# Patient Record
Sex: MALE | Race: WHITE | NOT HISPANIC OR LATINO | Employment: OTHER | URBAN - METROPOLITAN AREA
[De-identification: names, ages, dates, MRNs, and addresses within clinical notes are randomized per-mention and may not be internally consistent; named-entity substitution may affect disease eponyms.]

---

## 2019-12-12 ENCOUNTER — OFFICE VISIT (OUTPATIENT)
Dept: FAMILY MEDICINE CLINIC | Facility: CLINIC | Age: 69
End: 2019-12-12

## 2019-12-12 VITALS
TEMPERATURE: 98.3 F | SYSTOLIC BLOOD PRESSURE: 142 MMHG | OXYGEN SATURATION: 96 % | BODY MASS INDEX: 23.62 KG/M2 | RESPIRATION RATE: 20 BRPM | HEART RATE: 124 BPM | WEIGHT: 165 LBS | HEIGHT: 70 IN | DIASTOLIC BLOOD PRESSURE: 94 MMHG

## 2019-12-12 DIAGNOSIS — F10.20 ALCOHOLIC (HCC): ICD-10-CM

## 2019-12-12 DIAGNOSIS — G25.0 BENIGN ESSENTIAL TREMOR: ICD-10-CM

## 2019-12-12 DIAGNOSIS — Z12.5 SCREENING FOR PROSTATE CANCER: ICD-10-CM

## 2019-12-12 DIAGNOSIS — I10 ESSENTIAL HYPERTENSION: Primary | ICD-10-CM

## 2019-12-12 LAB
ALBUMIN SERPL-MCNC: 4.6 G/DL (ref 3.5–5.2)
ALBUMIN/GLOB SERPL: 1.2 G/DL
ALP SERPL-CCNC: 86 U/L (ref 39–117)
ALT SERPL W P-5'-P-CCNC: 12 U/L (ref 1–41)
ANION GAP SERPL CALCULATED.3IONS-SCNC: 15.3 MMOL/L (ref 5–15)
AST SERPL-CCNC: 17 U/L (ref 1–40)
BASOPHILS # BLD AUTO: 0.04 10*3/MM3 (ref 0–0.2)
BASOPHILS NFR BLD AUTO: 0.6 % (ref 0–1.5)
BILIRUB SERPL-MCNC: 0.7 MG/DL (ref 0.2–1.2)
BUN BLD-MCNC: 16 MG/DL (ref 8–23)
BUN/CREAT SERPL: 16.2 (ref 7–25)
CALCIUM SPEC-SCNC: 9.6 MG/DL (ref 8.6–10.5)
CHLORIDE SERPL-SCNC: 100 MMOL/L (ref 98–107)
CHOLEST SERPL-MCNC: 156 MG/DL (ref 0–200)
CO2 SERPL-SCNC: 24.7 MMOL/L (ref 22–29)
CREAT BLD-MCNC: 0.99 MG/DL (ref 0.76–1.27)
DEPRECATED RDW RBC AUTO: 43.8 FL (ref 37–54)
EOSINOPHIL # BLD AUTO: 0.01 10*3/MM3 (ref 0–0.4)
EOSINOPHIL NFR BLD AUTO: 0.1 % (ref 0.3–6.2)
ERYTHROCYTE [DISTWIDTH] IN BLOOD BY AUTOMATED COUNT: 13.5 % (ref 12.3–15.4)
FOLATE SERPL-MCNC: 11.2 NG/ML (ref 4.78–24.2)
GFR SERPL CREATININE-BSD FRML MDRD: 75 ML/MIN/1.73
GLOBULIN UR ELPH-MCNC: 3.7 GM/DL
GLUCOSE BLD-MCNC: 103 MG/DL (ref 65–99)
HCT VFR BLD AUTO: 48.3 % (ref 37.5–51)
HDLC SERPL-MCNC: 54 MG/DL (ref 40–60)
HGB BLD-MCNC: 16.2 G/DL (ref 13–17.7)
IMM GRANULOCYTES # BLD AUTO: 0.03 10*3/MM3 (ref 0–0.05)
IMM GRANULOCYTES NFR BLD AUTO: 0.4 % (ref 0–0.5)
LDLC SERPL CALC-MCNC: 87 MG/DL (ref 0–100)
LDLC/HDLC SERPL: 1.61 {RATIO}
LYMPHOCYTES # BLD AUTO: 2.16 10*3/MM3 (ref 0.7–3.1)
LYMPHOCYTES NFR BLD AUTO: 31.6 % (ref 19.6–45.3)
MCH RBC QN AUTO: 29.8 PG (ref 26.6–33)
MCHC RBC AUTO-ENTMCNC: 33.5 G/DL (ref 31.5–35.7)
MCV RBC AUTO: 88.8 FL (ref 79–97)
MONOCYTES # BLD AUTO: 0.67 10*3/MM3 (ref 0.1–0.9)
MONOCYTES NFR BLD AUTO: 9.8 % (ref 5–12)
NEUTROPHILS # BLD AUTO: 3.92 10*3/MM3 (ref 1.7–7)
NEUTROPHILS NFR BLD AUTO: 57.5 % (ref 42.7–76)
NRBC BLD AUTO-RTO: 0 /100 WBC (ref 0–0.2)
PLATELET # BLD AUTO: 196 10*3/MM3 (ref 140–450)
PMV BLD AUTO: 10.7 FL (ref 6–12)
POTASSIUM BLD-SCNC: 4.7 MMOL/L (ref 3.5–5.2)
PROT SERPL-MCNC: 8.3 G/DL (ref 6–8.5)
PSA SERPL-MCNC: 0.5 NG/ML (ref 0–4)
RBC # BLD AUTO: 5.44 10*6/MM3 (ref 4.14–5.8)
SODIUM BLD-SCNC: 140 MMOL/L (ref 136–145)
T4 FREE SERPL-MCNC: 1.12 NG/DL (ref 0.93–1.7)
TRIGL SERPL-MCNC: 74 MG/DL (ref 0–150)
TSH SERPL DL<=0.05 MIU/L-ACNC: 4.43 UIU/ML (ref 0.27–4.2)
VIT B12 BLD-MCNC: 250 PG/ML (ref 211–946)
VLDLC SERPL-MCNC: 14.8 MG/DL (ref 5–40)
WBC NRBC COR # BLD: 6.83 10*3/MM3 (ref 3.4–10.8)

## 2019-12-12 PROCEDURE — 99214 OFFICE O/P EST MOD 30 MIN: CPT | Performed by: FAMILY MEDICINE

## 2019-12-12 PROCEDURE — 84443 ASSAY THYROID STIM HORMONE: CPT | Performed by: FAMILY MEDICINE

## 2019-12-12 PROCEDURE — 80061 LIPID PANEL: CPT | Performed by: FAMILY MEDICINE

## 2019-12-12 PROCEDURE — 82607 VITAMIN B-12: CPT | Performed by: FAMILY MEDICINE

## 2019-12-12 PROCEDURE — 80053 COMPREHEN METABOLIC PANEL: CPT | Performed by: FAMILY MEDICINE

## 2019-12-12 PROCEDURE — 85025 COMPLETE CBC W/AUTO DIFF WBC: CPT | Performed by: FAMILY MEDICINE

## 2019-12-12 PROCEDURE — 84439 ASSAY OF FREE THYROXINE: CPT | Performed by: FAMILY MEDICINE

## 2019-12-12 PROCEDURE — 90472 IMMUNIZATION ADMIN EACH ADD: CPT | Performed by: FAMILY MEDICINE

## 2019-12-12 PROCEDURE — 90471 IMMUNIZATION ADMIN: CPT | Performed by: FAMILY MEDICINE

## 2019-12-12 PROCEDURE — G0103 PSA SCREENING: HCPCS | Performed by: FAMILY MEDICINE

## 2019-12-12 PROCEDURE — 82746 ASSAY OF FOLIC ACID SERUM: CPT | Performed by: FAMILY MEDICINE

## 2019-12-12 PROCEDURE — G0008 ADMIN INFLUENZA VIRUS VAC: HCPCS | Performed by: FAMILY MEDICINE

## 2019-12-12 PROCEDURE — 90715 TDAP VACCINE 7 YRS/> IM: CPT | Performed by: FAMILY MEDICINE

## 2019-12-12 PROCEDURE — 90670 PCV13 VACCINE IM: CPT | Performed by: FAMILY MEDICINE

## 2019-12-12 PROCEDURE — 90653 IIV ADJUVANT VACCINE IM: CPT | Performed by: FAMILY MEDICINE

## 2019-12-12 RX ORDER — NADOLOL 40 MG/1
40 TABLET ORAL DAILY
Qty: 30 TABLET | Refills: 3 | Status: SHIPPED | OUTPATIENT
Start: 2019-12-12 | End: 2020-01-28

## 2019-12-12 NOTE — PROGRESS NOTES
Olu Henriquez is a 69 y.o. male who presents today to establish care.    Chief Complaint   Patient presents with   • Establish Care     needs pcp        69-year-old male here to establish care.  Patient has past medical history of chronic alcoholism, benign essential tremor, and hypertension.  He is also been treated for anxiety and depression in the past but is not been taking Effexor for some time now has been feeling well.  Patient has cut drinking back to 2 beers a day.  He is no longer smoking cigarettes as of approximately 3 months ago.  Patient has no acute complaints today although he has been out of nadolol for several months and has noticed tremor becoming worse without it.  He has been taking nadolol for years and feels it works well for him.  Is also controlled his blood pressure in the past.  He has no acute complaints today.  Patient denies chest pain, shortness of breath, nausea, vomiting, diarrhea, constipation, urinary symptoms, and edema.       Review of Systems   Constitutional: Negative for fever and unexpected weight loss.   HENT: Negative for congestion, ear pain and sore throat.    Eyes: Negative for visual disturbance.   Respiratory: Negative for cough, shortness of breath and wheezing.    Cardiovascular: Negative for chest pain and palpitations.   Gastrointestinal: Negative for abdominal pain, blood in stool, constipation, diarrhea, nausea, vomiting and GERD.   Endocrine: Negative for polydipsia and polyuria.   Genitourinary: Negative for difficulty urinating.   Musculoskeletal: Negative for joint swelling.   Skin: Negative for rash and skin lesions.   Allergic/Immunologic: Negative for environmental allergies.   Neurological: Negative for seizures and syncope.   Hematological: Does not bruise/bleed easily.   Psychiatric/Behavioral: Negative for suicidal ideas.        PHQ-9 Depression Screening  Little interest or pleasure in doing things? 0   Feeling down, depressed, or hopeless? 0    Trouble falling or staying asleep, or sleeping too much?     Feeling tired or having little energy?     Poor appetite or overeating?     Feeling bad about yourself - or that you are a failure or have let yourself or your family down?     Trouble concentrating on things, such as reading the newspaper or watching television?     Moving or speaking so slowly that other people could have noticed? Or the opposite - being so fidgety or restless that you have been moving around a lot more than usual?     Thoughts that you would be better off dead, or of hurting yourself in some way?     PHQ-9 Total Score 0   If you checked off any problems, how difficult have these problems made it for you to do your work, take care of things at home, or get along with other people?         Past Medical History:   Diagnosis Date   • Alcoholic (CMS/HCC)    • Anxiety    • Concussion    • Depression    • Hypertension    • Pneumonia    • Shingles         Past Surgical History:   Procedure Laterality Date   • CHEST SURGERY      Chest clean out aftrer PNA with multiple chest tubes   • TONSILLECTOMY          Family History   Problem Relation Age of Onset   • No Known Problems Sister    • Hyperthyroidism Brother    • No Known Problems Daughter    • Other Mother 87        unknown   • Colon cancer Father    • No Known Problems Sister         Social History     Socioeconomic History   • Marital status:      Spouse name: Not on file   • Number of children: Not on file   • Years of education: Not on file   • Highest education level: Not on file   Tobacco Use   • Smoking status: Former Smoker     Packs/day: 1.00     Years: 58.00     Pack years: 58.00     Types: Cigarettes     Last attempt to quit: 2019     Years since quittin.2   • Smokeless tobacco: Never Used   Substance and Sexual Activity   • Alcohol use: Yes     Alcohol/week: 2.0 standard drinks     Types: 2 Cans of beer per week     Frequency: 4 or more times a  "week     Drinks per session: 1 or 2     Comment: daily, was a heavy drinker for years   • Drug use: Never   • Sexual activity: Not Currently        Current Outpatient Medications on File Prior to Visit   Medication Sig Dispense Refill   • [DISCONTINUED] nadolol (CORGARD) 40 MG tablet TAKE ONE TABLET BY MOUTH ONCE DAILY 30 tablet 0   • [DISCONTINUED] venlafaxine XR (EFFEXOR-XR) 75 MG 24 hr capsule TAKE ONE CAPSULE BY MOUTH ONCE DAILY 30 capsule 0     No current facility-administered medications on file prior to visit.        No Known Allergies     Visit Vitals  /94   Pulse (!) 124   Temp 98.3 °F (36.8 °C)   Resp 20   Ht 177.8 cm (70\")   Wt 74.8 kg (165 lb)   SpO2 96%   BMI 23.68 kg/m²      Body mass index is 23.68 kg/m².    Physical Exam   Constitutional: He is oriented to person, place, and time. He appears well-developed and well-nourished. No distress.   HENT:   Head: Atraumatic.   Eyes: EOM are normal.   Neck: Normal range of motion. Neck supple.   Cardiovascular: Normal rate, regular rhythm, normal heart sounds and intact distal pulses. Exam reveals no gallop and no friction rub.   No murmur heard.  Heart rate down to 96 on recheck by manual palpation.   Pulmonary/Chest: Effort normal and breath sounds normal. No stridor. No respiratory distress. He has no wheezes. He has no rales.   Musculoskeletal: He exhibits no edema.   Neurological: He is alert and oriented to person, place, and time. No cranial nerve deficit. He exhibits abnormal muscle tone (Patient has tremor at rest and with intention.  No cogwheel rigidity.  Tremor affects upper and lower extremities as well as head.).   Skin: Skin is warm and dry. He is not diaphoretic.   Psychiatric: He has a normal mood and affect. His behavior is normal.        No results found for this or any previous visit.     Problems Addressed this Visit        Cardiovascular and Mediastinum    Hypertension - Primary     Hypertension is worsening.  Patient has been off " medication for several months. Continue current treatment regimen.  Will restart home medications. Blood pressure will be reassessed in 3 months.         Relevant Medications    nadolol (CORGARD) 40 MG tablet    Other Relevant Orders    CBC & Differential    Comprehensive Metabolic Panel    Lipid Panel    TSH Rfx On Abnormal To Free T4       Nervous and Auditory    Benign essential tremor     Patient states the tremor has been stable for the time that he has been on nadolol.  His worsens is running on nadolol.  Will restart today.         Relevant Orders    TSH Rfx On Abnormal To Free T4    Vitamin B12    Folate       Other    Alcoholic (CMS/HCC)     Psychological condition is improving with lifestyle modifications.  Will check B12 and folate levels today. Psychological condition  will be reassessed in 3 months.         Relevant Orders    Vitamin B12    Folate      Other Visit Diagnoses     Screening for prostate cancer        Relevant Orders    PSA Screen          Return in about 3 months (around 3/12/2020) for Annual.    Parts of this office note have been dictated by voice recognition software. Grammatical and/or spelling errors may be present.     Jesse De Anda MD  12/12/2019

## 2019-12-12 NOTE — ASSESSMENT & PLAN NOTE
Hypertension is worsening.  Patient has been off medication for several months. Continue current treatment regimen.  Will restart home medications. Blood pressure will be reassessed in 3 months.

## 2019-12-12 NOTE — ASSESSMENT & PLAN NOTE
Psychological condition is improving with lifestyle modifications.  Will check B12 and folate levels today. Psychological condition  will be reassessed in 3 months.

## 2019-12-12 NOTE — ASSESSMENT & PLAN NOTE
Patient states the tremor has been stable for the time that he has been on nadolol.  His worsens is running on nadolol.  Will restart today.

## 2020-01-28 DIAGNOSIS — I10 ESSENTIAL HYPERTENSION: ICD-10-CM

## 2020-01-28 RX ORDER — NADOLOL 40 MG/1
TABLET ORAL
Qty: 30 TABLET | Refills: 3 | Status: SHIPPED | OUTPATIENT
Start: 2020-01-28 | End: 2020-04-28

## 2020-04-28 DIAGNOSIS — I10 ESSENTIAL HYPERTENSION: ICD-10-CM

## 2020-04-28 RX ORDER — NADOLOL 40 MG/1
TABLET ORAL
Qty: 90 TABLET | Refills: 0 | Status: SHIPPED | OUTPATIENT
Start: 2020-04-28 | End: 2020-04-28 | Stop reason: SDUPTHER

## 2020-04-28 RX ORDER — NADOLOL 40 MG/1
40 TABLET ORAL DAILY
Qty: 90 TABLET | Refills: 0 | Status: SHIPPED | OUTPATIENT
Start: 2020-04-28 | End: 2020-06-19

## 2020-06-19 DIAGNOSIS — I10 ESSENTIAL HYPERTENSION: ICD-10-CM

## 2020-06-19 RX ORDER — NADOLOL 40 MG/1
TABLET ORAL
Qty: 30 TABLET | Refills: 0 | Status: SHIPPED | OUTPATIENT
Start: 2020-06-19 | End: 2020-07-27 | Stop reason: SDUPTHER

## 2020-07-25 DIAGNOSIS — I10 ESSENTIAL HYPERTENSION: ICD-10-CM

## 2020-07-25 RX ORDER — NADOLOL 40 MG/1
TABLET ORAL
Qty: 30 TABLET | Refills: 0 | OUTPATIENT
Start: 2020-07-25

## 2020-07-27 DIAGNOSIS — I10 ESSENTIAL HYPERTENSION: ICD-10-CM

## 2020-07-27 RX ORDER — NADOLOL 40 MG/1
40 TABLET ORAL DAILY
Qty: 30 TABLET | Refills: 0 | Status: SHIPPED | OUTPATIENT
Start: 2020-07-27 | End: 2020-08-25

## 2020-08-25 DIAGNOSIS — I10 ESSENTIAL HYPERTENSION: ICD-10-CM

## 2020-08-25 RX ORDER — NADOLOL 40 MG/1
TABLET ORAL
Qty: 30 TABLET | Refills: 0 | Status: SHIPPED | OUTPATIENT
Start: 2020-08-25 | End: 2020-09-15 | Stop reason: SDUPTHER

## 2020-09-15 DIAGNOSIS — I10 ESSENTIAL HYPERTENSION: ICD-10-CM

## 2020-09-15 RX ORDER — NADOLOL 40 MG/1
40 TABLET ORAL DAILY
Qty: 30 TABLET | Refills: 0 | Status: SHIPPED | OUTPATIENT
Start: 2020-09-15 | End: 2020-09-28 | Stop reason: SDUPTHER

## 2020-09-28 DIAGNOSIS — I10 ESSENTIAL HYPERTENSION: ICD-10-CM

## 2020-09-28 RX ORDER — NADOLOL 40 MG/1
40 TABLET ORAL DAILY
Qty: 30 TABLET | Refills: 0 | Status: SHIPPED | OUTPATIENT
Start: 2020-09-28 | End: 2020-12-17 | Stop reason: CLARIF

## 2020-10-08 ENCOUNTER — TELEPHONE (OUTPATIENT)
Dept: FAMILY MEDICINE CLINIC | Facility: CLINIC | Age: 70
End: 2020-10-08

## 2020-10-08 NOTE — TELEPHONE ENCOUNTER
Jamison Henriquez (  verbal on file) is calling about a medication change due to insurance asked to switch from nadolol (CORGARD) 40 MG tablet  to propanolol.  Pharmacy needs authorization to change the medication.  Please advise      Jamison Henriquez call back [phone]      Pharmacy [facility], [address], [place], [place] [phone]

## 2020-11-04 RX ORDER — PROPRANOLOL HYDROCHLORIDE 40 MG/1
TABLET ORAL
Qty: 30 TABLET | OUTPATIENT
Start: 2020-11-04

## 2020-12-17 ENCOUNTER — OFFICE VISIT (OUTPATIENT)
Dept: FAMILY MEDICINE CLINIC | Facility: CLINIC | Age: 70
End: 2020-12-17

## 2020-12-17 VITALS
TEMPERATURE: 97.5 F | OXYGEN SATURATION: 98 % | RESPIRATION RATE: 18 BRPM | WEIGHT: 192.6 LBS | SYSTOLIC BLOOD PRESSURE: 144 MMHG | HEART RATE: 116 BPM | DIASTOLIC BLOOD PRESSURE: 94 MMHG | HEIGHT: 70 IN | BODY MASS INDEX: 27.57 KG/M2

## 2020-12-17 DIAGNOSIS — Z00.00 MEDICARE ANNUAL WELLNESS VISIT, SUBSEQUENT: Primary | ICD-10-CM

## 2020-12-17 DIAGNOSIS — I10 ESSENTIAL HYPERTENSION: ICD-10-CM

## 2020-12-17 DIAGNOSIS — E55.9 VITAMIN D DEFICIENCY: ICD-10-CM

## 2020-12-17 DIAGNOSIS — G25.0 BENIGN ESSENTIAL TREMOR: ICD-10-CM

## 2020-12-17 LAB
ALBUMIN SERPL-MCNC: 4.5 G/DL (ref 3.5–5.2)
ALBUMIN/GLOB SERPL: 1.4 G/DL
ALP SERPL-CCNC: 60 U/L (ref 39–117)
ALT SERPL W P-5'-P-CCNC: 11 U/L (ref 1–41)
ANION GAP SERPL CALCULATED.3IONS-SCNC: 12.3 MMOL/L (ref 5–15)
AST SERPL-CCNC: 19 U/L (ref 1–40)
BASOPHILS # BLD AUTO: 0.04 10*3/MM3 (ref 0–0.2)
BASOPHILS NFR BLD AUTO: 0.7 % (ref 0–1.5)
BILIRUB SERPL-MCNC: 0.6 MG/DL (ref 0–1.2)
BUN SERPL-MCNC: 21 MG/DL (ref 8–23)
BUN/CREAT SERPL: 21.4 (ref 7–25)
CALCIUM SPEC-SCNC: 9.4 MG/DL (ref 8.6–10.5)
CHLORIDE SERPL-SCNC: 102 MMOL/L (ref 98–107)
CO2 SERPL-SCNC: 26.7 MMOL/L (ref 22–29)
CREAT SERPL-MCNC: 0.98 MG/DL (ref 0.76–1.27)
DEPRECATED RDW RBC AUTO: 42.6 FL (ref 37–54)
EOSINOPHIL # BLD AUTO: 0 10*3/MM3 (ref 0–0.4)
EOSINOPHIL NFR BLD AUTO: 0 % (ref 0.3–6.2)
ERYTHROCYTE [DISTWIDTH] IN BLOOD BY AUTOMATED COUNT: 13.1 % (ref 12.3–15.4)
GFR SERPL CREATININE-BSD FRML MDRD: 75 ML/MIN/1.73
GLOBULIN UR ELPH-MCNC: 3.3 GM/DL
GLUCOSE SERPL-MCNC: 93 MG/DL (ref 65–99)
HCT VFR BLD AUTO: 48.3 % (ref 37.5–51)
HGB BLD-MCNC: 16.5 G/DL (ref 13–17.7)
IMM GRANULOCYTES # BLD AUTO: 0.04 10*3/MM3 (ref 0–0.05)
IMM GRANULOCYTES NFR BLD AUTO: 0.7 % (ref 0–0.5)
LYMPHOCYTES # BLD AUTO: 2.14 10*3/MM3 (ref 0.7–3.1)
LYMPHOCYTES NFR BLD AUTO: 36.6 % (ref 19.6–45.3)
MCH RBC QN AUTO: 30.5 PG (ref 26.6–33)
MCHC RBC AUTO-ENTMCNC: 34.2 G/DL (ref 31.5–35.7)
MCV RBC AUTO: 89.3 FL (ref 79–97)
MONOCYTES # BLD AUTO: 0.45 10*3/MM3 (ref 0.1–0.9)
MONOCYTES NFR BLD AUTO: 7.7 % (ref 5–12)
NEUTROPHILS NFR BLD AUTO: 3.18 10*3/MM3 (ref 1.7–7)
NEUTROPHILS NFR BLD AUTO: 54.3 % (ref 42.7–76)
NRBC BLD AUTO-RTO: 0 /100 WBC (ref 0–0.2)
PLATELET # BLD AUTO: 167 10*3/MM3 (ref 140–450)
PMV BLD AUTO: 11.5 FL (ref 6–12)
POTASSIUM SERPL-SCNC: 4.7 MMOL/L (ref 3.5–5.2)
PROT SERPL-MCNC: 7.8 G/DL (ref 6–8.5)
RBC # BLD AUTO: 5.41 10*6/MM3 (ref 4.14–5.8)
SODIUM SERPL-SCNC: 141 MMOL/L (ref 136–145)
WBC # BLD AUTO: 5.85 10*3/MM3 (ref 3.4–10.8)

## 2020-12-17 PROCEDURE — 80053 COMPREHEN METABOLIC PANEL: CPT | Performed by: FAMILY MEDICINE

## 2020-12-17 PROCEDURE — 82306 VITAMIN D 25 HYDROXY: CPT | Performed by: FAMILY MEDICINE

## 2020-12-17 PROCEDURE — G0439 PPPS, SUBSEQ VISIT: HCPCS | Performed by: FAMILY MEDICINE

## 2020-12-17 PROCEDURE — 85025 COMPLETE CBC W/AUTO DIFF WBC: CPT | Performed by: FAMILY MEDICINE

## 2020-12-17 RX ORDER — PROPRANOLOL HYDROCHLORIDE 40 MG/1
40 TABLET ORAL DAILY
COMMUNITY
Start: 2020-10-09 | End: 2020-12-17 | Stop reason: SDUPTHER

## 2020-12-17 RX ORDER — IBUPROFEN 800 MG/1
800 TABLET ORAL EVERY 6 HOURS PRN
COMMUNITY
End: 2023-01-12 | Stop reason: HOSPADM

## 2020-12-17 RX ORDER — PROPRANOLOL HYDROCHLORIDE 40 MG/1
40 TABLET ORAL DAILY
Qty: 90 TABLET | Refills: 3 | Status: SHIPPED | OUTPATIENT
Start: 2020-12-17 | End: 2021-12-06

## 2020-12-17 RX ORDER — ASPIRIN 81 MG/1
81 TABLET ORAL DAILY
COMMUNITY

## 2020-12-17 NOTE — PROGRESS NOTES
The ABCs of the Annual Wellness Visit  Subsequent Medicare Wellness Visit    Chief Complaint   Patient presents with   • Annual Exam       Subjective   History of Present Illness:  Olu Henriquez is a 70 y.o. male who presents for a Subsequent Medicare Wellness Visit.    HEALTH RISK ASSESSMENT    Recent Hospitalizations:  No hospitalization(s) within the last year.    Current Medical Providers:  Patient Care Team:  Jesse De Anda MD as PCP - General (Family Medicine)    Smoking Status:  Social History     Tobacco Use   Smoking Status Former Smoker   • Packs/day: 1.00   • Years: 58.00   • Pack years: 58.00   • Types: Cigarettes   • Quit date: 2019   • Years since quittin.2   Smokeless Tobacco Never Used       Alcohol Consumption:  Social History     Substance and Sexual Activity   Alcohol Use Yes   • Alcohol/week: 2.0 standard drinks   • Types: 2 Cans of beer per week   • Frequency: 4 or more times a week   • Drinks per session: 1 or 2    Comment: Very little anymore       Depression Screen:   PHQ-2/PHQ-9 Depression Screening 2020   Little interest or pleasure in doing things 0   Feeling down, depressed, or hopeless 0   Total Score 0       Fall Risk Screen:  STEADI Fall Risk Assessment was completed, and patient is at MODERATE risk for falls. Assessment completed on:2020    Health Habits and Functional and Cognitive Screening:  Functional & Cognitive Status 2020   Do you have difficulty preparing food and eating? Yes   Do you have difficulty bathing yourself, getting dressed or grooming yourself? Yes   Do you have difficulty using the toilet? No   Do you have difficulty moving around from place to place? Yes   Do you have trouble with steps or getting out of a bed or a chair? Yes   Current Diet Well Balanced Diet   Dental Exam Not up to date   Eye Exam Up to date   Exercise (times per week) 0 times per week   Current Exercises Include No Regular Exercise   Do you need help using the  phone?  No   Are you deaf or do you have serious difficulty hearing?  No   Do you need help with transportation? Yes   Do you need help shopping? Yes   Do you need help preparing meals?  No   Do you need help with housework?  Yes   Do you need help with laundry? Yes   Do you need help taking your medications? No   Do you need help managing money? No   Do you ever drive or ride in a car without wearing a seat belt? No   Have you felt unusual stress, anger or loneliness in the last month? No   Who do you live with? Alone   If you need help, do you have trouble finding someone available to you? Yes   Have you been bothered in the last four weeks by sexual problems? No   Do you have difficulty concentrating, remembering or making decisions? No         Does the patient have evidence of cognitive impairment? No    Asprin use counseling:Taking ASA appropriately as indicated    Age-appropriate Screening Schedule:  Refer to the list below for future screening recommendations based on patient's age, sex and/or medical conditions. Orders for these recommended tests are listed in the plan section. The patient has been provided with a written plan.    Health Maintenance   Topic Date Due   • ZOSTER VACCINE (1 of 2) 12/17/2020 (Originally 10/26/2000)   • COLONOSCOPY  12/01/2021 (Originally 1950)   • INFLUENZA VACCINE  12/17/2021 (Originally 8/1/2020)   • TDAP/TD VACCINES (2 - Td) 12/12/2029          The following portions of the patient's history were reviewed and updated as appropriate: allergies, current medications, past family history, past medical history, past social history, past surgical history and problem list.    Outpatient Medications Prior to Visit   Medication Sig Dispense Refill   • aspirin 81 MG EC tablet Take 81 mg by mouth Daily.     • ibuprofen (ADVIL,MOTRIN) 800 MG tablet Take 800 mg by mouth Every 6 (Six) Hours As Needed for Mild Pain .     • vitamin D3 125 MCG (5000 UT) capsule capsule Take 5,000 Units  by mouth Daily.     • nadolol (CORGARD) 40 MG tablet Take 1 tablet by mouth Daily. 30 tablet 0   • propranolol (INDERAL) 40 MG tablet Take 40 mg by mouth Daily.       No facility-administered medications prior to visit.        Patient Active Problem List   Diagnosis   • Benign essential tremor   • Hypertension   • Alcoholic (CMS/HCC)   • Medicare annual wellness visit, subsequent   • Vitamin D deficiency       Advanced Care Planning:  ACP discussion was held with the patient during this visit. Patient does not have an advance directive, information provided.    Review of Systems   Constitutional: Negative for activity change, appetite change, fever and unexpected weight change.   HENT: Negative for congestion, ear pain and sore throat.    Eyes: Negative for visual disturbance.   Respiratory: Negative for cough, shortness of breath and wheezing.    Cardiovascular: Negative for chest pain, palpitations and leg swelling.   Gastrointestinal: Negative for abdominal pain, blood in stool, constipation, diarrhea, nausea and vomiting.   Endocrine: Negative for polydipsia and polyuria.   Genitourinary: Negative for dysuria and hematuria.   Musculoskeletal: Negative for arthralgias, back pain and myalgias.   Skin: Negative for rash.   Allergic/Immunologic: Negative for immunocompromised state.   Neurological: Positive for tremors (chronic and stable). Negative for dizziness, seizures, syncope, weakness and headaches.   Hematological: Does not bruise/bleed easily.   Psychiatric/Behavioral: Negative for hallucinations, self-injury and suicidal ideas. The patient is not nervous/anxious.        Compared to one year ago, the patient feels his physical health is the same.  Compared to one year ago, the patient feels his mental health is the same.    Reviewed chart for potential of high risk medication in the elderly: yes  Reviewed chart for potential of harmful drug interactions in the elderly:yes    Objective         Vitals:     "12/17/20 1157   BP: 144/94   Pulse: 116   Resp: 18   Temp: 97.5 °F (36.4 °C)   TempSrc: Temporal   SpO2: 98%   Weight: 87.4 kg (192 lb 9.6 oz)   Height: 177.8 cm (70\")   PainSc:   4   PainLoc: Back       Body mass index is 27.64 kg/m².  Discussed the patient's BMI with him. The BMI is in the acceptable range.    Physical Exam  Constitutional:       General: He is not in acute distress.     Appearance: He is well-developed. He is not diaphoretic.   HENT:      Head: Atraumatic.   Neck:      Musculoskeletal: Normal range of motion and neck supple.   Cardiovascular:      Rate and Rhythm: Regular rhythm. Tachycardia present.      Heart sounds: Normal heart sounds. No murmur. No friction rub. No gallop.    Pulmonary:      Effort: Pulmonary effort is normal. No respiratory distress.      Breath sounds: Normal breath sounds. No wheezing or rales.   Abdominal:      General: Bowel sounds are normal. There is no distension.      Palpations: Abdomen is soft.      Tenderness: There is no abdominal tenderness.   Skin:     General: Skin is warm and dry.   Neurological:      Mental Status: He is alert and oriented to person, place, and time.   Psychiatric:         Behavior: Behavior normal.               Assessment/Plan   Medicare Risks and Personalized Health Plan  CMS Preventative Services Quick Reference  Abdominal Aortic Aneurysm Screening  Advance Directive Discussion  Cardiovascular risk  Colon Cancer Screening  Dementia/Memory   Depression/Dysphoria  Diabetic Lab Screening   Fall Risk  Immunizations Discussed/Encouraged (specific immunizations; Influenza and Shingrix )  Lung Cancer Risk  Prostate Cancer Screening     The above risks/problems have been discussed with the patient.  Pertinent information has been shared with the patient in the After Visit Summary.  Follow up plans and orders are seen below in the Assessment/Plan Section.    Diagnoses and all orders for this visit:    1. Medicare annual wellness visit, " subsequent (Primary)  Assessment & Plan:  The patient is here for health maintenance visit.  Currently, the patient consumes a unhealthy diet and has an inadequate exercise regimen.  Screening lab work is ordered.  Immunizations were reviewed today.  Advice and education was given regarding nutrition, aerobic exercise, routine dental evaluations, routine eye exams, reproductive health, cardiovascular risk reduction, sunscreen use, self skin examination (annual dermatology evaluations) and seatbelt use (general overall safety).  Further recommendations will be given if needed after lab evaluation.  Annual wellness evaluation is recommended.      Orders:  -     CBC & Differential  -     Comprehensive Metabolic Panel  -     Vitamin D 25 Hydroxy    2. Vitamin D deficiency  -     Vitamin D 25 Hydroxy    3. Benign essential tremor  -     propranolol (INDERAL) 40 MG tablet; Take 1 tablet by mouth Daily.  Dispense: 90 tablet; Refill: 3    4. Essential hypertension  -     propranolol (INDERAL) 40 MG tablet; Take 1 tablet by mouth Daily.  Dispense: 90 tablet; Refill: 3    Follow Up:  Return in about 6 months (around 6/17/2021) for Follow-up HTN, essential tremor.     An After Visit Summary and PPPS were given to the patient.

## 2020-12-17 NOTE — PATIENT INSTRUCTIONS
Advance Care Planning and Advance Directives     You make decisions on a daily basis - decisions about where you want to live, your career, your home, your life. Perhaps one of the most important decisions you face is your choice for future medical care. Take time to talk with your family and your healthcare team and start planning today.  Advance Care Planning is a process that can help you:  · Understand possible future healthcare decisions in light of your own experiences  · Reflect on those decision in light of your goals and values  · Discuss your decisions with those closest to you and the healthcare professionals that care for you  · Make a plan by creating a document that reflects your wishes    Surrogate Decision Maker  In the event of a medical emergency, which has left you unable to communicate or to make your own decisions, you would need someone to make decisions for you.  It is important to discuss your preferences for medical treatment with this person while you are in good health.     Qualities of a surrogate decision maker:  • Willing to take on this role and responsibility  • Knows what you want for future medical care  • Willing to follow your wishes even if they don't agree with them  • Able to make difficult medical decisions under stressful circumstances    Advance Directives  These are legal documents you can create that will guide your healthcare team and decision maker(s) when needed. These documents can be stored in the electronic medical record.    · Living Will - a legal document to guide your care if you have a terminal condition or a serious illness and are unable to communicate. States vary by statute in document names/types, but most forms may include one or more of the following:        -  Directions regarding life-prolonging treatments        -  Directions regarding artificially provided nutrition/hydration        -  Choosing a healthcare decision maker        -  Direction  regarding organ/tissue donation    · Durable Power of  for Healthcare - this document names an -in-fact to make medical decisions for you, but it may also allow this person to make personal and financial decisions for you. Please seek the advice of an  if you need this type of document.    **Advance Directives are not required and no one may discriminate against you if you do not sign one.    Medical Orders  Many states allow specific forms/orders signed by your physician to record your wishes for medical treatment in your current state of health. This form, signed in personal communication with your physician, addresses resuscitation and other medical interventions that you may or may not want.      For more information or to schedule a time with a [facility] Advance Care Planning Facilitator contact: [facility] or call [phone] and someone will contact you directly.    Advance Directive    Advance directives are legal documents that let you make choices ahead of time about your health care and medical treatment in case you become unable to communicate for yourself. Advance directives are a way for you to make known your wishes to family, friends, and health care providers. This can let others know about your end-of-life care if you become unable to communicate.  Discussing and writing advance directives should happen over time rather than all at once. Advance directives can be changed depending on your situation and what you want, even after you have signed the advance directives.  There are different types of advance directives, such as:  · Medical power of .  · Living will.  · Do not resuscitate (DNR) or do not attempt resuscitation (DNAR) order.  Health care proxy and medical power of   A health care proxy is also called a health care agent. This is a person who is appointed to make medical decisions for you in cases where you are unable to make the  decisions yourself. Generally, people choose someone they know well and trust to represent their preferences. Make sure to ask this person for an agreement to act as your proxy. A proxy may have to exercise judgment in the event of a medical decision for which your wishes are not known.  A medical power of  is a legal document that names your health care proxy. Depending on the laws in your state, after the document is written, it may also need to be:  · Signed.  · Notarized.  · Dated.  · Copied.  · Witnessed.  · Incorporated into your medical record.  You may also want to appoint someone to manage your money in a situation in which you are unable to do so. This is called a durable power of  for finances. It is a separate legal document from the durable power of  for health care. You may choose the same person or someone different from your health care proxy to act as your agent in money matters.  If you do not appoint a proxy, or if there is a concern that the proxy is not acting in your best interests, a court may appoint a guardian to act on your behalf.  Living will  A living will is a set of instructions that state your wishes about medical care when you cannot express them yourself. Health care providers should keep a copy of your living will in your medical record. You may want to give a copy to family members or friends. To alert caregivers in case of an emergency, you can place a card in your wallet to let them know that you have a living will and where they can find it. A living will is used if you become:  · Terminally ill.  · Disabled.  · Unable to communicate or make decisions.  Items to consider in your living will include:  · To use or not to use life-support equipment, such as dialysis machines and breathing machines (ventilators).  · A DNR or DNAR order. This tells health care providers not to use cardiopulmonary resuscitation (CPR) if breathing or heartbeat stops.  · To use  or not to use tube feeding.  · To be given or not to be given food and fluids.  · Comfort (palliative) care when the goal becomes comfort rather than a cure.  · Donation of organs and tissues.  A living will does not give instructions for distributing your money and property if you should pass away.  DNR or DNAR  A DNR or DNAR order is a request not to have CPR in the event that your heart stops beating or you stop breathing. If a DNR or DNAR order has not been made and shared, a health care provider will try to help any patient whose heart has stopped or who has stopped breathing. If you plan to have surgery, talk with your health care provider about how your DNR or DNAR order will be followed if problems occur.  What if I do not have an advance directive?  If you do not have an advance directive, some states assign family decision makers to act on your behalf based on how closely you are related to them. Each state has its own laws about advance directives. You may want to check with your health care provider, , or state representative about the laws in your state.  Summary  · Advance directives are the legal documents that allow you to make choices ahead of time about your health care and medical treatment in case you become unable to tell others about your care.  · The process of discussing and writing advance directives should happen over time. You can change the advance directives, even after you have signed them.  · Advance directives include DNR or DNAR orders, living young, and designating an agent as your medical power of .  This information is not intended to replace advice given to you by your health care provider. Make sure you discuss any questions you have with your health care provider.  Document Revised: 07/16/2020 Document Reviewed: 07/16/2020  Elsevier Patient Education © 2020 [employer]      Medicare Wellness  Personal Prevention Plan of Service     Date of Office Visit:   2020  Encounter Provider:  Jesse De Anda MD  Place of Service:  [facility]  Patient Name: Olu Henriquez  :  1950    As part of the Medicare Wellness portion of your visit today, we are providing you with this personalized preventive plan of services (PPPS). This plan is based upon recommendations of the [place] Preventive Services Task Force (USPSTF) and the Advisory Committee on Immunization Practices (ACIP).    This lists the preventive care services that should be considered, and provides dates of when you are due. Items listed as completed are up-to-date and do not require any further intervention.    Health Maintenance   Topic Date Due   • ANNUAL WELLNESS VISIT  2019   • AAA SCREEN (ONE-TIME)  2020 (Originally 2019)   • ZOSTER VACCINE (1 of 2) 2020 (Originally 10/26/1999)   • HEPATITIS C SCREENING  2021 (Originally 2019)   • COLONOSCOPY  2021 (Originally 1950)   • INFLUENZA VACCINE  2021 (Originally 2020)   • LUNG CANCER SCREENING  2021 (Originally 10/26/2004)   • TDAP/TD VACCINES (2 - Td) 2029   • Pneumococcal Vaccine 65+  Completed       Orders Placed This Encounter   Procedures   • Comprehensive Metabolic Panel   • Vitamin D 25 Hydroxy   • CBC & Differential     Order Specific Question:   Manual Differential     Answer:   No       Return in about 6 months (around 2021) for Follow-up HTN, essential tremor.          Preventive Care 65 Years and Older, Male  Preventive care refers to lifestyle choices and visits with your health care provider that can promote health and wellness. This includes:  · A yearly physical exam. This is also called an annual well check.  · Regular dental and eye exams.  · Immunizations.  · Screening for certain conditions.  · Healthy lifestyle choices, such as diet and exercise.  What can I expect for my preventive care visit?  Physical exam  Your  health care provider will check:  · Height and weight. These may be used to calculate body mass index (BMI), which is a measurement that tells if you are at a healthy weight.  · Heart rate and blood pressure.  · Your skin for abnormal spots.  Counseling  Your health care provider may ask you questions about:  · Alcohol, tobacco, and drug use.  · Emotional well-being.  · Home and relationship well-being.  · Sexual activity.  · Eating habits.  · History of falls.  · Memory and ability to understand (cognition).  · Work and work environment.  What immunizations do I need?    Influenza (flu) vaccine  · This is recommended every year.  Tetanus, diphtheria, and pertussis (Tdap) vaccine  · You may need a Td booster every 10 years.  Varicella (chickenpox) vaccine  · You may need this vaccine if you have not already been vaccinated.  Zoster (shingles) vaccine  · You may need this after age 60.  Pneumococcal conjugate (PCV13) vaccine  · One dose is recommended after age 65.  Pneumococcal polysaccharide (PPSV23) vaccine  · One dose is recommended after age 65.  Measles, mumps, and rubella (MMR) vaccine  · You may need at least one dose of MMR if you were born in 1957 or later. You may also need a second dose.  Meningococcal conjugate (MenACWY) vaccine  · You may need this if you have certain conditions.  Hepatitis A vaccine  · You may need this if you have certain conditions or if you travel or work in places where you may be exposed to hepatitis A.  Hepatitis B vaccine  · You may need this if you have certain conditions or if you travel or work in places where you may be exposed to hepatitis B.  Haemophilus influenzae type b (Hib) vaccine  · You may need this if you have certain conditions.  You may receive vaccines as individual doses or as more than one vaccine together in one shot (combination vaccines). Talk with your health care provider about the risks and benefits of combination vaccines.  What tests do I need?  Blood  tests  · Lipid and cholesterol levels. These may be checked every 5 years, or more frequently depending on your overall health.  · Hepatitis C test.  · Hepatitis B test.  Screening  · Lung cancer screening. You may have this screening every year starting at age 55 if you have a 30-pack-year history of smoking and currently smoke or have quit within the past 15 years.  · Colorectal cancer screening. All adults should have this screening starting at age 50 and continuing until age 75. Your health care provider may recommend screening at age 45 if you are at increased risk. You will have tests every 1-10 years, depending on your results and the type of screening test.  · Prostate cancer screening. Recommendations will vary depending on your family history and other risks.  · Diabetes screening. This is done by checking your blood sugar (glucose) after you have not eaten for a while (fasting). You may have this done every 1-3 years.  · Abdominal aortic aneurysm (AAA) screening. You may need this if you are a current or former smoker.  · Sexually transmitted disease (STD) testing.  Follow these instructions at home:  Eating and drinking  · Eat a diet that includes fresh fruits and vegetables, whole grains, lean protein, and low-fat dairy products. Limit your intake of foods with high amounts of sugar, saturated fats, and salt.  · Take vitamin and mineral supplements as recommended by your health care provider.  · Do not drink alcohol if your health care provider tells you not to drink.  · If you drink alcohol:  ? Limit how much you have to 0-2 drinks a day.  ? Be aware of how much alcohol is in your drink. In the [place] one drink equals one 12 oz bottle of beer (355 mL), one 5 oz glass of wine (148 mL), or one 1½ oz glass of hard liquor (44 mL).  Lifestyle  · Take daily care of your teeth and gums.  · Stay active. Exercise for at least 30 minutes on 5 or more days each week.  · Do not use any products that contain  nicotine or tobacco, such as cigarettes, e-cigarettes, and chewing tobacco. If you need help quitting, ask your health care provider.  · If you are sexually active, practice safe sex. Use a condom or other form of protection to prevent STIs (sexually transmitted infections).  · Talk with your health care provider about taking a low-dose aspirin or statin.  What's next?  · Visit your health care provider once a year for a well check visit.  · Ask your health care provider how often you should have your eyes and teeth checked.  · Stay up to date on all vaccines.  This information is not intended to replace advice given to you by your health care provider. Make sure you discuss any questions you have with your health care provider.  Document Revised: 12/12/2019 Document Reviewed: 12/12/2019  Elsevier Patient Education © 2020 [employer]

## 2020-12-18 LAB — 25(OH)D3 SERPL-MCNC: 65.4 NG/ML (ref 30–100)

## 2021-12-03 DIAGNOSIS — I10 ESSENTIAL HYPERTENSION: ICD-10-CM

## 2021-12-03 DIAGNOSIS — G25.0 BENIGN ESSENTIAL TREMOR: ICD-10-CM

## 2021-12-06 RX ORDER — PROPRANOLOL HYDROCHLORIDE 40 MG/1
TABLET ORAL
Qty: 90 TABLET | Refills: 3 | Status: SHIPPED | OUTPATIENT
Start: 2021-12-06 | End: 2023-01-12 | Stop reason: HOSPADM

## 2021-12-20 ENCOUNTER — TELEPHONE (OUTPATIENT)
Dept: FAMILY MEDICINE CLINIC | Facility: CLINIC | Age: 71
End: 2021-12-20

## 2023-01-04 ENCOUNTER — APPOINTMENT (OUTPATIENT)
Dept: CT IMAGING | Facility: HOSPITAL | Age: 73
DRG: 871 | End: 2023-01-04
Payer: MEDICARE

## 2023-01-04 ENCOUNTER — HOSPITAL ENCOUNTER (INPATIENT)
Facility: HOSPITAL | Age: 73
LOS: 8 days | Discharge: NURSING, REHAB OR LONG-TERM CARE | DRG: 871 | End: 2023-01-12
Attending: EMERGENCY MEDICINE | Admitting: FAMILY MEDICINE
Payer: MEDICARE

## 2023-01-04 ENCOUNTER — APPOINTMENT (OUTPATIENT)
Dept: GENERAL RADIOLOGY | Facility: HOSPITAL | Age: 73
DRG: 871 | End: 2023-01-04
Payer: MEDICARE

## 2023-01-04 DIAGNOSIS — E83.42 HYPOMAGNESEMIA: ICD-10-CM

## 2023-01-04 DIAGNOSIS — J18.9 PNEUMONIA DUE TO INFECTIOUS ORGANISM, UNSPECIFIED LATERALITY, UNSPECIFIED PART OF LUNG: ICD-10-CM

## 2023-01-04 DIAGNOSIS — A41.9 SEPSIS WITHOUT ACUTE ORGAN DYSFUNCTION, DUE TO UNSPECIFIED ORGANISM: ICD-10-CM

## 2023-01-04 DIAGNOSIS — R79.89 ELEVATED LFTS: ICD-10-CM

## 2023-01-04 DIAGNOSIS — J96.01 ACUTE RESPIRATORY FAILURE WITH HYPOXIA: Primary | ICD-10-CM

## 2023-01-04 DIAGNOSIS — E87.6 HYPOKALEMIA: ICD-10-CM

## 2023-01-04 PROBLEM — R53.81 PHYSICAL DECONDITIONING: Status: ACTIVE | Noted: 2023-01-04

## 2023-01-04 PROBLEM — E86.0 MODERATE DEHYDRATION: Status: ACTIVE | Noted: 2023-01-04

## 2023-01-04 LAB
ALBUMIN SERPL-MCNC: 3.4 G/DL (ref 3.5–5.2)
ALBUMIN/GLOB SERPL: 0.9 G/DL
ALP SERPL-CCNC: 73 U/L (ref 39–117)
ALT SERPL W P-5'-P-CCNC: 17 U/L (ref 1–41)
ANION GAP SERPL CALCULATED.3IONS-SCNC: 17 MMOL/L (ref 5–15)
AST SERPL-CCNC: 40 U/L (ref 1–40)
BACTERIA UR QL AUTO: ABNORMAL /HPF
BASOPHILS # BLD AUTO: 0.03 10*3/MM3 (ref 0–0.2)
BASOPHILS NFR BLD AUTO: 0.2 % (ref 0–1.5)
BILIRUB SERPL-MCNC: 1.3 MG/DL (ref 0–1.2)
BILIRUB UR QL STRIP: NEGATIVE
BUN SERPL-MCNC: 35 MG/DL (ref 8–23)
BUN/CREAT SERPL: 29.7 (ref 7–25)
CALCIUM SPEC-SCNC: 8.8 MG/DL (ref 8.6–10.5)
CHLORIDE SERPL-SCNC: 97 MMOL/L (ref 98–107)
CLARITY UR: CLEAR
CO2 SERPL-SCNC: 22 MMOL/L (ref 22–29)
COLOR UR: ABNORMAL
CREAT SERPL-MCNC: 1.18 MG/DL (ref 0.76–1.27)
D-LACTATE SERPL-SCNC: 1.3 MMOL/L (ref 0.5–2)
D-LACTATE SERPL-SCNC: 2.2 MMOL/L (ref 0.5–2)
D-LACTATE SERPL-SCNC: 2.3 MMOL/L (ref 0.5–2)
DEPRECATED RDW RBC AUTO: 46.1 FL (ref 37–54)
EGFRCR SERPLBLD CKD-EPI 2021: 65.2 ML/MIN/1.73
EOSINOPHIL # BLD AUTO: 0.12 10*3/MM3 (ref 0–0.4)
EOSINOPHIL NFR BLD AUTO: 0.7 % (ref 0.3–6.2)
ERYTHROCYTE [DISTWIDTH] IN BLOOD BY AUTOMATED COUNT: 13.7 % (ref 12.3–15.4)
FLUAV RNA RESP QL NAA+PROBE: NOT DETECTED
FLUBV RNA RESP QL NAA+PROBE: NOT DETECTED
GLOBULIN UR ELPH-MCNC: 3.8 GM/DL
GLUCOSE SERPL-MCNC: 138 MG/DL (ref 65–99)
GLUCOSE UR STRIP-MCNC: NEGATIVE MG/DL
HCT VFR BLD AUTO: 46.8 % (ref 37.5–51)
HGB BLD-MCNC: 16.1 G/DL (ref 13–17.7)
HGB UR QL STRIP.AUTO: NEGATIVE
HOLD SPECIMEN: NORMAL
HYALINE CASTS UR QL AUTO: ABNORMAL /LPF
IMM GRANULOCYTES # BLD AUTO: 0.26 10*3/MM3 (ref 0–0.05)
IMM GRANULOCYTES NFR BLD AUTO: 1.4 % (ref 0–0.5)
KETONES UR QL STRIP: ABNORMAL
LEUKOCYTE ESTERASE UR QL STRIP.AUTO: NEGATIVE
LYMPHOCYTES # BLD AUTO: 0.81 10*3/MM3 (ref 0.7–3.1)
LYMPHOCYTES NFR BLD AUTO: 4.5 % (ref 19.6–45.3)
MAGNESIUM SERPL-MCNC: 1.4 MG/DL (ref 1.6–2.4)
MAGNESIUM SERPL-MCNC: 2.4 MG/DL (ref 1.6–2.4)
MCH RBC QN AUTO: 31.1 PG (ref 26.6–33)
MCHC RBC AUTO-ENTMCNC: 34.4 G/DL (ref 31.5–35.7)
MCV RBC AUTO: 90.3 FL (ref 79–97)
MONOCYTES # BLD AUTO: 1.19 10*3/MM3 (ref 0.1–0.9)
MONOCYTES NFR BLD AUTO: 6.6 % (ref 5–12)
NEUTROPHILS NFR BLD AUTO: 15.56 10*3/MM3 (ref 1.7–7)
NEUTROPHILS NFR BLD AUTO: 86.6 % (ref 42.7–76)
NITRITE UR QL STRIP: NEGATIVE
NRBC BLD AUTO-RTO: 0 /100 WBC (ref 0–0.2)
PH UR STRIP.AUTO: 6 [PH] (ref 5–8)
PHOSPHATE SERPL-MCNC: 2.6 MG/DL (ref 2.5–4.5)
PLATELET # BLD AUTO: 224 10*3/MM3 (ref 140–450)
PMV BLD AUTO: 12 FL (ref 6–12)
POTASSIUM SERPL-SCNC: 2.8 MMOL/L (ref 3.5–5.2)
POTASSIUM SERPL-SCNC: 2.8 MMOL/L (ref 3.5–5.2)
PROCALCITONIN SERPL-MCNC: 0.45 NG/ML (ref 0–0.25)
PROT SERPL-MCNC: 7.2 G/DL (ref 6–8.5)
PROT UR QL STRIP: ABNORMAL
QT INTERVAL: 432 MS
QTC INTERVAL: 640 MS
RBC # BLD AUTO: 5.18 10*6/MM3 (ref 4.14–5.8)
RBC # UR STRIP: ABNORMAL /HPF
REF LAB TEST METHOD: ABNORMAL
RSV RNA NPH QL NAA+NON-PROBE: NOT DETECTED
SARS-COV-2 RNA RESP QL NAA+PROBE: NOT DETECTED
SODIUM SERPL-SCNC: 136 MMOL/L (ref 136–145)
SP GR UR STRIP: 1.02 (ref 1–1.03)
SQUAMOUS #/AREA URNS HPF: ABNORMAL /HPF
TROPONIN T SERPL-MCNC: 0.01 NG/ML (ref 0–0.03)
TROPONIN T SERPL-MCNC: <0.01 NG/ML (ref 0–0.03)
TSH SERPL DL<=0.05 MIU/L-ACNC: 3.73 UIU/ML (ref 0.27–4.2)
UROBILINOGEN UR QL STRIP: ABNORMAL
WBC # UR STRIP: ABNORMAL /HPF
WBC NRBC COR # BLD: 17.97 10*3/MM3 (ref 3.4–10.8)
WHOLE BLOOD HOLD COAG: NORMAL
WHOLE BLOOD HOLD SPECIMEN: NORMAL

## 2023-01-04 PROCEDURE — 70450 CT HEAD/BRAIN W/O DYE: CPT

## 2023-01-04 PROCEDURE — 83735 ASSAY OF MAGNESIUM: CPT | Performed by: STUDENT IN AN ORGANIZED HEALTH CARE EDUCATION/TRAINING PROGRAM

## 2023-01-04 PROCEDURE — 84100 ASSAY OF PHOSPHORUS: CPT | Performed by: STUDENT IN AN ORGANIZED HEALTH CARE EDUCATION/TRAINING PROGRAM

## 2023-01-04 PROCEDURE — 80053 COMPREHEN METABOLIC PANEL: CPT | Performed by: EMERGENCY MEDICINE

## 2023-01-04 PROCEDURE — 85025 COMPLETE CBC W/AUTO DIFF WBC: CPT | Performed by: EMERGENCY MEDICINE

## 2023-01-04 PROCEDURE — 94799 UNLISTED PULMONARY SVC/PX: CPT

## 2023-01-04 PROCEDURE — 84484 ASSAY OF TROPONIN QUANT: CPT | Performed by: EMERGENCY MEDICINE

## 2023-01-04 PROCEDURE — 93010 ELECTROCARDIOGRAM REPORT: CPT | Performed by: INTERNAL MEDICINE

## 2023-01-04 PROCEDURE — 83735 ASSAY OF MAGNESIUM: CPT | Performed by: EMERGENCY MEDICINE

## 2023-01-04 PROCEDURE — 93005 ELECTROCARDIOGRAM TRACING: CPT | Performed by: STUDENT IN AN ORGANIZED HEALTH CARE EDUCATION/TRAINING PROGRAM

## 2023-01-04 PROCEDURE — P9612 CATHETERIZE FOR URINE SPEC: HCPCS

## 2023-01-04 PROCEDURE — 0 IOPAMIDOL PER 1 ML: Performed by: EMERGENCY MEDICINE

## 2023-01-04 PROCEDURE — 99285 EMERGENCY DEPT VISIT HI MDM: CPT

## 2023-01-04 PROCEDURE — 36415 COLL VENOUS BLD VENIPUNCTURE: CPT

## 2023-01-04 PROCEDURE — 84145 PROCALCITONIN (PCT): CPT | Performed by: NURSE PRACTITIONER

## 2023-01-04 PROCEDURE — 25010000002 ENOXAPARIN PER 10 MG: Performed by: STUDENT IN AN ORGANIZED HEALTH CARE EDUCATION/TRAINING PROGRAM

## 2023-01-04 PROCEDURE — 25010000002 AZITHROMYCIN PER 500 MG: Performed by: NURSE PRACTITIONER

## 2023-01-04 PROCEDURE — 87637 SARSCOV2&INF A&B&RSV AMP PRB: CPT | Performed by: NURSE PRACTITIONER

## 2023-01-04 PROCEDURE — 71045 X-RAY EXAM CHEST 1 VIEW: CPT

## 2023-01-04 PROCEDURE — 87641 MR-STAPH DNA AMP PROBE: CPT | Performed by: STUDENT IN AN ORGANIZED HEALTH CARE EDUCATION/TRAINING PROGRAM

## 2023-01-04 PROCEDURE — 81001 URINALYSIS AUTO W/SCOPE: CPT | Performed by: EMERGENCY MEDICINE

## 2023-01-04 PROCEDURE — 71275 CT ANGIOGRAPHY CHEST: CPT

## 2023-01-04 PROCEDURE — 25010000002 MAGNESIUM SULFATE IN D5W 1G/100ML (PREMIX) 1-5 GM/100ML-% SOLUTION: Performed by: NURSE PRACTITIONER

## 2023-01-04 PROCEDURE — 87449 NOS EACH ORGANISM AG IA: CPT | Performed by: STUDENT IN AN ORGANIZED HEALTH CARE EDUCATION/TRAINING PROGRAM

## 2023-01-04 PROCEDURE — 82306 VITAMIN D 25 HYDROXY: CPT | Performed by: STUDENT IN AN ORGANIZED HEALTH CARE EDUCATION/TRAINING PROGRAM

## 2023-01-04 PROCEDURE — 93005 ELECTROCARDIOGRAM TRACING: CPT | Performed by: EMERGENCY MEDICINE

## 2023-01-04 PROCEDURE — 25010000002 MAGNESIUM SULFATE 2 GM/50ML SOLUTION: Performed by: STUDENT IN AN ORGANIZED HEALTH CARE EDUCATION/TRAINING PROGRAM

## 2023-01-04 PROCEDURE — 84484 ASSAY OF TROPONIN QUANT: CPT | Performed by: STUDENT IN AN ORGANIZED HEALTH CARE EDUCATION/TRAINING PROGRAM

## 2023-01-04 PROCEDURE — 0 POTASSIUM CHLORIDE 10 MEQ/100ML SOLUTION: Performed by: NURSE PRACTITIONER

## 2023-01-04 PROCEDURE — 87899 AGENT NOS ASSAY W/OPTIC: CPT | Performed by: STUDENT IN AN ORGANIZED HEALTH CARE EDUCATION/TRAINING PROGRAM

## 2023-01-04 PROCEDURE — 99223 1ST HOSP IP/OBS HIGH 75: CPT | Performed by: STUDENT IN AN ORGANIZED HEALTH CARE EDUCATION/TRAINING PROGRAM

## 2023-01-04 PROCEDURE — 94761 N-INVAS EAR/PLS OXIMETRY MLT: CPT

## 2023-01-04 PROCEDURE — 25010000002 CEFTRIAXONE PER 250 MG: Performed by: NURSE PRACTITIONER

## 2023-01-04 PROCEDURE — 84132 ASSAY OF SERUM POTASSIUM: CPT | Performed by: STUDENT IN AN ORGANIZED HEALTH CARE EDUCATION/TRAINING PROGRAM

## 2023-01-04 PROCEDURE — 84443 ASSAY THYROID STIM HORMONE: CPT | Performed by: NURSE PRACTITIONER

## 2023-01-04 PROCEDURE — 87040 BLOOD CULTURE FOR BACTERIA: CPT | Performed by: NURSE PRACTITIONER

## 2023-01-04 PROCEDURE — 83605 ASSAY OF LACTIC ACID: CPT | Performed by: NURSE PRACTITIONER

## 2023-01-04 RX ORDER — IPRATROPIUM BROMIDE AND ALBUTEROL SULFATE 2.5; .5 MG/3ML; MG/3ML
3 SOLUTION RESPIRATORY (INHALATION) ONCE
Status: COMPLETED | OUTPATIENT
Start: 2023-01-04 | End: 2023-01-04

## 2023-01-04 RX ORDER — ENOXAPARIN SODIUM 100 MG/ML
40 INJECTION SUBCUTANEOUS DAILY
Status: DISCONTINUED | OUTPATIENT
Start: 2023-01-04 | End: 2023-01-12 | Stop reason: HOSPADM

## 2023-01-04 RX ORDER — SODIUM CHLORIDE 0.9 % (FLUSH) 0.9 %
10 SYRINGE (ML) INJECTION EVERY 12 HOURS SCHEDULED
Status: DISCONTINUED | OUTPATIENT
Start: 2023-01-04 | End: 2023-01-12 | Stop reason: HOSPADM

## 2023-01-04 RX ORDER — POTASSIUM CHLORIDE 7.45 MG/ML
10 INJECTION INTRAVENOUS
Status: DISCONTINUED | OUTPATIENT
Start: 2023-01-04 | End: 2023-01-12 | Stop reason: HOSPADM

## 2023-01-04 RX ORDER — ACETAMINOPHEN 325 MG/1
650 TABLET ORAL EVERY 4 HOURS PRN
Status: DISCONTINUED | OUTPATIENT
Start: 2023-01-04 | End: 2023-01-12 | Stop reason: HOSPADM

## 2023-01-04 RX ORDER — PROPRANOLOL HYDROCHLORIDE 10 MG/1
40 TABLET ORAL DAILY
Status: DISCONTINUED | OUTPATIENT
Start: 2023-01-05 | End: 2023-01-05

## 2023-01-04 RX ORDER — FOLIC ACID 1 MG/1
1 TABLET ORAL DAILY
Status: COMPLETED | OUTPATIENT
Start: 2023-01-05 | End: 2023-01-07

## 2023-01-04 RX ORDER — DIPHENOXYLATE HYDROCHLORIDE AND ATROPINE SULFATE 2.5; .025 MG/1; MG/1
1 TABLET ORAL DAILY
Status: COMPLETED | OUTPATIENT
Start: 2023-01-05 | End: 2023-01-07

## 2023-01-04 RX ORDER — POTASSIUM CHLORIDE 750 MG/1
40 CAPSULE, EXTENDED RELEASE ORAL AS NEEDED
Status: DISCONTINUED | OUTPATIENT
Start: 2023-01-04 | End: 2023-01-12 | Stop reason: HOSPADM

## 2023-01-04 RX ORDER — POTASSIUM CHLORIDE 1.5 G/1.77G
40 POWDER, FOR SOLUTION ORAL AS NEEDED
Status: DISCONTINUED | OUTPATIENT
Start: 2023-01-04 | End: 2023-01-12 | Stop reason: HOSPADM

## 2023-01-04 RX ORDER — CHOLECALCIFEROL (VITAMIN D3) 125 MCG
5 CAPSULE ORAL NIGHTLY PRN
Status: DISCONTINUED | OUTPATIENT
Start: 2023-01-04 | End: 2023-01-12 | Stop reason: HOSPADM

## 2023-01-04 RX ORDER — MAGNESIUM SULFATE HEPTAHYDRATE 40 MG/ML
2 INJECTION, SOLUTION INTRAVENOUS AS NEEDED
Status: DISCONTINUED | OUTPATIENT
Start: 2023-01-04 | End: 2023-01-12 | Stop reason: HOSPADM

## 2023-01-04 RX ORDER — MAGNESIUM SULFATE HEPTAHYDRATE 40 MG/ML
4 INJECTION, SOLUTION INTRAVENOUS AS NEEDED
Status: DISCONTINUED | OUTPATIENT
Start: 2023-01-04 | End: 2023-01-12 | Stop reason: HOSPADM

## 2023-01-04 RX ORDER — SODIUM CHLORIDE 0.9 % (FLUSH) 0.9 %
10 SYRINGE (ML) INJECTION AS NEEDED
Status: DISCONTINUED | OUTPATIENT
Start: 2023-01-04 | End: 2023-01-12 | Stop reason: HOSPADM

## 2023-01-04 RX ORDER — BISACODYL 5 MG/1
5 TABLET, DELAYED RELEASE ORAL DAILY PRN
Status: DISCONTINUED | OUTPATIENT
Start: 2023-01-04 | End: 2023-01-12 | Stop reason: HOSPADM

## 2023-01-04 RX ORDER — AMOXICILLIN 250 MG
2 CAPSULE ORAL 2 TIMES DAILY
Status: DISCONTINUED | OUTPATIENT
Start: 2023-01-04 | End: 2023-01-12 | Stop reason: HOSPADM

## 2023-01-04 RX ORDER — DOXYCYCLINE 100 MG/1
100 CAPSULE ORAL EVERY 12 HOURS SCHEDULED
Status: COMPLETED | OUTPATIENT
Start: 2023-01-04 | End: 2023-01-08

## 2023-01-04 RX ORDER — SODIUM CHLORIDE, SODIUM LACTATE, POTASSIUM CHLORIDE, CALCIUM CHLORIDE 600; 310; 30; 20 MG/100ML; MG/100ML; MG/100ML; MG/100ML
100 INJECTION, SOLUTION INTRAVENOUS CONTINUOUS
Status: ACTIVE | OUTPATIENT
Start: 2023-01-04 | End: 2023-01-05

## 2023-01-04 RX ORDER — BISACODYL 10 MG
10 SUPPOSITORY, RECTAL RECTAL DAILY PRN
Status: DISCONTINUED | OUTPATIENT
Start: 2023-01-04 | End: 2023-01-12 | Stop reason: HOSPADM

## 2023-01-04 RX ORDER — THIAMINE HYDROCHLORIDE 100 MG/ML
100 INJECTION, SOLUTION INTRAMUSCULAR; INTRAVENOUS ONCE
Status: COMPLETED | OUTPATIENT
Start: 2023-01-04 | End: 2023-01-04

## 2023-01-04 RX ORDER — POTASSIUM CHLORIDE 750 MG/1
20 CAPSULE, EXTENDED RELEASE ORAL ONCE
Status: COMPLETED | OUTPATIENT
Start: 2023-01-04 | End: 2023-01-04

## 2023-01-04 RX ORDER — MAGNESIUM SULFATE 1 G/100ML
1 INJECTION INTRAVENOUS ONCE
Status: COMPLETED | OUTPATIENT
Start: 2023-01-04 | End: 2023-01-04

## 2023-01-04 RX ORDER — SODIUM CHLORIDE 9 MG/ML
40 INJECTION, SOLUTION INTRAVENOUS AS NEEDED
Status: DISCONTINUED | OUTPATIENT
Start: 2023-01-04 | End: 2023-01-12 | Stop reason: HOSPADM

## 2023-01-04 RX ORDER — POLYETHYLENE GLYCOL 3350 17 G/17G
17 POWDER, FOR SOLUTION ORAL DAILY PRN
Status: DISCONTINUED | OUTPATIENT
Start: 2023-01-04 | End: 2023-01-12 | Stop reason: HOSPADM

## 2023-01-04 RX ORDER — ONDANSETRON 2 MG/ML
4 INJECTION INTRAMUSCULAR; INTRAVENOUS EVERY 6 HOURS PRN
Status: DISCONTINUED | OUTPATIENT
Start: 2023-01-04 | End: 2023-01-04

## 2023-01-04 RX ORDER — POTASSIUM CHLORIDE 7.45 MG/ML
10 INJECTION INTRAVENOUS ONCE
Status: COMPLETED | OUTPATIENT
Start: 2023-01-04 | End: 2023-01-04

## 2023-01-04 RX ORDER — LIDOCAINE 50 MG/G
1 PATCH TOPICAL
Status: DISCONTINUED | OUTPATIENT
Start: 2023-01-05 | End: 2023-01-12 | Stop reason: HOSPADM

## 2023-01-04 RX ADMIN — Medication 10 ML: at 21:20

## 2023-01-04 RX ADMIN — POTASSIUM CHLORIDE 40 MEQ: 750 CAPSULE, EXTENDED RELEASE ORAL at 21:19

## 2023-01-04 RX ADMIN — CEFTRIAXONE 2 G: 2 INJECTION, POWDER, FOR SOLUTION INTRAMUSCULAR; INTRAVENOUS at 12:00

## 2023-01-04 RX ADMIN — DOXYCYCLINE 100 MG: 100 CAPSULE ORAL at 15:53

## 2023-01-04 RX ADMIN — SODIUM CHLORIDE, POTASSIUM CHLORIDE, SODIUM LACTATE AND CALCIUM CHLORIDE 100 ML/HR: 600; 310; 30; 20 INJECTION, SOLUTION INTRAVENOUS at 17:53

## 2023-01-04 RX ADMIN — SODIUM CHLORIDE 1000 ML: 9 INJECTION, SOLUTION INTRAVENOUS at 14:03

## 2023-01-04 RX ADMIN — POTASSIUM CHLORIDE 40 MEQ: 750 CAPSULE, EXTENDED RELEASE ORAL at 17:32

## 2023-01-04 RX ADMIN — THIAMINE HCL TAB 100 MG 100 MG: 100 TAB at 19:23

## 2023-01-04 RX ADMIN — ENOXAPARIN SODIUM 40 MG: 40 INJECTION SUBCUTANEOUS at 17:33

## 2023-01-04 RX ADMIN — AZITHROMYCIN MONOHYDRATE 500 MG: 500 INJECTION, POWDER, LYOPHILIZED, FOR SOLUTION INTRAVENOUS at 14:06

## 2023-01-04 RX ADMIN — POTASSIUM CHLORIDE 20 MEQ: 750 CAPSULE, EXTENDED RELEASE ORAL at 14:02

## 2023-01-04 RX ADMIN — MAGNESIUM SULFATE HEPTAHYDRATE 2 G: 2 INJECTION, SOLUTION INTRAVENOUS at 19:23

## 2023-01-04 RX ADMIN — MAGNESIUM SULFATE HEPTAHYDRATE 2 G: 2 INJECTION, SOLUTION INTRAVENOUS at 17:32

## 2023-01-04 RX ADMIN — MAGNESIUM SULFATE IN DEXTROSE 1 G: 10 INJECTION, SOLUTION INTRAVENOUS at 15:40

## 2023-01-04 RX ADMIN — DOXYCYCLINE 100 MG: 100 CAPSULE ORAL at 21:19

## 2023-01-04 RX ADMIN — POTASSIUM CHLORIDE 10 MEQ: 7.46 INJECTION, SOLUTION INTRAVENOUS at 16:48

## 2023-01-04 RX ADMIN — IPRATROPIUM BROMIDE AND ALBUTEROL SULFATE 3 ML: 2.5; .5 SOLUTION RESPIRATORY (INHALATION) at 11:20

## 2023-01-04 RX ADMIN — SODIUM CHLORIDE 1000 ML: 9 INJECTION, SOLUTION INTRAVENOUS at 12:00

## 2023-01-04 RX ADMIN — IOPAMIDOL 85 ML: 755 INJECTION, SOLUTION INTRAVENOUS at 13:19

## 2023-01-04 RX ADMIN — MAGNESIUM SULFATE HEPTAHYDRATE 2 G: 2 INJECTION, SOLUTION INTRAVENOUS at 21:19

## 2023-01-04 NOTE — Clinical Note
Level of Care: Telemetry [5]   Diagnosis: Acute respiratory failure with hypoxia (HCC) [031621]   Admitting Physician: ANGIE ERVIN [[id number]]   Attending Physician: ANGIE ERVIN [[id number]]

## 2023-01-04 NOTE — H&P
"    [facility] Medicine Services  HISTORY AND PHYSICAL    Patient Name: Olu Henriquez  : 1950  MRN: [id number]  Primary Care Physician: Jesse De Anda MD  Date of admission: 2023    Subjective   Subjective     Chief Complaint:  Unable to care for self    HPI:  Olu Henriquez is a 72 y.o. male with history of hypertension, benign essential tremor, vitamin D deficiency, blindness, alcohol use disorder, who presented for evaluation of inability to care for self.  Patient has not left his home in approximately 6 months per his brother who is at bedside.  Patient reports vision has been very poor for years and he is \"essentially blind\".  He has been very weak for years, which has not changed recently.  He has fallen 10-12 times in the past 2 weeks.  He has had bowel and bladder incontinence for the past month.  Reports of intermittent cough, but denies any fevers or chills.  No GI or  symptoms.  He initially reported stopping drinking alcohol 1 month ago, but later reports that it was only 1 week ago.    In the ED, patient was afebrile, tachycardic to 129, which improved to 76, satting 88% on room air, which improved to 100% on 2 L nasal cannula.  Labs were notable for UA with protein in 7-12 RBCs, COVID and flu and RSV were negative, blood cultures were sent, creatinine 1.18, potassium 2.8, magnesium 1.4, WBC count 17.97 with left shift, lactic 2.3, procalcitonin 0.45, TSH 3.7.  CTA of the chest did not reveal any evidence of pulmonary embolism, but did show left upper and left lower lobe pneumonia with likely reactive borderline enlarged mediastinal lymph nodes.  He wa given ceftriaxone and azithromycin along with DuoNeb, magnesium, potassium, and 2L of IV fluid.          Review of Systems   Constitutional: Negative for chills, fever and unexpected weight change.   HENT: Positive for dental problem. Negative for congestion, mouth sores and rhinorrhea.    Eyes: Positive for " visual disturbance. Negative for pain.   Respiratory: Positive for cough. Negative for shortness of breath and wheezing.    Cardiovascular: Negative for chest pain, palpitations and leg swelling.   Gastrointestinal: Negative for abdominal pain, constipation, diarrhea, nausea and vomiting.   Genitourinary: Negative for difficulty urinating and dysuria.   Musculoskeletal: Positive for gait problem. Negative for back pain.   Skin: Positive for wound. Negative for rash.   Neurological: Positive for tremors. Negative for dizziness, seizures, syncope, speech difficulty, numbness and headaches.   Psychiatric/Behavioral: Negative for agitation and behavioral problems.        All other systems reviewed and are negative.     Personal History     Past Medical History:   Diagnosis Date   • Alcoholic (HCC)    • Anxiety    • Blindness    • Concussion 1970   • Depression    • Essential tremor    • Hypertension    • Pneumonia 2019   • Shingles 1965             Past Surgical History:   Procedure Laterality Date   • CHEST SURGERY  2009    Chest clean out aftrer PNA with multiple chest tubes   • EYE SURGERY     • TONSILLECTOMY         Family History:  family history includes Alcohol abuse in his paternal grandfather; Colon cancer in his father; Heart disease in his maternal grandfather; Hyperthyroidism in his brother; Lung disease in his paternal grandfather; No Known Problems in his daughter, maternal grandmother, paternal grandmother, sister, and sister; Other (age of onset: 87) in his mother. Otherwise pertinent FHx was reviewed and unremarkable.     Social History:  reports that he quit smoking 10 days ago. His smoking use included cigarettes. He has a 58.00 pack-year smoking history. He has never used smokeless tobacco. He reports current alcohol use of about 6.0 standard drinks per week. He reports that he does not use drugs.  Social History     Social History Narrative   • Not on file       Medications:  aspirin, ibuprofen, and  propranolol    No Known Allergies    Objective   Objective     Vital Signs:   Temp:  [98.2 °F (36.8 °C)-99.1 °F (37.3 °C)] 98.8 °F (37.1 °C)  Heart Rate:  [] 86  Resp:  [18-20] 18  BP: (108-154)/() 140/78  Flow (L/min):  [2] 2    Physical Exam   Constitutional: Awake, alert, unkept  Eyes: Pupils equal, sclerae anicteric, no conjunctival injection  HENT: NCAT, mucous membranes moist, very poor dentition  Neck: Supple, no thyromegaly, no lymphadenopathy, trachea midline  Respiratory: Left posterior lung fields with scattered crackles and rales, no wheezing, nonlabored respirations   Cardiovascular: RRR, no murmurs, rubs, or gallops  Gastrointestinal: Positive bowel sounds, soft, nontender, nondistended  Musculoskeletal: No bilateral ankle edema, no clubbing or cyanosis to extremities  Psychiatric: Appropriate affect, cooperative  Neurologic: Oriented x 3, strength symmetric in all extremities, Cranial Nerves grossly intact to confrontation, speech clear  Skin: No rashes on exposed skin    Result Review:  I have personally reviewed the results from the time of this admission to 1/4/2023 17:41 EST and agree with these findings:  []  Laboratory list / accordion  [x]  Microbiology  [x]  Radiology  []  EKG/Telemetry   []  Cardiology/Vascular   []  Pathology  [x]  Old records  []  Other:  Most notable findings include: as per HPI    LAB RESULTS:      Lab 01/04/23  1435 01/04/23  1113   WBC  --  17.97*   HEMOGLOBIN  --  16.1   HEMATOCRIT  --  46.8   PLATELETS  --  224   NEUTROS ABS  --  15.56*   IMMATURE GRANS (ABS)  --  0.26*   LYMPHS ABS  --  0.81   MONOS ABS  --  1.19*   EOS ABS  --  0.12   MCV  --  90.3   PROCALCITONIN  --  0.45*   LACTATE 2.2* 2.3*         Lab 01/04/23  1113   SODIUM 136   POTASSIUM 2.8*   CHLORIDE 97*   CO2 22.0   ANION GAP 17.0*   BUN 35*   CREATININE 1.18   EGFR 65.2   GLUCOSE 138*   CALCIUM 8.8   MAGNESIUM 1.4*   TSH 3.730         Lab 01/04/23  1113   TOTAL PROTEIN 7.2   ALBUMIN 3.4*    GLOBULIN 3.8   ALT (SGPT) 17   AST (SGOT) 40   BILIRUBIN 1.3*   ALK PHOS 73         Lab 01/04/23 1113   TROPONIN T <0.010                 Brief Urine Lab Results  (Last result in the past 365 days)      Color   Clarity   Blood   Leuk Est   Nitrite   Protein   CREAT   Urine HCG        01/04/23 1200 Dark Yellow   Clear   Negative   Negative   Negative   30 mg/dL (1+)               Microbiology Results (last 10 days)     Procedure Component Value - Date/Time    COVID PRE-OP / PRE-PROCEDURE SCREENING ORDER (NO ISOLATION) - Swab, Nasopharynx [[id number]]  (Normal) Collected: 01/04/23 1118    Lab Status: Final result Specimen: Swab from Nasopharynx Updated: 01/04/23 1212    Narrative:      The following orders were created for panel order COVID PRE-OP / PRE-PROCEDURE SCREENING ORDER (NO ISOLATION) - Swab, Nasopharynx.  Procedure                               Abnormality         Status                     ---------                               -----------         ------                     COVID-19, FLU A/B, RSV P...[[id number]]  Normal              Final result                 Please view results for these tests on the individual orders.    COVID-19, FLU A/B, RSV PCR - Swab, Nasopharynx [[id number]]  (Normal) Collected: 01/04/23 1118    Lab Status: Final result Specimen: Swab from Nasopharynx Updated: 01/04/23 1212     COVID19 Not Detected     Influenza A PCR Not Detected     Influenza B PCR Not Detected     RSV, PCR Not Detected    Narrative:      Fact sheet for providers: https://www.fda.gov/media/851724/download    Fact sheet for patients: https://www.fda.gov/media/947400/download    Test performed by PCR.          CT Head Without Contrast    Result Date: 1/4/2023  CT HEAD WO CONTRAST Date of Exam: 1/4/2023 2:47 PM EST Indication: ams. Comparison: None available. Technique: Axial CT images were obtained of the head without contrast administration.  Reconstructed coronal and sagittal images were also obtained.  Automated exposure control and iterative construction methods were used. FINDINGS: There is no evidence for acute intracranial hemorrhage. No definitive acute focal ischemia is identified. Nonspecific diffuse white matter changes are noted likely related to chronic small vessel ischemic changes and age-related changes. Associated diffuse volume loss is observed. A centralized pattern of volume loss is noted. There is therefore a prominent appearance of the ventricles felt to be related to volume loss. Communicating hydrocephalus is felt less likely. There is no evidence for abnormal cerebral edema. There is no mass effect or midline shift. The basal cisterns are patent. The skull is intact. The paranasal sinuses and mastoid air cells are clear.     Impression: 1.No evidence for acute intracranial abnormality. 2.Diffuse nonspecific white matter changes are noted with associated diffuse volume loss. These findings are likely related to chronic small vessel ischemic changes and/or age-related changes. 3.There appears to be a centralized pattern of volume loss which results in a prominent appearance of the ventricular system. Changes secondary to communicating hydrocephalus are felt less likely. Electronically Signed: Umer Kc  1/4/2023 3:07 PM EST  Workstation ID: [id number]    XR Chest 1 View    Result Date: 1/4/2023  XR CHEST 1 VW Date of Exam: 1/4/2023 10:46 AM EST Indication: Weakness, dizziness, altered mental status. Comparison: 1/26/2015. Findings: There is elevation of the left hemidiaphragm. There is abnormal consolidation in the left lung, particularly in the left lung base, felt to represent pneumonia. There is a questionable left basilar pleural effusion. The right lung and pleural space are clear. The heart is borderline enlarged. The pulmonary vascular markings are normal. There is a mild dextroscoliosis of the thoracolumbar spine. There are underlying degenerative changes.     Impression: Impression:  1. Elevation of the left hemidiaphragm. 2. Abnormal consolidation in the left lung consistent with pneumonia. 3. Questionable small left basilar pleural effusion. Electronically Signed: Reza Smith  1/4/2023 10:59 AM EST  Workstation ID: [id number]    CT Angiogram Chest    Result Date: 1/4/2023  CT ANGIOGRAM CHEST Date of Exam: 1/4/2023 1:00 PM EST Indication: pe. Comparison: None available. Technique: CTA of the chest was performed after the uneventful intravenous administration of 75 mL Isovue-370. Reconstructed coronal and sagittal images were also obtained. In addition, a 3-D volume rendered image was created for interpretation. Automated exposure control and iterative reconstruction methods were used. Findings: There is no pathologic axillary adenopathy or other worrisome body wall soft tissue finding in the chest. No acute findings are present in the partially characterized upper abdomen. There is no pleural or pericardial effusion. Mildly prominent, favored reactive mediastinal lymph nodes are present, for example an AP window lymph node measuring 11 mm short axis. Mildly atherosclerotic, nonaneurysmal thoracic aorta. The pulmonary arteries are well-opacified and without evidence of focal filling defect concerning for acute pulmonary embolus. Evaluation of the osseous structures demonstrates no evidence of acute fracture or aggressive osseous lesion, with multilevel thoracic spondylosis change present. Evaluation of the lung fields demonstrates areas of  peribronchial consolidation and groundglass opacity throughout the left upper and left lower lobes with most confluent consolidation present in the left lower lobe     Impression: Impression: No evidence of pulmonary embolus. Findings of left upper and left lower lobe pneumonia as above. Likely reactive borderline enlarged mediastinal lymph nodes are present. Electronically Signed: Zaki Minor  1/4/2023 1:32 PM EST  Workstation ID: [id number]          Assessment  & Plan   Assessment & Plan       Acute respiratory failure with hypoxia (HCC)    CAP (community acquired pneumonia)    Sepsis, unspecified organism (HCC)    Moderate dehydration    Hypokalemia    Hypomagnesemia    Physical deconditioning      Olu Henriquez is a 72 y.o. male with history of hypertension, benign essential tremor, vitamin D deficiency, blindness, alcohol use disorder, who presented for evaluation of difficulty breathing.     #Sepsis  #Lactic acidosis  #Acute hypoxemic respiratory failure secondary to community-acquired pneumonia  #Moderate dehydration  #Likely reactive borderline enlarged mediastinal lymph nodes  -Tachycardia to 129, WBC count 17.97, lactic 2.3, procalcitonin 0.45  -CTA of the chest did not reveal any evidence of pulmonary embolism, but did show left upper and left lower lobe pneumonia with likely reactive borderline enlarged mediastinal lymph nodes  -COVID and flu and RSV were negative  -Follow-up blood culture sent on 1/4  -Continue ceftriaxone and doxycycline for 5-day course  -MRSA nares, Legionella, strep pneumo screens pending  -Cardiac monitoring  -Status post 2 L IV fluids, additional liter of LR ordered  -Trend lactic    #Hypokalemia  #Hypomagnesemia  -Replace per protocol  -AM labs    #RBBB  #Prolonged QTc  -Given Azithromycin in the ER, this was discontinued  -ECHO pending  -Repeat EKG pending, replacing electrolytes  -Troponin pending (added to earlier labs)  -Cards consulted  -Tele monitoring    #UA with blood and protein  -Recommend outpatient follow-up with PCP    #Deconditioning  #Recurrent falls  #Bowel and bladder incontinence  #Blindness  -PT and OT consults; patient and his brother are interested in long-term placement following his hospitalization  -Follow-up CT head to evaluate for possible prior stroke vs NPH    #HTN  #Vitamin D deficiency  #Blindness  #Benign essential tremor  -Continue Propranolol 40mg once daily  -Repeat vitamin D level pending    #Alcohol  use disorder  -Reports no alcohol for the past week  -No history of alcohol withdrawal or alcohol withdrawal seizures  -We will give thiamine, folic acid  -[facility] protocol    DVT prophylaxis: Lovenox    CODE STATUS:  FULL  Level Of Support Discussed With: Patient  Code Status (Patient has no pulse and is not breathing): CPR (Attempt to Resuscitate)  Medical Interventions (Patient has pulse or is breathing): Full Support      Expected Discharge  Expected Discharge Date and Time     Expected Discharge Date Expected Discharge Time    Jan 6, 2023            This note has been completed as part of a split-shared workflow.     Signature: Electronically signed by Concepcion Mccarthy MD, 01/04/23, 5:50 PM EST

## 2023-01-04 NOTE — ED PROVIDER NOTES
Subjective   History of Present Illness  Patient presents to the ER for a fast heart rate in the 130s as well as oxygen saturation 88% on room air.  He tells me he is blind and he lives alone..  He tells me his brother dropped him off and his brother is the one who wanted him checked out as he became concerned about his breathing and getting around his apartment.  Patient is very pleasant overall he does not really have any concerns and does not feel short of breath and does not feel like his heart rate is fast.  Tells me he does have a history of alcoholism and he was admitted to the hospital many years ago for pneumonia.        Review of Systems    Past Medical History:   Diagnosis Date   • Alcoholic (HCC)    • Anxiety    • Concussion 1970   • Depression    • Hypertension    • Pneumonia 2019   • Shingles 1965       No Known Allergies    Past Surgical History:   Procedure Laterality Date   • CHEST SURGERY  2009    Chest clean out aftrer PNA with multiple chest tubes   • TONSILLECTOMY         Family History   Problem Relation Age of Onset   • No Known Problems Sister    • Hyperthyroidism Brother    • No Known Problems Daughter    • Other Mother 87        unknown   • Colon cancer Father    • No Known Problems Sister    • No Known Problems Maternal Grandmother    • Heart disease Maternal Grandfather    • No Known Problems Paternal Grandmother    • Alcohol abuse Paternal Grandfather    • Lung disease Paternal Grandfather         black lung       Social History     Socioeconomic History   • Marital status:    Tobacco Use   • Smoking status: Former     Packs/day: 1.00     Years: 58.00     Pack years: 58.00     Types: Cigarettes     Quit date: 9/12/2019     Years since quitting: 3.3   • Smokeless tobacco: Never   Substance and Sexual Activity   • Alcohol use: Yes     Alcohol/week: 2.0 standard drinks     Types: 2 Cans of beer per week     Comment: Very little anymore   • Drug use: Never   • Sexual activity: Not  Currently           Objective   Physical Exam  Constitutional:       Appearance: He is well-developed. He is ill-appearing.   HENT:      Head: Normocephalic and atraumatic.      Right Ear: External ear normal.      Left Ear: External ear normal.      Nose: Nose normal.   Eyes:      Conjunctiva/sclera: Conjunctivae normal.      Pupils: Pupils are equal, round, and reactive to light.   Cardiovascular:      Rate and Rhythm: Regular rhythm. Tachycardia present.      Heart sounds: Normal heart sounds.   Pulmonary:      Effort: Respiratory distress present.      Breath sounds: Wheezing present.   Abdominal:      General: Bowel sounds are normal.      Palpations: Abdomen is soft.   Musculoskeletal:         General: Normal range of motion.      Cervical back: Normal range of motion and neck supple.   Skin:     General: Skin is warm and dry.   Neurological:      Mental Status: He is alert and oriented to person, place, and time.   Psychiatric:         Behavior: Behavior normal.         Judgment: Judgment normal.         Critical Care  Performed by: Jamison Sosa APRN  Authorized by: Darshan Conti MD     Critical care provider statement:     Critical care time (minutes):  35    Critical care time was exclusive of:  Separately billable procedures and treating other patients    Critical care was necessary to treat or prevent imminent or life-threatening deterioration of the following conditions:  Respiratory failure and sepsis    Critical care was time spent personally by me on the following activities:  Ordering and performing treatments and interventions, ordering and review of laboratory studies, ordering and review of radiographic studies, pulse oximetry, re-evaluation of patient's condition, review of old charts, obtaining history from patient or surrogate, examination of patient, evaluation of patient's response to treatment, discussions with consultants and development of treatment plan with patient or  surrogate               ED Course  ED Course as of 01/04/23 1358   Wed Jan 04, 2023   1048 Case discussed with Dr. Conti. [JM]   1101 Patient on several liters nasal cannula.  His oxygen saturations in the low 90s.  He is able to speak without difficulty and have a great conversation.  He is very knowledgeable and does not appear to be confused.  Overall he tells me he is not have any chest pain or difficulty breathing.  I am concerned by his fast heart rate and his hypoxia.  We are still awaiting all blood work I will also need to get a CAT scan of his chest for further evaluation to rule out other causes including pulmonary embolism pneumonia etc.  I am awaiting to speak with his brother. [JM]   1214 Awaiting CAT scan and blood work [JM]   1310 Awaiting CAT scan [JM]   1310 Patient will need to be admitted related to his hypoxia and tachycardia.  Along with his hypokalemia and hypomagnesia.  We are awaiting the CTA for further evaluation of his chest. [JM]   1338 Patient will need critical care related to pneumonia hypoxia respiratory failure as well as IV replacement of potassium and magnesium.  Will admit to the hospitalist. [JM]   1351 I spoke with the brother at the . Pt lives alone. Brother feels pt has gotten more more confusion. Does not feel pt can go back home. Difficulty walking around. Finding directions. Worse over last several weeks. Will ct his head. [JM]   1357 Brother name is Jamison Henriquez [phone]. Call for any questions. [JM]      ED Course User Index  [JM] Jamison Sosa APRN                                           Medical Decision Making  Broad differential.  He is tachycardic and hypoxic.  We need to be concerned about coronary artery disease.  Arrhythmia.  Pulmonary embolism pneumonia sepsis etc.    Acute respiratory failure with hypoxia (HCC): acute illness or injury  Pneumonia due to infectious organism, unspecified laterality, unspecified part of lung: acute illness or injury  Sepsis  without acute organ dysfunction, due to unspecified organism (HCC): acute illness or injury  Amount and/or Complexity of Data Reviewed  External Data Reviewed: labs, radiology and ECG.  Labs: ordered. Decision-making details documented in ED Course.  Radiology: ordered and independent interpretation performed. Decision-making details documented in ED Course.  ECG/medicine tests: ordered. Decision-making details documented in ED Course.      Risk  Prescription drug management.  Decision regarding hospitalization.    Risk Details: Patient hypoxic with pneumonia.  He will need to be admitted.  Along with hypokalemia and hypomagnesia will need to be replaced.    Critical Care  Total time providing critical care: 30-74 minutes      Final diagnoses:   Acute respiratory failure with hypoxia (HCC)   Pneumonia due to infectious organism, unspecified laterality, unspecified part of lung   Sepsis without acute organ dysfunction, due to unspecified organism (HCC)   Hypokalemia   Hypomagnesemia       ED Disposition  ED Disposition     ED Disposition   Decision to Admit    Condition   --    Comment   Level of Care: Telemetry [5]   Diagnosis: Acute respiratory failure with hypoxia (HCC) [518385]   Admitting Physician: ANGIE ERVIN [[id number]]   Attending Physician: ANGIE ERVIN [[id number]]   Certification: I Certify That Inpatient Hospital Services Are Medically Necessary For Greater Than 2 Midnights               No follow-up provider specified.       Medication List      No changes were made to your prescriptions during this visit.          Jamison Sosa APRN  01/04/23 1342       Jamison Sosa APRN  01/04/23 1343       Jamison Sosa APRN  01/04/23 1357

## 2023-01-05 ENCOUNTER — APPOINTMENT (OUTPATIENT)
Dept: CARDIOLOGY | Facility: HOSPITAL | Age: 73
DRG: 871 | End: 2023-01-05
Payer: MEDICARE

## 2023-01-05 LAB
25(OH)D3 SERPL-MCNC: 64.6 NG/ML (ref 30–100)
ALBUMIN SERPL-MCNC: 3.1 G/DL (ref 3.5–5.2)
ALBUMIN/GLOB SERPL: 1.2 G/DL
ALP SERPL-CCNC: 73 U/L (ref 39–117)
ALT SERPL W P-5'-P-CCNC: 27 U/L (ref 1–41)
ANION GAP SERPL CALCULATED.3IONS-SCNC: 9 MMOL/L (ref 5–15)
ASCENDING AORTA: 3.7 CM
AST SERPL-CCNC: 62 U/L (ref 1–40)
BASOPHILS # BLD AUTO: 0.05 10*3/MM3 (ref 0–0.2)
BASOPHILS NFR BLD AUTO: 0.4 % (ref 0–1.5)
BH CV ECHO MEAS - AI P1/2T: 446.2 MSEC
BH CV ECHO MEAS - AO MAX PG: 4.7 MMHG
BH CV ECHO MEAS - AO MEAN PG: 2.5 MMHG
BH CV ECHO MEAS - AO ROOT AREA (BSA CORRECTED): 2 CM2
BH CV ECHO MEAS - AO ROOT DIAM: 4 CM
BH CV ECHO MEAS - AO V2 MAX: 108.1 CM/SEC
BH CV ECHO MEAS - AO V2 VTI: 20.6 CM
BH CV ECHO MEAS - AVA(I,D): 2.8 CM2
BH CV ECHO MEAS - EDV(CUBED): 46.7 ML
BH CV ECHO MEAS - EDV(MOD-SP2): 123 ML
BH CV ECHO MEAS - EDV(MOD-SP4): 136 ML
BH CV ECHO MEAS - EF(MOD-BP): 55.8 %
BH CV ECHO MEAS - EF(MOD-SP2): 57.3 %
BH CV ECHO MEAS - EF(MOD-SP4): 53.8 %
BH CV ECHO MEAS - ESV(CUBED): 17.6 ML
BH CV ECHO MEAS - ESV(MOD-SP2): 52.5 ML
BH CV ECHO MEAS - ESV(MOD-SP4): 62.9 ML
BH CV ECHO MEAS - FS: 27.8 %
BH CV ECHO MEAS - IVS/LVPW: 1.08 CM
BH CV ECHO MEAS - IVSD: 1.4 CM
BH CV ECHO MEAS - LA DIMENSION: 4.7 CM
BH CV ECHO MEAS - LAT PEAK E' VEL: 15.4 CM/SEC
BH CV ECHO MEAS - LV DIASTOLIC VOL/BSA (35-75): 69.6 CM2
BH CV ECHO MEAS - LV MASS(C)D: 169.8 GRAMS
BH CV ECHO MEAS - LV MAX PG: 4.2 MMHG
BH CV ECHO MEAS - LV MEAN PG: 2 MMHG
BH CV ECHO MEAS - LV SYSTOLIC VOL/BSA (12-30): 32.2 CM2
BH CV ECHO MEAS - LV V1 MAX: 102 CM/SEC
BH CV ECHO MEAS - LV V1 VTI: 16.7 CM
BH CV ECHO MEAS - LVIDD: 3.6 CM
BH CV ECHO MEAS - LVIDS: 2.6 CM
BH CV ECHO MEAS - LVOT AREA: 3.5 CM2
BH CV ECHO MEAS - LVOT DIAM: 2.1 CM
BH CV ECHO MEAS - LVPWD: 1.3 CM
BH CV ECHO MEAS - MED PEAK E' VEL: 7.7 CM/SEC
BH CV ECHO MEAS - MR MAX PG: 85.7 MMHG
BH CV ECHO MEAS - MR MAX VEL: 463 CM/SEC
BH CV ECHO MEAS - MR MEAN PG: 56 MMHG
BH CV ECHO MEAS - MR MEAN VEL: 349 CM/SEC
BH CV ECHO MEAS - MR VTI: 151 CM
BH CV ECHO MEAS - MV A MAX VEL: 56.9 CM/SEC
BH CV ECHO MEAS - MV DEC SLOPE: 632 CM/SEC2
BH CV ECHO MEAS - MV DEC TIME: 0.11 MSEC
BH CV ECHO MEAS - MV E MAX VEL: 111 CM/SEC
BH CV ECHO MEAS - MV E/A: 1.95
BH CV ECHO MEAS - MV MAX PG: 6 MMHG
BH CV ECHO MEAS - MV MEAN PG: 2 MMHG
BH CV ECHO MEAS - MV P1/2T: 57 MSEC
BH CV ECHO MEAS - MV V2 VTI: 23.9 CM
BH CV ECHO MEAS - MVA(P1/2T): 3.9 CM2
BH CV ECHO MEAS - MVA(VTI): 2.42 CM2
BH CV ECHO MEAS - PA ACC TIME: 0.1 SEC
BH CV ECHO MEAS - PA PR(ACCEL): 34.9 MMHG
BH CV ECHO MEAS - PA V2 MAX: 73.9 CM/SEC
BH CV ECHO MEAS - RAP SYSTOLE: 15 MMHG
BH CV ECHO MEAS - RVSP: 41 MMHG
BH CV ECHO MEAS - SI(MOD-SP2): 36.1 ML/M2
BH CV ECHO MEAS - SI(MOD-SP4): 37.4 ML/M2
BH CV ECHO MEAS - SV(LVOT): 57.8 ML
BH CV ECHO MEAS - SV(MOD-SP2): 70.5 ML
BH CV ECHO MEAS - SV(MOD-SP4): 73.1 ML
BH CV ECHO MEAS - TAPSE (>1.6): 1.32 CM
BH CV ECHO MEAS - TR MAX PG: 25.8 MMHG
BH CV ECHO MEAS - TR MAX VEL: 254 CM/SEC
BH CV ECHO MEASUREMENTS AVERAGE E/E' RATIO: 9.61
BH CV VAS BP LEFT ARM: NORMAL MMHG
BH CV XLRA - RV BASE: 4.2 CM
BH CV XLRA - RV LENGTH: 6.2 CM
BH CV XLRA - RV MID: 3 CM
BH CV XLRA - TDI S': 8.3 CM/SEC
BILIRUB SERPL-MCNC: 0.7 MG/DL (ref 0–1.2)
BUN SERPL-MCNC: 20 MG/DL (ref 8–23)
BUN/CREAT SERPL: 24.4 (ref 7–25)
CALCIUM SPEC-SCNC: 8.2 MG/DL (ref 8.6–10.5)
CHLORIDE SERPL-SCNC: 98 MMOL/L (ref 98–107)
CO2 SERPL-SCNC: 27 MMOL/L (ref 22–29)
CREAT SERPL-MCNC: 0.82 MG/DL (ref 0.76–1.27)
DEPRECATED RDW RBC AUTO: 47.1 FL (ref 37–54)
EGFRCR SERPLBLD CKD-EPI 2021: 92.8 ML/MIN/1.73
EOSINOPHIL # BLD AUTO: 0.01 10*3/MM3 (ref 0–0.4)
EOSINOPHIL NFR BLD AUTO: 0.1 % (ref 0.3–6.2)
ERYTHROCYTE [DISTWIDTH] IN BLOOD BY AUTOMATED COUNT: 13.8 % (ref 12.3–15.4)
GLOBULIN UR ELPH-MCNC: 2.6 GM/DL
GLUCOSE SERPL-MCNC: 115 MG/DL (ref 65–99)
HCT VFR BLD AUTO: 39.8 % (ref 37.5–51)
HGB BLD-MCNC: 13.5 G/DL (ref 13–17.7)
IMM GRANULOCYTES # BLD AUTO: 0.13 10*3/MM3 (ref 0–0.05)
IMM GRANULOCYTES NFR BLD AUTO: 1 % (ref 0–0.5)
L PNEUMO1 AG UR QL IA: NEGATIVE
LEFT ATRIUM VOLUME INDEX: 39.1 ML/M2
LYMPHOCYTES # BLD AUTO: 1.64 10*3/MM3 (ref 0.7–3.1)
LYMPHOCYTES NFR BLD AUTO: 12.8 % (ref 19.6–45.3)
MAGNESIUM SERPL-MCNC: 2 MG/DL (ref 1.6–2.4)
MAXIMAL PREDICTED HEART RATE: 147 BPM
MCH RBC QN AUTO: 31.2 PG (ref 26.6–33)
MCHC RBC AUTO-ENTMCNC: 33.9 G/DL (ref 31.5–35.7)
MCV RBC AUTO: 91.9 FL (ref 79–97)
MONOCYTES # BLD AUTO: 1.18 10*3/MM3 (ref 0.1–0.9)
MONOCYTES NFR BLD AUTO: 9.2 % (ref 5–12)
MRSA DNA SPEC QL NAA+PROBE: NEGATIVE
NEUTROPHILS NFR BLD AUTO: 76.5 % (ref 42.7–76)
NEUTROPHILS NFR BLD AUTO: 9.82 10*3/MM3 (ref 1.7–7)
NRBC BLD AUTO-RTO: 0 /100 WBC (ref 0–0.2)
NT-PROBNP SERPL-MCNC: 1435 PG/ML (ref 0–900)
PHOSPHATE SERPL-MCNC: 2.2 MG/DL (ref 2.5–4.5)
PLATELET # BLD AUTO: 178 10*3/MM3 (ref 140–450)
PMV BLD AUTO: 11.8 FL (ref 6–12)
POTASSIUM SERPL-SCNC: 3.6 MMOL/L (ref 3.5–5.2)
PROT SERPL-MCNC: 5.7 G/DL (ref 6–8.5)
QT INTERVAL: 414 MS
QTC INTERVAL: 517 MS
RBC # BLD AUTO: 4.33 10*6/MM3 (ref 4.14–5.8)
S PNEUM AG SPEC QL LA: NEGATIVE
SODIUM SERPL-SCNC: 134 MMOL/L (ref 136–145)
STRESS TARGET HR: 125 BPM
WBC NRBC COR # BLD: 12.83 10*3/MM3 (ref 3.4–10.8)

## 2023-01-05 PROCEDURE — 99232 SBSQ HOSP IP/OBS MODERATE 35: CPT | Performed by: HOSPITALIST

## 2023-01-05 PROCEDURE — 93306 TTE W/DOPPLER COMPLETE: CPT | Performed by: INTERNAL MEDICINE

## 2023-01-05 PROCEDURE — 25010000002 CEFTRIAXONE PER 250 MG: Performed by: STUDENT IN AN ORGANIZED HEALTH CARE EDUCATION/TRAINING PROGRAM

## 2023-01-05 PROCEDURE — 83880 ASSAY OF NATRIURETIC PEPTIDE: CPT | Performed by: INTERNAL MEDICINE

## 2023-01-05 PROCEDURE — 97535 SELF CARE MNGMENT TRAINING: CPT

## 2023-01-05 PROCEDURE — 97167 OT EVAL HIGH COMPLEX 60 MIN: CPT

## 2023-01-05 PROCEDURE — 97530 THERAPEUTIC ACTIVITIES: CPT

## 2023-01-05 PROCEDURE — 93306 TTE W/DOPPLER COMPLETE: CPT

## 2023-01-05 PROCEDURE — 83735 ASSAY OF MAGNESIUM: CPT | Performed by: STUDENT IN AN ORGANIZED HEALTH CARE EDUCATION/TRAINING PROGRAM

## 2023-01-05 PROCEDURE — 85025 COMPLETE CBC W/AUTO DIFF WBC: CPT | Performed by: STUDENT IN AN ORGANIZED HEALTH CARE EDUCATION/TRAINING PROGRAM

## 2023-01-05 PROCEDURE — 93010 ELECTROCARDIOGRAM REPORT: CPT | Performed by: INTERNAL MEDICINE

## 2023-01-05 PROCEDURE — 80053 COMPREHEN METABOLIC PANEL: CPT | Performed by: STUDENT IN AN ORGANIZED HEALTH CARE EDUCATION/TRAINING PROGRAM

## 2023-01-05 PROCEDURE — 97162 PT EVAL MOD COMPLEX 30 MIN: CPT

## 2023-01-05 PROCEDURE — 25010000002 ENOXAPARIN PER 10 MG: Performed by: STUDENT IN AN ORGANIZED HEALTH CARE EDUCATION/TRAINING PROGRAM

## 2023-01-05 PROCEDURE — 93005 ELECTROCARDIOGRAM TRACING: CPT | Performed by: STUDENT IN AN ORGANIZED HEALTH CARE EDUCATION/TRAINING PROGRAM

## 2023-01-05 PROCEDURE — 99222 1ST HOSP IP/OBS MODERATE 55: CPT | Performed by: INTERNAL MEDICINE

## 2023-01-05 PROCEDURE — 84100 ASSAY OF PHOSPHORUS: CPT | Performed by: STUDENT IN AN ORGANIZED HEALTH CARE EDUCATION/TRAINING PROGRAM

## 2023-01-05 RX ORDER — ASPIRIN 81 MG/1
81 TABLET ORAL DAILY
Status: DISCONTINUED | OUTPATIENT
Start: 2023-01-05 | End: 2023-01-12 | Stop reason: HOSPADM

## 2023-01-05 RX ORDER — PROPRANOLOL HYDROCHLORIDE 10 MG/1
20 TABLET ORAL DAILY
Status: DISCONTINUED | OUTPATIENT
Start: 2023-01-06 | End: 2023-01-06

## 2023-01-05 RX ORDER — BENZONATATE 100 MG/1
100 CAPSULE ORAL ONCE
Status: COMPLETED | OUTPATIENT
Start: 2023-01-05 | End: 2023-01-05

## 2023-01-05 RX ORDER — GUAIFENESIN 200 MG/10ML
200 LIQUID ORAL EVERY 6 HOURS PRN
Status: DISCONTINUED | OUTPATIENT
Start: 2023-01-05 | End: 2023-01-08

## 2023-01-05 RX ADMIN — SODIUM CHLORIDE 1 G: 900 INJECTION INTRAVENOUS at 00:14

## 2023-01-05 RX ADMIN — SODIUM CHLORIDE 1 G: 900 INJECTION INTRAVENOUS at 23:17

## 2023-01-05 RX ADMIN — Medication 10 ML: at 21:45

## 2023-01-05 RX ADMIN — SENNOSIDES AND DOCUSATE SODIUM 2 TABLET: 50; 8.6 TABLET ORAL at 21:44

## 2023-01-05 RX ADMIN — MULTIVITAMIN TABLET 1 TABLET: TABLET at 08:31

## 2023-01-05 RX ADMIN — POTASSIUM CHLORIDE 40 MEQ: 750 CAPSULE, EXTENDED RELEASE ORAL at 00:41

## 2023-01-05 RX ADMIN — DOXYCYCLINE 100 MG: 100 CAPSULE ORAL at 08:31

## 2023-01-05 RX ADMIN — THIAMINE HCL TAB 100 MG 100 MG: 100 TAB at 08:31

## 2023-01-05 RX ADMIN — SENNOSIDES AND DOCUSATE SODIUM 2 TABLET: 50; 8.6 TABLET ORAL at 08:31

## 2023-01-05 RX ADMIN — BENZONATATE 100 MG: 100 CAPSULE ORAL at 04:32

## 2023-01-05 RX ADMIN — Medication 10 ML: at 08:32

## 2023-01-05 RX ADMIN — ENOXAPARIN SODIUM 40 MG: 40 INJECTION SUBCUTANEOUS at 08:31

## 2023-01-05 RX ADMIN — ASPIRIN 81 MG: 81 TABLET, COATED ORAL at 13:45

## 2023-01-05 RX ADMIN — LIDOCAINE 1 PATCH: 50 PATCH CUTANEOUS at 00:14

## 2023-01-05 RX ADMIN — PROPRANOLOL HYDROCHLORIDE 40 MG: 10 TABLET ORAL at 08:31

## 2023-01-05 RX ADMIN — ACETAMINOPHEN 650 MG: 325 TABLET ORAL at 21:51

## 2023-01-05 RX ADMIN — DOXYCYCLINE 100 MG: 100 CAPSULE ORAL at 21:44

## 2023-01-05 RX ADMIN — FOLIC ACID 1 MG: 1 TABLET ORAL at 08:31

## 2023-01-05 RX ADMIN — GUAIFENESIN 200 MG: 200 SOLUTION ORAL at 21:44

## 2023-01-05 RX ADMIN — Medication 5 MG: at 00:14

## 2023-01-05 RX ADMIN — MICONAZOLE NITRATE: 20 POWDER TOPICAL at 21:45

## 2023-01-05 RX ADMIN — MICONAZOLE NITRATE: 20 POWDER TOPICAL at 13:45

## 2023-01-05 NOTE — THERAPY EVALUATION
Patient Name: Olu Henriquez  : 1950    MRN: [id number]                              Today's Date: 2023       Admit Date: 2023    Visit Dx:     ICD-10-CM ICD-9-CM   1. Acute respiratory failure with hypoxia (HCC)  J96.01 518.81   2. Pneumonia due to infectious organism, unspecified laterality, unspecified part of lung  J18.9 486   3. Sepsis without acute organ dysfunction, due to unspecified organism (HCC)  A41.9 038.9     995.91   4. Hypokalemia  E87.6 276.8   5. 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[facility])   • Medicare annual wellness visit, subsequent   • Vitamin D deficiency   • Acute respiratory failure with hypoxia ([facility])   • CAP (community acquired pneumonia)   • Sepsis, unspecified organism (HCC)   • Moderate dehydration   • Hypokalemia   • Hypomagnesemia   • Physical deconditioning     Past Medical History:   Diagnosis Date   • Alcoholic (HCC)    • Anxiety    • Blindness    • Concussion    • Depression    • Essential tremor    • Hypertension    • Pneumonia    • Shingles 1965     Past Surgical History:   Procedure Laterality Date   • CHEST SURGERY      Chest clean out aftrer PNA with multiple chest tubes   • EYE SURGERY     • TONSILLECTOMY        General Information     Row Name 23 1323          OT Time and Intention    Document Type evaluation  -     Mode of Treatment occupational therapy  -     Row Name 23 1323          General Information    Patient Profile Reviewed yes  -     Prior Level of Function independent:;all household mobility;community mobility;gait;transfer;bed mobility;ADL's  Prior to complete deterioration of vision. Reports 10+ falls recently.  -     Existing Precautions/Restrictions fall;oxygen therapy device and L/min;seizures;other (see comments)  tremors; blind; bowel and bladder incontinence; anxiety  -     Barriers to Rehab visual  deficit;ineffective coping   -     Row Name 01/05/23 1323          Occupational Profile    Environmental Supports and Barriers (Occupational Profile) Pt is very anxious and fearful of all movement due to poor vision.  -     Row Name 01/05/23 1323          Living Environment    People in Home alone  -     Row Name 01/05/23 1323          Home Main Entrance    Number of Stairs, Main Entrance one  -     Stair Railings, Main Entrance none  -     Row Name 01/05/23 1323          Stairs Within Home, Primary    Number of Stairs, Within Home, Primary none  -     Stair Railings, Within Home, Primary none  -     Row Name 01/05/23 1323          Cognition    Orientation Status (Cognition) oriented x 4  -     Row Name 01/05/23 1323          Safety Issues, Functional Mobility    Safety Issues Affecting Function (Mobility) safety precautions follow-through/compliance;safety precaution awareness;insight into deficits/self-awareness;awareness of need for assistance;friction/shear risk;impulsivity;judgment;at risk behavior observed;ability to follow commands;problem-solving;sequencing abilities  -     Impairments Affecting Function (Mobility) balance;coordination;endurance/activity tolerance;motor control;motor planning;pain;postural/trunk control;sensation/sensory awareness;shortness of breath;strength;visual/perceptual;cognition  -     Cognitive Impairments, Mobility Safety/Performance attention;awareness, need for assistance;insight into deficits/self-awareness;judgment;problem-solving/reasoning;sequencing abilities;safety precaution follow-through;safety precaution awareness;impulsivity  -           User Key  (r) = Recorded By, (t) = Taken By, (c) = Cosigned By    Initials Name Provider Type     Jodi Gorman OT Occupational Therapist                 Mobility/ADL's     Row Name 01/05/23 1326          Bed Mobility    Bed Mobility supine-sit;scooting/bridging  -     Scooting/Bridging [place] (Bed  Mobility) contact guard;verbal cues;nonverbal cues (demo/gesture)  -     Supine-Sit [place] (Bed Mobility) minimum assist (75% patient effort);2 person assist;verbal cues;nonverbal cues (demo/gesture)  -     Assistive Device (Bed Mobility) bed rails;head of bed elevated  -     Comment, (Bed Mobility) Slightly impulsive. Does not allow time for verbal cues and then becomes frustrated that cues are not provided. Pt continues to adjust to low vision and requiers encouragement.  -     Row Name 01/05/23 1326          Transfers    Transfers sit-stand transfer;toilet transfer  -     Row Name 01/05/23 1326          Bed-Chair Transfer    Bed-Chair [place] (Transfers) minimum assist (75% patient effort);2 person assist;verbal cues  -     Assistive Device (Bed-Chair Transfers) other (see comments)  BUE support  -     Row Name 01/05/23 1326          Sit-Stand Transfer    Sit-Stand [place] (Transfers) minimum assist (75% patient effort);2 person assist;verbal cues  -     Assistive Device (Sit-Stand Transfers) other (see comments)  UE support  -     Comment, (Sit-Stand Transfer) Pt stands impulsively.  -     Row Name 01/05/23 1326          Toilet Transfer    [place] Level (Toilet Transfer) 2 person assist;verbal cues;moderate assist (50% patient effort)  -     Assistive Device (Toilet Transfer) other (see comments);walker, front-wheeled  UE support for chair to BSC. used RW for balance during lucia care.  -     Row Name 01/05/23 1326          Functional Mobility    Functional Mobility- Ind. Level not appropriate to assess  -     Row Name 01/05/23 1326          Activities of Daily Living    BADL Assessment/Intervention lower body dressing;toileting;feeding  -     Row Name 01/05/23 1326          Lower Body Dressing Assessment/Training    [place] Level (Lower Body Dressing) dependent (less than 25% patient effort);lower body dressing skills  -     Row Name 01/05/23 1326           Toileting Assessment/Training    [place] Level (Toileting) maximum assist (25% patient effort);perform perineal hygiene  -     Position (Toileting) supported standing  -     Row Name 01/05/23 1326          Self-Feeding Assessment/Training    [place] Level (Feeding) prepare tray/open items;dependent (less than 25% patient effort);scoop food and bring to mouth;liquids to mouth;verbal cues;nonverbal cues (demo/gesture)  -     Position (Self-Feeding) supported sitting  -     Comment, (Feeding) Pt requires setup and extensive cues for all self feeding. Pt intially reporting he was unable to complete. Pt educated on food placement using clock idenfiers for placement. Pt with good self feeding this date and minimal spillage. Will require encouragement to complete. Extensive time/effort/cues for completion.  -           User Key  (r) = Recorded By, (t) = Taken By, (c) = Cosigned By    Initials Name Provider Type     Jodi Gorman OT Occupational Therapist               Obj/Interventions     Row Name 01/05/23 1330          Sensory Assessment (Somatosensory)    Sensory Assessment (Somatosensory) bilateral UE  -     Bilateral UE Sensory Assessment light touch awareness;impaired  -     Sensory Subjective Reports tingling;numbness  -     Sensory Assessment poor sensation in hands bilaterally. Reports numbness and tingling.  -     Row Name 01/05/23 1330          Vision Assessment/Intervention    Visual Impairment/Limitations legally blind;visual/perceptual impairments present;other (see comments)  blind  -     Row Name 01/05/23 1330          Range of Motion Comprehensive    General Range of Motion no range of motion deficits identified  -     Comment, General Range of Motion BUE AROM WFL  -     Row Name 01/05/23 1330          Strength Comprehensive (MMT)    General Manual Muscle Testing (MMT) Assessment upper extremity strength deficits identified  -     Comment, General Manual Muscle  Testing (MMT) Assessment generally weak  -     Row Name 01/05/23 1330          Upper Extremity (Manual Muscle Testing)    Upper Extremity: Manual Muscle Testing (MMT) right shoulder strength deficit;left shoulder strength deficit  -     Row Name 01/05/23 1330          Motor Skills    Motor Skills coordination;functional endurance  -     Coordination fine motor deficit;gross motor deficit;bilateral;upper extremity;moderate impairment  -     Functional Endurance decreased  -     Row Name 01/05/23 1330          Balance    Balance Assessment sitting static balance;sitting dynamic balance;standing static balance;standing dynamic balance  -     Static Sitting Balance supervision;verbal cues  -     Dynamic Sitting Balance contact guard;verbal cues  -     Position, Sitting Balance unsupported;sitting edge of bed  -     Static Standing Balance minimal assist  -     Dynamic Standing Balance moderate assist  -     Position/Device Used, Standing Balance supported;walker, rolling  -     Balance Interventions sitting;occupation based/functional task  -     Row Name 01/05/23 1330          Right Shoulder (Manual Muscle Testing)    Right Shoulder Manual Muscle Testing (MMT) flexion  -HM     MMT: Flexion, Right Shoulder flexion  -     MMT, Gross Movement: Right Shoulder Flexion (4/5) good  -HM     Row Name 01/05/23 1330          Left Shoulder (Manual Muscle Testing)    Left Shoulder Manual Muscle Testing (MMT) flexion  -HM     MMT: Flexion, Left Shoulder flexion  -     MMT, Gross Movement: Left Shoulder Flexion (4-/5) good minus  -           User Key  (r) = Recorded By, (t) = Taken By, (c) = Cosigned By    Initials Name Provider Type     Jodi Gorman OT Occupational Therapist               Goals/Plan     Row Name 01/05/23 1336          Bed Mobility Goal 1 (OT)    Activity/Assistive Device (Bed Mobility Goal 1, OT) sit to supine/supine to sit;scooting  -     [place] Level/Cues Needed  (Bed Mobility Goal 1, OT) modified independence  -     Time Frame (Bed Mobility Goal 1, OT) by discharge;long term goal (LTG)  -HM     Progress/Outcomes (Bed Mobility Goal 1, OT) goal ongoing  [facility]     Row Name 01/05/23 1337          Transfer Goal 1 (OT)    Activity/Assistive Device (Transfer Goal 1, OT) sit-to-stand/stand-to-sit;bed-to-chair/chair-to-bed;toilet  -     [place] Level/Cues Needed (Transfer Goal 1, OT) minimum assist (75% or more patient effort);verbal cues required  -     Time Frame (Transfer Goal 1, OT) by discharge;long term goal (LTG)  -HM     Progress/Outcome (Transfer Goal 1, OT) goal ongoing  -     Row Name 01/05/23 1337          Toileting Goal 1 (OT)    Activity/Device (Toileting Goal 1, OT) adjust/manage clothing;perform perineal hygiene  -     [place] Level/Cues Needed (Toileting Goal 1, OT) moderate assist (50-74% patient effort);verbal cues required  -     Time Frame (Toileting Goal 1, OT) by discharge;long term goal (LTG)  -HM     Progress/Outcome (Toileting Goal 1, OT) goal ongoing  -     Row Name 01/05/23 1337          Grooming Goal 1 (OT)    Activity/Device (Grooming Goal 1, OT) hair care;oral care;wash face, hands  -     [place] (Grooming Goal 1, OT) moderate assist (50-74% patient effort);verbal cues required  -     Time Frame (Grooming Goal 1, OT) by discharge;long term goal (LTG)  -HM     Progress/Outcome (Grooming Goal 1, OT) goal ongoing  [facility]     Row Name 01/05/23 0787          Therapy Assessment/Plan (OT)    Planned Therapy Interventions (OT) adaptive equipment training;BADL retraining;functional balance retraining;occupation/activity based interventions;ROM/therapeutic exercise;transfer/mobility retraining  -           User Key  (r) = Recorded By, (t) = Taken By, (c) = Cosigned By    Initials Name Provider Type     Jodi Gorman, OT Occupational Therapist               Clinical Impression     Row Name 01/05/23 4497          Pain  "Assessment    Pretreatment Pain Rating 0/10 - no pain  -     Posttreatment Pain Rating 0/10 - no pain  -     Row Name 01/05/23 1332          Plan of Care Review    Plan of Care Reviewed With patient  -HM     Progress improving  -     Outcome Evaluation OT eval complete. Pt is pleasent and cooperative. Difficuly adjusting from low vision to now no vision. Pt is anxious and fearful during any mobility due to being unaware of surroundings. Pt requires extensive time/effort and cues. Completed bed mobility with minAx2 and transfers with minAx2 and UE support. Attempted use of RW to BSC however pt modAx2 with use of RW. More fearful with RW.  Pt required maxA for all lucia care and expect dependence for all dressing and bathing. Pt has been dependent for all self feeding since admit. Required encouragement to attempt due to pt stating it is just \"easier for someone to help me\". Pt educated on need to complete self feeding and why. Pt completed all self feeding of lunch with set up from OT and extensive verbal cues for placement of food on tray table and reminders of where things on table are. Recommend d/c to SNF.  -     Row Name 01/05/23 1002          Therapy Assessment/Plan (OT)    Patient/Family Therapy Goal Statement (OT) Pt would like to improve and return home.  -     Rehab Potential (OT) good, to achieve stated therapy goals  -     Criteria for Skilled Therapeutic Interventions Met (OT) yes;skilled treatment is necessary  -     Therapy Frequency (OT) daily  -     Row Name 01/05/23 1332          Therapy Plan Review/Discharge Plan (OT)    Anticipated Discharge Disposition (OT) skilled nursing facility  -     Row Name 01/05/23 1332          Vital Signs    Pre Systolic BP Rehab --  RN cleared for tx; VSS  -HM     O2 Delivery Pre Treatment supplemental O2  -HM     O2 Delivery Intra Treatment supplemental O2  -HM     O2 Delivery Post Treatment supplemental O2  -HM     Pre Patient Position Supine  -  "    Intra Patient Position Standing  -HM     Post Patient Position Sitting  -     Row Name 01/05/23 1332          Positioning and Restraints    Pre-Treatment Position in bed  -     Post Treatment Position chair  -HM     In Chair notified nsg;reclined;call light within reach;encouraged to call for assist;exit alarm on;waffle cushion  -           User Key  (r) = Recorded By, (t) = Taken By, (c) = Cosigned By    Initials Name Provider Type     Jodi Gorman, OT Occupational Therapist               Outcome Measures     Row Name 01/05/23 8167          How much help from another is currently needed...    Putting on and taking off regular lower body clothing? 1  -HM     Bathing (including washing, rinsing, and drying) 2  -HM     Toileting (which includes using toilet bed pan or urinal) 2  -HM     Putting on and taking off regular upper body clothing 3  -HM     Taking care of personal grooming (such as brushing teeth) 3  -HM     Eating meals 3  -     AM-PAC 6 Clicks Score (OT) 14  -     Row Name 01/05/23 0800          How much help from another person do you currently need...    Turning from your back to your side while in flat bed without using bedrails? 3  -LN     Moving from lying on back to sitting on the side of a flat bed without bedrails? 3  -LN     Moving to and from a bed to a chair (including a wheelchair)? 3  -LN     Standing up from a chair using your arms (e.g., wheelchair, bedside chair)? 3  -LN     Climbing 3-5 steps with a railing? 2  -LN     To walk in hospital room? 2  -LN     AM-PAC 6 Clicks Score (PT) 16  -LN     Highest level of mobility 5 --> Static standing  -LN     Row Name 01/05/23 2827          Functional Assessment    Outcome Measure Options AM-PAC 6 Clicks Daily Activity (OT)  -           User Key  (r) = Recorded By, (t) = Taken By, (c) = Cosigned By    Initials Name Provider Type    Sera Dennis, RN Registered Nurse     Jodi Gorman, OT Occupational Therapist                 Occupational Therapy Education     Title: PT OT SLP Therapies (In Progress)     Topic: Occupational Therapy (In Progress)     Point: ADL training (Done)     Description:   Instruct learner(s) on proper safety adaptation and remediation techniques during self care or transfers.   Instruct in proper use of assistive devices.              Learning Progress Summary           Patient Acceptance, E,TB,D, VU,NR by  at 1/5/2023 1339                   Point: Home exercise program (Not Started)     Description:   Instruct learner(s) on appropriate technique for monitoring, assisting and/or progressing therapeutic exercises/activities.              Learner Progress:  Not documented in this visit.          Point: Precautions (Done)     Description:   Instruct learner(s) on prescribed precautions during self-care and functional transfers.              Learning Progress Summary           Patient Acceptance, E,TB,D, VU,NR by  at 1/5/2023 1339                   Point: Body mechanics (Done)     Description:   Instruct learner(s) on proper positioning and spine alignment during self-care, functional mobility activities and/or exercises.              Learning Progress Summary           Patient Acceptance, E,TB,D, VU,NR by  at 1/5/2023 1339                               User Key     Initials Effective Dates Name Provider Type Discipline     10/25/22 -  Jodi Gorman OT Occupational Therapist OT              OT Recommendation and Plan  Planned Therapy Interventions (OT): adaptive equipment training, BADL retraining, functional balance retraining, occupation/activity based interventions, ROM/therapeutic exercise, transfer/mobility retraining  Therapy Frequency (OT): daily  Plan of Care Review  Plan of Care Reviewed With: patient  Progress: improving  Outcome Evaluation: OT eval complete. Pt is pleasent and cooperative. Difficuly adjusting from low vision to now no vision. Pt is anxious and fearful during any  "mobility due to being unaware of surroundings. Pt requires extensive time/effort and cues. Completed bed mobility with minAx2 and transfers with minAx2 and UE support. Attempted use of RW to BSC however pt modAx2 with use of RW. More fearful with RW.  Pt required maxA for all lucia care and expect dependence for all dressing and bathing. Pt has been dependent for all self feeding since admit. Required encouragement to attempt due to pt stating it is just \"easier for someone to help me\". Pt educated on need to complete self feeding and why. Pt completed all self feeding of lunch with set up from OT and extensive verbal cues for placement of food on tray table and reminders of where things on table are. Recommend d/c to SNF.     Time Calculation:    Time Calculation- OT     Row Name 01/05/23 1130             Time Calculation- OT    OT Start Time 1130  -HM      OT Received On 01/05/23  -      OT Goal Re-Cert Due Date 01/15/23  -HM         Timed Charges    34987 - OT Self Care/Mgmt Minutes 25  -HM         Untimed Charges    OT Eval/Re-eval Minutes 60  -HM         Total Minutes    Timed Charges Total Minutes 25  -HM      Untimed Charges Total Minutes 60  -HM       Total Minutes 85  -HM            User Key  (r) = Recorded By, (t) = Taken By, (c) = Cosigned By    Initials Name Provider Type     Jodi Gorman OT Occupational Therapist              Therapy Charges for Today     Code Description Service Date Service Provider Modifiers Qty    [id number]  OT SELF CARE/MGMT/TRAIN EA 15 MIN 1/5/2023 Jodi Gorman OT GO 2    99546743551 HC OT EVAL HIGH COMPLEXITY 4 1/5/2023 Jodi Gorman OT GO 1               Jodi Gorman OT  1/5/2023  "

## 2023-01-05 NOTE — THERAPY EVALUATION
Patient Name: Olu Henriquez  : 1950    MRN: [id number]                              Today's Date: 2023       Admit Date: 2023    Visit Dx:     ICD-10-CM ICD-9-CM   1. Acute respiratory failure with hypoxia (HCC)  J96.01 518.81   2. Pneumonia due to infectious organism, unspecified laterality, unspecified part of lung  J18.9 486   3. Sepsis without acute organ dysfunction, due to unspecified organism (HCC)  A41.9 038.9     995.91   4. Hypokalemia  E87.6 276.8   5. 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HCC)   • Medicare annual wellness visit, subsequent   • Vitamin D deficiency   • Acute respiratory failure with hypoxia (HCC)   • CAP (community acquired pneumonia)   • Sepsis, unspecified organism (HCC)   • Moderate dehydration   • Hypokalemia   • Hypomagnesemia   • Physical deconditioning     Past Medical History:   Diagnosis Date   • Alcoholic (HCC)    • Anxiety    • Blindness    • Concussion    • Depression    • Essential tremor    • Hypertension    • Pneumonia    • Shingles 1965     Past Surgical History:   Procedure Laterality Date   • CHEST SURGERY      Chest clean out aftrer PNA with multiple chest tubes   • EYE SURGERY     • TONSILLECTOMY        General Information     Row Name 23 1207          Physical Therapy Time and Intention    Document Type evaluation  -BA     Mode of Treatment physical therapy  -BA     Row Name 23 1207          General Information    Patient Profile Reviewed yes  -BA     Prior Level of Function independent:;all household mobility;gait;transfer;bed mobility;ADL's  Reported he uses a walker for ambulation.  Hx multiple recent falls with ~10 falls in the last 2 weeks.  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  Brother has been assisting.  -BA     Existing Precautions/Restrictions fall;oxygen therapy device and  L/min;seizures;other (see comments)  blind; BUE essential tremors; bladder/bowel incontinence; anxiety  -     Barriers to Rehab medically complex;previous functional deficit;visual deficit;ineffective coping  -     Row Name 01/05/23 1207          Living Environment    People in Home alone  -     Row Name 01/05/23 1207          Home Main Entrance    Number of Stairs, Main Entrance one  -BA     Stair Railings, Main Entrance none  -BA     Row Name 01/05/23 1207          Stairs Within Home, Primary    Number of Stairs, Within Home, Primary none  -BA     Row Name 01/05/23 1207          Cognition    Orientation Status (Cognition) oriented x 3  -BA     Row Name 01/05/23 1207          Safety Issues, Functional Mobility    Safety Issues Affecting Function (Mobility) awareness of need for assistance;insight into deficits/self-awareness;impulsivity;judgment;problem-solving;safety precaution awareness;safety precautions follow-through/compliance;sequencing abilities;at risk behavior observed;ability to follow commands  -     Impairments Affecting Function (Mobility) balance;coordination;endurance/activity tolerance;motor control;motor planning;pain;postural/trunk control;sensation/sensory awareness;shortness of breath;strength;visual/perceptual;cognition  -     Cognitive Impairments, Mobility Safety/Performance insight into deficits/self-awareness;problem-solving/reasoning;judgment;safety precaution awareness;safety precaution follow-through;impulsivity;awareness, need for assistance;sequencing abilities  -     Comment, Safety Issues/Impairments (Mobility) Anxious and becomes easily discouraged with self.  Requires constant VCs/TCs for reassurance, encouragment, and proprioception/sense of direction d/t blindness.  Some difficulty with command following at times d/t becoming anxious and talking over therapist.  -BA           User Key  (r) = Recorded By, (t) = Taken By, (c) = Cosigned By    Initials Name Provider  Type    BA Francisca Rodriguez, PT Physical Therapist               Mobility     Row Name 01/05/23 1217          Bed Mobility    Bed Mobility supine-sit;scooting/bridging  -     Scooting/Bridging [place] (Bed Mobility) contact guard;verbal cues;nonverbal cues (demo/gesture)  -     Supine-Sit [place] (Bed Mobility) minimum assist (75% patient effort);2 person assist;verbal cues;nonverbal cues (demo/gesture)  -     Assistive Device (Bed Mobility) bed rails;head of bed elevated  -     Comment, (Bed Mobility) Impulsive.  Requires frequent VCs/TCs for proprioception, direction, sequencing, and hand placement.  Reported no dizziness.  -     Row Name 01/05/23 1217          Bed-Chair Transfer    Bed-Chair [place] (Transfers) minimum assist (75% patient effort);moderate assist (50% patient effort);2 person assist;verbal cues  -     Assistive Device (Bed-Chair Transfers) other (see comments);walker, front-wheeled  BUE support transitioning to FWW  -BA     Comment, (Bed-Chair Transfer) Transferred from bed to chair and from chair to BSC with taking a few lateral steps toward L side with BUE support and minAx2.  Progressed to modAx2 with attempt at use of FWW when transferring from BSC back to chair with taking a few lateral steps toward R side.  VCs/TCs for sequencing of steps, direction, walker management, and hand placement with reaching back to feel for seated surface prior to sitting down.  Becomes anxious, unsteady, and fearful of falling.  -     Row Name 01/05/23 1217          Sit-Stand Transfer    Sit-Stand [place] (Transfers) minimum assist (75% patient effort);2 person assist;verbal cues  -     Assistive Device (Sit-Stand Transfers) other (see comments);walker, front-wheeled  BUE support transitioning to FWW  -BA     Comment, (Sit-Stand Transfer) STS x 3 reps from EOB, chair, and BSC.  Impulsive and attempts to stand prior to assistance being given.  VCs/TCs for sequencing and hand  placement.  -     Row Name 01/05/23 1217          Gait/Stairs (Locomotion)    Comment, (Gait/Stairs) Amb deferred d/t fatigue and increased anxiety with transfers.  -           User Key  (r) = Recorded By, (t) = Taken By, (c) = Cosigned By    Initials Name Provider Type     Francisca Rodriguez, PT Physical Therapist               Obj/Interventions     Row Name 01/05/23 1223          Range of Motion Comprehensive    General Range of Motion bilateral lower extremity ROM WFL  -     Row Name 01/05/23 1223          Strength Comprehensive (MMT)    General Manual Muscle Testing (MMT) Assessment lower extremity strength deficits identified  -     Comment, General Manual Muscle Testing (MMT) Assessment LLE grossly 4/5.  R hip and knee grossly 4-/5, R ankle grossly 4/5.  -     Row Name 01/05/23 1223          Motor Skills    Motor Skills coordination;functional endurance  -     Coordination gross motor deficit;bilateral;lower extremity;minimal impairment  -     Functional Endurance Decreased functional endurance.  Fatigues with activity and requires rest breaks.  -     Row Name 01/05/23 1223          Balance    Balance Assessment sitting static balance;sitting dynamic balance;sit to stand dynamic balance;standing static balance;standing dynamic balance  -     Static Sitting Balance standby assist;verbal cues  -     Dynamic Sitting Balance contact guard;verbal cues  -     Position, Sitting Balance unsupported;sitting edge of bed;sitting in chair;other (see comments)  sitting on BSC  -     Sit to Stand Dynamic Balance minimal assist;2-person assist;verbal cues  -BA     Static Standing Balance minimal assist;verbal cues  -     Dynamic Standing Balance minimal assist;moderate assist;2-person assist;verbal cues  -     Position/Device Used, Standing Balance supported;other (see comments);walker, front-wheeled  BUE support  -     Balance Interventions sitting;sit to  stand;standing;supported;static;dynamic;occupation based/functional task  -BA     Comment, Balance Mild/mod instability with standing and transfers with BUE support and FWW; no overt LOB noted.  Becomes unsteady with ataxic-like motions.  Highly anxious, with tremors, and fearful of falling.  Improved stability with use of BUE support vs. FWW at this time.  -     Row Name 01/05/23 1223          Sensory Assessment (Somatosensory)    Sensory Assessment (Somatosensory) LE sensation intact;bilateral UE  -BA     Bilateral UE Sensory Assessment light touch awareness;impaired;other (see comments)  Reported numbness in B hands and fingers.  -           User Key  (r) = Recorded By, (t) = Taken By, (c) = Cosigned By    Initials Name Provider Type    Francisca Guerra, PT Physical Therapist               Goals/Plan     Row Name 01/05/23 1409          Bed Mobility Goal 1 (PT)    Activity/Assistive Device (Bed Mobility Goal 1, PT) sit to supine/supine to sit  -BA     [place] Level/Cues Needed (Bed Mobility Goal 1, PT) standby assist  -BA     Time Frame (Bed Mobility Goal 1, PT) long term goal (LTG);2 weeks  -     Row Name 01/05/23 3109          Transfer Goal 1 (PT)    Activity/Assistive Device (Transfer Goal 1, PT) sit-to-stand/stand-to-sit;bed-to-chair/chair-to-bed;walker, rolling  -BA     [place] Level/Cues Needed (Transfer Goal 1, PT) contact guard required  -BA     Time Frame (Transfer Goal 1, PT) long term goal (LTG);2 weeks  -     Row Name 01/05/23 3981          Gait Training Goal 1 (PT)    Activity/Assistive Device (Gait Training Goal 1, PT) gait (walking locomotion);assistive device use;walker, rolling  -BA     [place] Level (Gait Training Goal 1, PT) contact guard required  -BA     Distance (Gait Training Goal 1, PT) 50  -BA     Time Frame (Gait Training Goal 1, PT) long term goal (LTG);2 weeks  -     Row Name 01/05/23 2923          Patient Education Goal (PT)    Activity (Patient Education  Goal, PT) HEP  -     [place]/Cues/Accuracy (Memory Goal 2, PT) demonstrates adequately  -BA     Time Frame (Patient Education Goal, PT) long term goal (LTG);2 weeks  -     Row Name 01/05/23 1409          Therapy Assessment/Plan (PT)    Planned Therapy Interventions (PT) balance training;bed mobility training;gait training;home exercise program;motor coordination training;neuromuscular re-education;patient/family education;postural re-education;strengthening;transfer training  -           User Key  (r) = Recorded By, (t) = Taken By, (c) = Cosigned By    Initials Name Provider Type     Francisca Rodriguez, PT Physical Therapist               Clinical Impression     Row Name 01/05/23 1401          Pain    Pretreatment Pain Rating 6/10  -BA     Posttreatment Pain Rating 6/10  -BA     Pain Location generalized  -     Pain Location - back  -     Pre/Posttreatment Pain Comment Tolerated activity; RN aware and managing.  -     Pain Intervention(s) Ambulation/increased activity;Repositioned  -     Row Name 01/05/23 1401          Plan of Care Review    Plan of Care Reviewed With patient;sibling  -     Outcome Evaluation PT initial Eval completed.  Pt presents with generalized weakness, balance impairments, visual deficits (now with complete blindness), decreased functional endurance, gait instability, and decreased indep/safety with functional mobility.  Supine to sit with minAx2.  STS x 3 reps with minAx2 and BUE support vs. FWW.  Bed to chair and chair to BSC transfer with minAx2 and BUE support.  BSC back to chair transfer with modAx2 with use of FWW.  Highly anxious throughout mobility requiring frequent VCs/TCs for reassurance, proprioception, and encouragement.  Pt warrants skilled IP PT to improve indep with mobility and return to PLOF.  Rec SNF upon d/c.  -     Row Name 01/05/23 1401          Therapy Assessment/Plan (PT)    Rehab Potential (PT) good, to achieve stated therapy goals  -      Criteria for Skilled Interventions Met (PT) yes;meets criteria;skilled treatment is necessary  -BA     Therapy Frequency (PT) daily  -BA     Row Name 01/05/23 1401          Vital Signs    Pre Systolic BP Rehab 122  -BA     Pre Treatment Diastolic BP 67  -BA     Post Systolic BP Rehab 140  -BA     Post Treatment Diastolic BP 85  -BA     Pretreatment Heart Rate (beats/min) 78  -BA     Posttreatment Heart Rate (beats/min) 70  -BA     Pre SpO2 (%) 94  -BA     O2 Delivery Pre Treatment nasal cannula  1L  -BA     O2 Delivery Intra Treatment nasal cannula  1L  -BA     Post SpO2 (%) 94  -BA     O2 Delivery Post Treatment nasal cannula  1L  -BA     Pre Patient Position Supine  -BA     Post Patient Position Sitting  -BA     Row Name 01/05/23 1401          Positioning and Restraints    Pre-Treatment Position in bed  -BA     Post Treatment Position chair  -BA     In Chair notified nsg;reclined;sitting;call light within reach;encouraged to call for assist;exit alarm on;waffle cushion;legs elevated  -BA           User Key  (r) = Recorded By, (t) = Taken By, (c) = Cosigned By    Initials Name Provider Type    Francisca Guerra, PT Physical Therapist               Outcome Measures     Row Name 01/05/23 1413 01/05/23 0800       How much help from another person do you currently need...    Turning from your back to your side while in flat bed without using bedrails? 3  -BA 3  -LN    Moving from lying on back to sitting on the side of a flat bed without bedrails? 3  -BA 3  -LN    Moving to and from a bed to a chair (including a wheelchair)? 3  -BA 3  -LN    Standing up from a chair using your arms (e.g., wheelchair, bedside chair)? 3  -BA 3  -LN    Climbing 3-5 steps with a railing? 2  -BA 2  -LN    To walk in hospital room? 2  -BA 2  -LN    AM-PAC 6 Clicks Score (PT) 16  -BA 16  -LN    Highest level of mobility 5 --> Static standing  -BA 5 --> Static standing  -LN    Row Name 01/05/23 1413 01/05/23 1337       Functional  Assessment    Outcome Measure Options AM-PAC 6 Clicks Basic Mobility (PT)  - AM-PAC 6 Clicks Daily Activity (OT)  -          User Key  (r) = Recorded By, (t) = Taken By, (c) = Cosigned By    Initials Name Provider Type    Sera Dennis, RN Registered Nurse     Jodi Gorman OT Occupational Therapist    Francisca Guerra, URSZULA Physical Therapist                             Physical Therapy Education     Title: PT OT SLP Therapies (In Progress)     Topic: Physical Therapy (In Progress)     Point: Mobility training (In Progress)     Learning Progress Summary           Patient Acceptance, E, NR by  at 1/5/2023 1414                   Point: Home exercise program (Not Started)     Learner Progress:  Not documented in this visit.          Point: Body mechanics (In Progress)     Learning Progress Summary           Patient Acceptance, E, NR by  at 1/5/2023 1414                   Point: Precautions (In Progress)     Learning Progress Summary           Patient Acceptance, E, NR by  at 1/5/2023 1414                               User Key     Initials Effective Dates Name Provider Type Discipline     09/21/21 -  Francisca Rodriguez, PT Physical Therapist PT              PT Recommendation and Plan  Planned Therapy Interventions (PT): balance training, bed mobility training, gait training, home exercise program, motor coordination training, neuromuscular re-education, patient/family education, postural re-education, strengthening, transfer training  Plan of Care Reviewed With: patient, sibling  Outcome Evaluation: PT initial Eval completed.  Pt presents with generalized weakness, balance impairments, visual deficits (now with complete blindness), decreased functional endurance, gait instability, and decreased indep/safety with functional mobility.  Supine to sit with minAx2.  STS x 3 reps with minAx2 and BUE support vs. FWW.  Bed to chair and chair to BSC transfer with minAx2 and BUE support.  BSC back to  chair transfer with modAx2 with use of FWW.  Highly anxious throughout mobility requiring frequent VCs/TCs for reassurance, proprioception, and encouragement.  Pt warrants skilled IP PT to improve indep with mobility and return to PLOF.  Rec SNF upon d/c.     Time Calculation:    PT Charges     Row Name 01/05/23 1415             Time Calculation    Start Time 1131  -BA      PT Received On 01/05/23  -BA      PT Goal Re-Cert Due Date 01/15/23  -BA         Time Calculation- PT    Total Timed Code Minutes- PT 11 minute(s)  -BA         Timed Charges    75333 - PT Therapeutic Activity Minutes 11  -BA         Untimed Charges    PT Eval/Re-eval Minutes 62  -BA         Total Minutes    Timed Charges Total Minutes 11  -BA      Untimed Charges Total Minutes 62  -BA       Total Minutes 73  -BA            User Key  (r) = Recorded By, (t) = Taken By, (c) = Cosigned By    Initials Name Provider Type    Francisca Guerra, PT Physical Therapist              Therapy Charges for Today     Code Description Service Date Service Provider Modifiers Qty    01132802618 HC PT THERAPEUTIC ACT EA 15 MIN 1/5/2023 Francisca Rodriguez, PT GP 1    58282411263 HC PT EVAL MOD COMPLEXITY 4 1/5/2023 Francisca Rodriguez, PT GP 1          PT G-Codes  Outcome Measure Options: AM-PAC 6 Clicks Basic Mobility (PT)  AM-PAC 6 Clicks Score (PT): 16  AM-PAC 6 Clicks Score (OT): 14  PT Discharge Summary  Anticipated Discharge Disposition (PT): skilled nursing facility    Francisca Rodriguez PT  1/5/2023

## 2023-01-05 NOTE — NURSING NOTE
WOC consult for: area near groin looks like a burn/  incontinence of bowel and bladder.    Patient with urine external catheter leaking, possibly trial small condom catheter?    He has irritant contact dermatitis related to urine to bilateral groin, scrotum, and perineum. Right groin/upper thigh worse than left. Scrotum with a few small partial thickness ulcerations as well.    Left hip with excoriation related to scratching or friction.    I will order micatin antifungal powder.     Use ph balanced foam soap and blue wipes to cleanse.    Still apply z guard over top of antifungal to protect.    Waffle mattress in place.    Will sign off.

## 2023-01-05 NOTE — PLAN OF CARE
Goal Outcome Evaluation:  Plan of Care Reviewed With: patient, sibling           Outcome Evaluation: PT initial Eval completed.  Pt presents with generalized weakness, balance impairments, visual deficits (now with complete blindness), decreased functional endurance, gait instability, and decreased indep/safety with functional mobility.  Supine to sit with minAx2.  STS x 3 reps with minAx2 and BUE support vs. FWW.  Bed to chair and chair to BSC transfer with minAx2 and BUE support.  BSC back to chair transfer with modAx2 with use of FWW.  Highly anxious throughout mobility requiring frequent VCs/TCs for reassurance, proprioception, and encouragement.  Pt warrants skilled IP PT to improve indep with mobility and return to PLOF.  Rec SNF upon d/c.

## 2023-01-05 NOTE — CONSULTS
[facility] at [facility]  Cardiovascular Consultation Note    Reason for consultation: #1 a flutter #2 tachybradycardia syndrome    History of Present Illness:  72-year-old male alcoholic with blindness depression essential tremor hypertension presents with weakness and inability to take care of himself.  While here he is noted to have an elevated lactic acid level as well as an elevated procalcitonin and CT evidence of pneumonia.  The patient states he is never had any cardiac problems.  He states that he is gradually lost his sight over the last couple months.  He is fallen multiple times but he says is because he cannot see any disoriented because of the blindness.  He denies dyspnea on exertion.  He denies tightness having squeezing pressure chest jaw throat arms.  He denies syncope.  No palpitations.  He lives at home alone he says he is not been eating well.  He denies any known blood in the stool never had a stroke or TIA.  He takes a lot of ibuprofen for chronic pain    Cardiac risk factors: #1 male sex #2 age greater than 65 #3 hypertension #4 former smoker    Past medical and surgical history, social and family history reviewed in EMR.    REVIEW OF SYSTEMS:     Past Medical History:   Diagnosis Date   • Alcoholic (HCC)    • Anxiety    • Blindness    • Concussion    • Depression    • Essential tremor    • Hypertension    • Pneumonia    • Shingles      Past Surgical History:   Procedure Laterality Date   • CHEST SURGERY      Chest clean out aftrer PNA with multiple chest tubes   • EYE SURGERY     • TONSILLECTOMY       Social History     Socioeconomic History   • Marital status:    Tobacco Use   • Smoking status: Former     Packs/day: 1.00     Years: 58.00     Pack years: 58.00     Types: Cigarettes     Quit date: 2022     Years since quittin.0   • Smokeless tobacco: Never   Substance and Sexual Activity   • Alcohol use: Yes     Alcohol/week: 6.0  standard drinks     Types: 2 Cans of beer, 4 Shots of liquor per week     Comment: Very little anymore; last 1 month ago   • Drug use: Never   • Sexual activity: Not Currently     Family History   Problem Relation Age of Onset   • No Known Problems Sister    • Hyperthyroidism Brother    • No Known Problems Daughter    • Other Mother 87        unknown   • Colon cancer Father    • No Known Problems Sister    • No Known Problems Maternal Grandmother    • Heart disease Maternal Grandfather    • No Known Problems Paternal Grandmother    • Alcohol abuse Paternal Grandfather    • Lung disease Paternal Grandfather         black lung       H&P ROS reviewed and pertinent CV ROS as noted in HPI.    Cardiac: No chest pain no palpitations no syncope no edema  Respiratory: No dyspnea hemoptysis or wheezing  Lower Extremities: No swelling or lesions  GI: No nausea vomiting diarrhea bright blood per rectum or melena  Neuro: No history of stroke TIA or neurologic event  Hematology: No bleeding diathesis ecchymosis or petechia  Renal: No history of kidney disease  Musculoskeletal: Patient complains of diffuse pain  Endocrine: No diabetes or hypothyroidism  Constitutional: No fever chills  Psych: No suicidal ideation      Physical Exam: General well-developed disheveled male not dyspneic not tachypneic current heart rate in the 70s       HEENT: No JVP or bruits.  No icterus       Respiratory: Equal bilateral symmetrical expansion good auscultation bilaterally       Cardiovascular: Irregular rate and rhythm with no murmur and no edema to palpation       GI: Soft flat nontender to palpation       Lower Extremities: No lesions       Neuro: Facial expressions are symmetrical moves all 4 extremities       Skin: Warm and dry no edema to palpation       Psych: Pleasant affect oriented x3    Results Review: CT of the head shows no acute findings.  I reviewed EKGs 1 EKG shows a flutter RVR with right bundle branch block pattern second EKG  shows a flutter controlled ventricular response.  Review of telemetry shows a pause up to 3.1-second and frequent episodes of bradycardia as well as tachycardia.  No BNP was obtained  I was in the room at the time of the echocardiogram he has normal LV and RV systolic function and wall motion.  As moderate MR and mild AI           Vital Sign Min/Max for last 24 hours  Temp  Min: 98.1 °F (36.7 °C)  Max: 99 °F (37.2 °C)   BP  Min: 108/93  Max: 154/72   Pulse  Min: 47  Max: 131   Resp  Min: 18  Max: 18   SpO2  Min: 90 %  Max: 97 %   No data recorded      Intake/Output Summary (Last 24 hours) at 1/5/2023 1029  Last data filed at 1/5/2023 0500  Gross per 24 hour   Intake 1310 ml   Output 100 ml   Net 1210 ml             Current Facility-Administered Medications:   •  acetaminophen (TYLENOL) tablet 650 mg, 650 mg, Oral, Q4H PRN, Concepcion Mccarthy MD  •  sennosides-docusate (PERICOLACE) 8.6-50 MG per tablet 2 tablet, 2 tablet, Oral, BID, 2 tablet at 01/05/23 0831 **AND** polyethylene glycol (MIRALAX) packet 17 g, 17 g, Oral, Daily PRN **AND** bisacodyl (DULCOLAX) EC tablet 5 mg, 5 mg, Oral, Daily PRN **AND** bisacodyl (DULCOLAX) suppository 10 mg, 10 mg, Rectal, Daily PRN, Concepcion Mccarthy MD  •  cefTRIAXone (ROCEPHIN) 1 g/100 mL 0.9% NS (MBP), 1 g, Intravenous, Q24H, Concepcion Mccarthy MD, 1 g at 01/05/23 0014  •  doxycycline (MONODOX) capsule 100 mg, 100 mg, Oral, Q12H, Concepcion Mccarthy MD, 100 mg at 01/05/23 0831  •  Enoxaparin Sodium (LOVENOX) syringe 40 mg, 40 mg, Subcutaneous, Daily, Concepcion Mccarthy MD, 40 mg at 01/05/23 0831  •  thiamine (VITAMIN B-1) tablet 100 mg, 100 mg, Oral, Daily, 100 mg at 01/05/23 0831 **AND** multivitamin (THERAGRAN) tablet 1 tablet, 1 tablet, Oral, Daily, 1 tablet at 01/05/23 0831 **AND** folic acid (FOLVITE) tablet 1 mg, 1 mg, Oral, Daily, Concepcion Mccarthy MD, 1 mg at 01/05/23 0831  •  lidocaine (LIDODERM) 5 % 1 patch, 1 patch, Transdermal, Q24H, Abdelrahman Oquendo-SATHISH Velazquez, 1 patch at 01/05/23 0014  •   Magnesium Sulfate 2 gram Bolus, followed by 8 gram infusion (total Mg dose 10 grams)- Mg less than or equal to 1mg/dL, 2 g, Intravenous, PRN **OR** Magnesium Sulfate 2 gram / 50mL Infusion (GIVE X 3 BAGS TO EQUAL 6GM TOTAL DOSE) - Mg 1.1 - 1.5 mg/dl, 2 g, Intravenous, PRN, Last Rate: 25 mL/hr at 01/04/23 2119, 2 g at 01/04/23 2119 **OR** Magnesium Sulfate 4 gram infusion- Mg 1.6-1.9 mg/dL, 4 g, Intravenous, PRN, Concepcion Mccarthy MD  •  melatonin tablet 5 mg, 5 mg, Oral, Nightly PRN, Floyd Oquendo PA-C, 5 mg at 01/05/23 0014  •  miconazole (MICOTIN) 2 % powder, , Topical, Q12H, Kenton Hartmann MD  •  potassium & sodium phosphates (PHOS-NAK) 280-160-250 MG packet - for Phosphorus less than 1.25 mg/dL, 2 packet, Oral, Q6H PRN **OR** potassium & sodium phosphates (PHOS-NAK) 280-160-250 MG packet - for Phosphorus 1.25 - 2.5 mg/dL, 2 packet, Oral, Q6H PRN, Concepcion Mccarthy MD  •  potassium chloride (MICRO-K) CR capsule 40 mEq, 40 mEq, Oral, PRN, 40 mEq at 01/05/23 0041 **OR** potassium chloride (KLOR-CON) packet 40 mEq, 40 mEq, Oral, PRN **OR** potassium chloride 10 mEq in 100 mL IVPB, 10 mEq, Intravenous, Q1H PRN, Concepcion Mccarthy MD  •  propranolol (INDERAL) tablet 40 mg, 40 mg, Oral, Daily, Concepcion Mccarthy MD, 40 mg at 01/05/23 0831  •  sodium chloride 0.9 % flush 10 mL, 10 mL, Intravenous, PRN, Concepcion Mccarthy MD  •  sodium chloride 0.9 % flush 10 mL, 10 mL, Intravenous, Q12H, Concepcion Mccarthy MD, 10 mL at 01/05/23 0832  •  sodium chloride 0.9 % flush 10 mL, 10 mL, Intravenous, PRN, Concepcion Mccarthy MD  •  sodium chloride 0.9 % infusion 40 mL, 40 mL, Intravenous, PRN, Concepcion Mccarthy MD    Lab Review:   Results from last 7 days   Lab Units 01/04/23  1113   WBC 10*3/mm3 17.97*   HEMOGLOBIN g/dL 16.1   PLATELETS 10*3/mm3 224     Results from last 7 days   Lab Units 01/04/23 2054 01/04/23  1113   SODIUM mmol/L  --  136   POTASSIUM mmol/L 2.8* 2.8*   CO2 mmol/L  --  22.0   BUN mg/dL  --  35*   CREATININE mg/dL  --  1.18    MAGNESIUM mg/dL 2.4 1.4*   PHOSPHORUS mg/dL 2.6  --    GLUCOSE mg/dL  --  138*     Estimated Creatinine Clearance: 61.2 mL/min (by C-G formula based on SCr of 1.18 mg/dL).        .lipd  Lab Results   Component Value Date    TRIG 74 12/12/2019    HDL 54 12/12/2019    AST 40 01/04/2023    ALT 17 01/04/2023       Radiology Reports:  Imaging Results (Last 72 Hours)     Procedure Component Value Units Date/Time    CT Head Without Contrast [[id number]] Collected: 01/04/23 1505     Updated: 01/04/23 1509    Narrative:      CT HEAD WO CONTRAST    Date of Exam: 1/4/2023 2:47 PM EST    Indication: ams.    Comparison: None available.    Technique: Axial CT images were obtained of the head without contrast administration.  Reconstructed coronal and sagittal images were also obtained. Automated exposure control and iterative construction methods were used.    FINDINGS:  There is no evidence for acute intracranial hemorrhage. No definitive acute focal ischemia is identified. Nonspecific diffuse white matter changes are noted likely related to chronic small vessel ischemic changes and age-related changes. Associated   diffuse volume loss is observed. A centralized pattern of volume loss is noted. There is therefore a prominent appearance of the ventricles felt to be related to volume loss. Communicating hydrocephalus is felt less likely. There is no evidence for   abnormal cerebral edema. There is no mass effect or midline shift. The basal cisterns are patent. The skull is intact. The paranasal sinuses and mastoid air cells are clear.      Impression:      1.No evidence for acute intracranial abnormality.  2.Diffuse nonspecific white matter changes are noted with associated diffuse volume loss. These findings are likely related to chronic small vessel ischemic changes and/or age-related changes.  3.There appears to be a centralized pattern of volume loss which results in a prominent appearance of the ventricular system. Changes  secondary to communicating hydrocephalus are felt less likely.    Electronically Signed: Umer Kc    1/4/2023 3:07 PM EST    Workstation ID: [id number]    CT Angiogram Chest [[id number]] Collected: 01/04/23 1325     Updated: 01/04/23 1451    Narrative:      CT ANGIOGRAM CHEST    Date of Exam: 1/4/2023 1:00 PM EST    Indication: pe.    Comparison: None available.    Technique: CTA of the chest was performed after the uneventful intravenous administration of 75 mL Isovue-370. Reconstructed coronal and sagittal images were also obtained. In addition, a 3-D volume rendered image was created for interpretation.   Automated exposure control and iterative reconstruction methods were used.     Findings:  There is no pathologic axillary adenopathy or other worrisome body wall soft tissue finding in the chest. No acute findings are present in the partially characterized upper abdomen. There is no pleural or pericardial effusion. Mildly prominent, favored   reactive mediastinal lymph nodes are present, for example an AP window lymph node measuring 11 mm short axis. Mildly atherosclerotic, nonaneurysmal thoracic aorta. The pulmonary arteries are well-opacified and without evidence of focal filling defect   concerning for acute pulmonary embolus. Evaluation of the osseous structures demonstrates no evidence of acute fracture or aggressive osseous lesion, with multilevel thoracic spondylosis change present. Evaluation of the lung fields demonstrates areas of   peribronchial consolidation and groundglass opacity throughout the left upper and left lower lobes with most confluent consolidation present in the left lower lobe        Impression:      Impression:  No evidence of pulmonary embolus. Findings of left upper and left lower lobe pneumonia as above. Likely reactive borderline enlarged mediastinal lymph nodes are present.    Electronically Signed: Zaki Minor    1/4/2023 1:32 PM EST    Workstation ID: [id number]    XR Chest  1 View [[id number]] Collected: 01/04/23 1057     Updated: 01/04/23 1451    Narrative:      XR CHEST 1 VW    Date of Exam: 1/4/2023 10:46 AM EST    Indication: Weakness, dizziness, altered mental status.    Comparison: 1/26/2015.    Findings:  There is elevation of the left hemidiaphragm. There is abnormal consolidation in the left lung, particularly in the left lung base, felt to represent pneumonia. There is a questionable left basilar pleural effusion. The right lung and pleural space are   clear. The heart is borderline enlarged. The pulmonary vascular markings are normal. There is a mild dextroscoliosis of the thoracolumbar spine. There are underlying degenerative changes.      Impression:      Impression:    1. Elevation of the left hemidiaphragm.  2. Abnormal consolidation in the left lung consistent with pneumonia.  3. Questionable small left basilar pleural effusion.    Electronically Signed: Reza Smith    1/4/2023 10:59 AM EST    Workstation ID: [id number]          Assessment/Plan: A flutter with tachybradycardia syndrome-at this time I will lower the patient's dose of propranolol to 20 mg.  He states his tremor is quite severe without propranolol.  He may need to have his tremor medicine change.  Unfortunately also has tachycardia.  Because of the patient's frequent falls is not a candidate for anticoagulation.  I talked to him about pacemaker in case one is needed.  We will monitor heart rate on lower dose propanolol.  I discussed the case with EP as well.  I think the patient does get a pacer a leadless is his  best option      Glenn Cruz MD  01/05/23  10:29 EST

## 2023-01-05 NOTE — PLAN OF CARE
"Goal Outcome Evaluation:  Plan of Care Reviewed With: patient        Progress: improving  Outcome Evaluation: OT claude complete. Pt is pleasent and cooperative. Difficuly adjusting from low vision to now no vision. Pt is anxious and fearful during any mobility due to being unaware of surroundings. Pt requires extensive time/effort and cues. Completed bed mobility with minAx2 and transfers with minAx2 and UE support. Attempted use of RW to BSC however pt modAx2 with use of RW. More fearful with RW.  Pt required maxA for all lucia care and expect dependence for all dressing and bathing. Pt has been dependent for all self feeding since admit. Required encouragement to attempt due to pt stating it is just \"easier for someone to help me\". Pt educated on need to complete self feeding and why. Pt completed all self feeding of lunch with set up from OT and extensive verbal cues for placement of food on tray table and reminders of where things on table are. Recommend d/c to SNF.  "

## 2023-01-06 ENCOUNTER — APPOINTMENT (OUTPATIENT)
Dept: ULTRASOUND IMAGING | Facility: HOSPITAL | Age: 73
DRG: 871 | End: 2023-01-06
Payer: MEDICARE

## 2023-01-06 LAB
ALBUMIN SERPL-MCNC: 3 G/DL (ref 3.5–5.2)
ALBUMIN/GLOB SERPL: 1.2 G/DL
ALP SERPL-CCNC: 66 U/L (ref 39–117)
ALT SERPL W P-5'-P-CCNC: 28 U/L (ref 1–41)
ANION GAP SERPL CALCULATED.3IONS-SCNC: 10 MMOL/L (ref 5–15)
AST SERPL-CCNC: 52 U/L (ref 1–40)
BILIRUB SERPL-MCNC: 0.5 MG/DL (ref 0–1.2)
BUN SERPL-MCNC: 19 MG/DL (ref 8–23)
BUN/CREAT SERPL: 25 (ref 7–25)
CALCIUM SPEC-SCNC: 8.5 MG/DL (ref 8.6–10.5)
CHLORIDE SERPL-SCNC: 101 MMOL/L (ref 98–107)
CO2 SERPL-SCNC: 26 MMOL/L (ref 22–29)
CREAT SERPL-MCNC: 0.76 MG/DL (ref 0.76–1.27)
DEPRECATED RDW RBC AUTO: 46.7 FL (ref 37–54)
EGFRCR SERPLBLD CKD-EPI 2021: 94.9 ML/MIN/1.73
ERYTHROCYTE [DISTWIDTH] IN BLOOD BY AUTOMATED COUNT: 13.7 % (ref 12.3–15.4)
GLOBULIN UR ELPH-MCNC: 2.5 GM/DL
GLUCOSE SERPL-MCNC: 90 MG/DL (ref 65–99)
HCT VFR BLD AUTO: 38 % (ref 37.5–51)
HGB BLD-MCNC: 12.6 G/DL (ref 13–17.7)
MCH RBC QN AUTO: 30.8 PG (ref 26.6–33)
MCHC RBC AUTO-ENTMCNC: 33.2 G/DL (ref 31.5–35.7)
MCV RBC AUTO: 92.9 FL (ref 79–97)
PHOSPHATE SERPL-MCNC: 3.4 MG/DL (ref 2.5–4.5)
PLATELET # BLD AUTO: 168 10*3/MM3 (ref 140–450)
PMV BLD AUTO: 11.9 FL (ref 6–12)
POTASSIUM SERPL-SCNC: 3.5 MMOL/L (ref 3.5–5.2)
PROT SERPL-MCNC: 5.5 G/DL (ref 6–8.5)
RBC # BLD AUTO: 4.09 10*6/MM3 (ref 4.14–5.8)
SODIUM SERPL-SCNC: 137 MMOL/L (ref 136–145)
WBC NRBC COR # BLD: 10.04 10*3/MM3 (ref 3.4–10.8)

## 2023-01-06 PROCEDURE — 76705 ECHO EXAM OF ABDOMEN: CPT

## 2023-01-06 PROCEDURE — 80053 COMPREHEN METABOLIC PANEL: CPT | Performed by: HOSPITALIST

## 2023-01-06 PROCEDURE — 99232 SBSQ HOSP IP/OBS MODERATE 35: CPT | Performed by: INTERNAL MEDICINE

## 2023-01-06 PROCEDURE — 85027 COMPLETE CBC AUTOMATED: CPT | Performed by: HOSPITALIST

## 2023-01-06 PROCEDURE — 25010000002 ENOXAPARIN PER 10 MG: Performed by: STUDENT IN AN ORGANIZED HEALTH CARE EDUCATION/TRAINING PROGRAM

## 2023-01-06 PROCEDURE — 99232 SBSQ HOSP IP/OBS MODERATE 35: CPT | Performed by: PHYSICIAN ASSISTANT

## 2023-01-06 PROCEDURE — 84100 ASSAY OF PHOSPHORUS: CPT | Performed by: HOSPITALIST

## 2023-01-06 PROCEDURE — 25010000002 CEFTRIAXONE PER 250 MG: Performed by: STUDENT IN AN ORGANIZED HEALTH CARE EDUCATION/TRAINING PROGRAM

## 2023-01-06 RX ADMIN — Medication 5 MG: at 21:11

## 2023-01-06 RX ADMIN — MICONAZOLE NITRATE: 20 POWDER TOPICAL at 09:01

## 2023-01-06 RX ADMIN — ASPIRIN 81 MG: 81 TABLET, COATED ORAL at 09:01

## 2023-01-06 RX ADMIN — Medication 10 ML: at 21:12

## 2023-01-06 RX ADMIN — ENOXAPARIN SODIUM 40 MG: 40 INJECTION SUBCUTANEOUS at 09:00

## 2023-01-06 RX ADMIN — DOXYCYCLINE 100 MG: 100 CAPSULE ORAL at 09:01

## 2023-01-06 RX ADMIN — DOXYCYCLINE 100 MG: 100 CAPSULE ORAL at 21:11

## 2023-01-06 RX ADMIN — DOXYLAMINE SUCCINATE 25 MG: 25 TABLET ORAL at 02:07

## 2023-01-06 RX ADMIN — FOLIC ACID 1 MG: 1 TABLET ORAL at 09:01

## 2023-01-06 RX ADMIN — LIDOCAINE 1 PATCH: 50 PATCH CUTANEOUS at 09:01

## 2023-01-06 RX ADMIN — THIAMINE HCL TAB 100 MG 100 MG: 100 TAB at 09:01

## 2023-01-06 RX ADMIN — SODIUM CHLORIDE 1 G: 900 INJECTION INTRAVENOUS at 23:48

## 2023-01-06 RX ADMIN — MULTIVITAMIN TABLET 1 TABLET: TABLET at 09:01

## 2023-01-06 RX ADMIN — MICONAZOLE NITRATE: 20 POWDER TOPICAL at 21:12

## 2023-01-06 NOTE — PROGRESS NOTES
[facility]  PROGRESS NOTE    Patient Name: Olu Henriquez  : 1950  MRN: [id number]    Date of Admission: 2023  Primary Care Physician: Jesse De Anda MD    Subjective   Subjective     CC:   Not able to care for himself    HPI:    No family in room.  He appears tremulous.  Looks a little weak and tired    ROS:    Gen- No fevers, chills  CV- No chest pain, palpitations  Resp- No cough, dyspnea  GI- No N/V/D, abd pain      Objective   Objective     Vital Signs:   Temp:  [98.1 °F (36.7 °C)-99.5 °F (37.5 °C)] 99.5 °F (37.5 °C)  Heart Rate:  [] 60  Resp:  [18] 18  BP: (115-159)/(67-93) 159/93  Flow (L/min):  [2] 2     Physical Exam:    Constitutional: No acute distress, awake, alert  HENT: NCAT, mucous membranes moist  Respiratory: Clear to auscultation bilaterally, respiratory effort normal   Cardiovascular:  RRR, s1 and s2  Gastrointestinal: Positive bowel sounds, soft, nontender, nondistended  Musculoskeletal: No bilateral ankle edema  Psychiatric: Appropriate affect, cooperative  Skin: No rashes    Results Reviewed:  LAB RESULTS:      Lab 23  10123  1435 23  1113   WBC 12.83*  --   --  17.97*   HEMOGLOBIN 13.5  --   --  16.1   HEMATOCRIT 39.8  --   --  46.8   PLATELETS 178  --   --  224   NEUTROS ABS 9.82*  --   --  15.56*   IMMATURE GRANS (ABS) 0.13*  --   --  0.26*   LYMPHS ABS 1.64  --   --  0.81   MONOS ABS 1.18*  --   --  1.19*   EOS ABS 0.01  --   --  0.12   MCV 91.9  --   --  90.3   PROCALCITONIN  --   --   --  0.45*   LACTATE  --  1.3 2.2* 2.3*         Lab 23  10123  1113   SODIUM 134*  --  136   POTASSIUM 3.6 2.8* 2.8*   CHLORIDE 98  --  97*   CO2 27.0  --  22.0   ANION GAP 9.0  --  17.0*   BUN 20  --  35*   CREATININE 0.82  --  1.18   EGFR 92.8  --  65.2   GLUCOSE 115*  --  138*   CALCIUM 8.2*  --  8.8   MAGNESIUM 2.0 2.4 1.4*   PHOSPHORUS 2.2* 2.6  --    TSH  --   --  3.730          Lab 01/05/23  1011 01/04/23  1113   TOTAL PROTEIN 5.7* 7.2   ALBUMIN 3.1* 3.4*   GLOBULIN 2.6 3.8   ALT (SGPT) 27 17   AST (SGOT) 62* 40   BILIRUBIN 0.7 1.3*   ALK PHOS 73 73         Lab 01/05/23  1011 01/04/23 2054 01/04/23  1113   PROBNP 1,435.0*  --   --    TROPONIN T  --  0.010 <0.010                 Brief Urine Lab Results  (Last result in the past 365 days)      Color   Clarity   Blood   Leuk Est   Nitrite   Protein   CREAT   Urine HCG        01/04/23 1200 Dark Yellow   Clear   Negative   Negative   Negative   30 mg/dL (1+)                 Microbiology Results Abnormal     Procedure Component Value - Date/Time    Blood Culture - Blood, Hand, Left [[id number]]  (Normal) Collected: 01/04/23 1120    Lab Status: Preliminary result Specimen: Blood from Hand, Left Updated: 01/05/23 1145     Blood Culture No growth at 24 hours    Blood Culture - Blood, Arm, Right [[id number]]  (Normal) Collected: 01/04/23 1115    Lab Status: Preliminary result Specimen: Blood from Arm, Right Updated: 01/05/23 1145     Blood Culture No growth at 24 hours    Legionella Antigen, Urine - Urine, Urine, Clean Catch [[id number]]  (Normal) Collected: 01/04/23 2333    Lab Status: Final result Specimen: Urine, Clean Catch Updated: 01/05/23 1048     LEGIONELLA ANTIGEN, URINE Negative    S. Pneumo Ag Urine or CSF - Urine, Urine, Clean Catch [[id number]]  (Normal) Collected: 01/04/23 2333    Lab Status: Final result Specimen: Urine, Clean Catch Updated: 01/05/23 1048     Strep Pneumo Ag Negative    MRSA Screen, PCR (Inpatient) - Swab, Nares [[id number]]  (Normal) Collected: 01/04/23 2125    Lab Status: Final result Specimen: Swab from Nares Updated: 01/05/23 0955     MRSA PCR Negative    Narrative:      The negative predictive value of this diagnostic test is high and should only be used to consider de-escalating anti-MRSA therapy. A positive result may indicate colonization with MRSA and must be correlated clinically.  MRSA Negative    COVID  PRE-OP / PRE-PROCEDURE SCREENING ORDER (NO ISOLATION) - Swab, Nasopharynx [[id number]]  (Normal) Collected: 01/04/23 1118    Lab Status: Final result Specimen: Swab from Nasopharynx Updated: 01/04/23 1212    Narrative:      The following orders were created for panel order COVID PRE-OP / PRE-PROCEDURE SCREENING ORDER (NO ISOLATION) - Swab, Nasopharynx.  Procedure                               Abnormality         Status                     ---------                               -----------         ------                     COVID-19, FLU A/B, RSV P...[[id number]]  Normal              Final result                 Please view results for these tests on the individual orders.    COVID-19, FLU A/B, RSV PCR - Swab, Nasopharynx [[id number]]  (Normal) Collected: 01/04/23 1118    Lab Status: Final result Specimen: Swab from Nasopharynx Updated: 01/04/23 1212     COVID19 Not Detected     Influenza A PCR Not Detected     Influenza B PCR Not Detected     RSV, PCR Not Detected    Narrative:      Fact sheet for providers: https://www.fda.gov/media/428279/download    Fact sheet for patients: https://www.fda.gov/media/297963/download    Test performed by PCR.          Adult Transthoracic Echo Complete W/ Cont if Necessary Per Protocol    Result Date: 1/5/2023  •  Left ventricular systolic function is normal. Left ventricular ejection fraction appears to be 56 - 60%. •  Left ventricular wall thickness is consistent with mild to moderate concentric hypertrophy. •  Mildly reduced right ventricular systolic function noted. •  Left atrial volume is moderately increased. •  The right atrial cavity is mildly dilated. •  Mild to moderate aortic valve regurgitation is present. •  Moderate mitral valve regurgitation is present. •  Mild tricuspid valve regurgitation is present. •  Estimated right ventricular systolic pressure from tricuspid regurgitation is mildly elevated (35-45 mmHg). •  Incidental finding of a large anechoic structure  adjacent or within the liver, measuring 21.0 x 12.8 cm.     CT Head Without Contrast    Result Date: 1/4/2023  CT HEAD WO CONTRAST Date of Exam: 1/4/2023 2:47 PM EST Indication: ams. Comparison: None available. Technique: Axial CT images were obtained of the head without contrast administration.  Reconstructed coronal and sagittal images were also obtained. Automated exposure control and iterative construction methods were used. FINDINGS: There is no evidence for acute intracranial hemorrhage. No definitive acute focal ischemia is identified. Nonspecific diffuse white matter changes are noted likely related to chronic small vessel ischemic changes and age-related changes. Associated diffuse volume loss is observed. A centralized pattern of volume loss is noted. There is therefore a prominent appearance of the ventricles felt to be related to volume loss. Communicating hydrocephalus is felt less likely. There is no evidence for abnormal cerebral edema. There is no mass effect or midline shift. The basal cisterns are patent. The skull is intact. The paranasal sinuses and mastoid air cells are clear.     Impression: 1.No evidence for acute intracranial abnormality. 2.Diffuse nonspecific white matter changes are noted with associated diffuse volume loss. These findings are likely related to chronic small vessel ischemic changes and/or age-related changes. 3.There appears to be a centralized pattern of volume loss which results in a prominent appearance of the ventricular system. Changes secondary to communicating hydrocephalus are felt less likely. Electronically Signed: Umer Kc  1/4/2023 3:07 PM EST  Workstation ID: [id number]    XR Chest 1 View    Result Date: 1/4/2023  XR CHEST 1 VW Date of Exam: 1/4/2023 10:46 AM EST Indication: Weakness, dizziness, altered mental status. Comparison: 1/26/2015. Findings: There is elevation of the left hemidiaphragm. There is abnormal consolidation in the left lung, particularly  in the left lung base, felt to represent pneumonia. There is a questionable left basilar pleural effusion. The right lung and pleural space are clear. The heart is borderline enlarged. The pulmonary vascular markings are normal. There is a mild dextroscoliosis of the thoracolumbar spine. There are underlying degenerative changes.     Impression: Impression: 1. Elevation of the left hemidiaphragm. 2. Abnormal consolidation in the left lung consistent with pneumonia. 3. Questionable small left basilar pleural effusion. Electronically Signed: Reza Smith  1/4/2023 10:59 AM EST  Workstation ID: [id number]    CT Angiogram Chest    Result Date: 1/4/2023  CT ANGIOGRAM CHEST Date of Exam: 1/4/2023 1:00 PM EST Indication: pe. Comparison: None available. Technique: CTA of the chest was performed after the uneventful intravenous administration of 75 mL Isovue-370. Reconstructed coronal and sagittal images were also obtained. In addition, a 3-D volume rendered image was created for interpretation. Automated exposure control and iterative reconstruction methods were used. Findings: There is no pathologic axillary adenopathy or other worrisome body wall soft tissue finding in the chest. No acute findings are present in the partially characterized upper abdomen. There is no pleural or pericardial effusion. Mildly prominent, favored reactive mediastinal lymph nodes are present, for example an AP window lymph node measuring 11 mm short axis. Mildly atherosclerotic, nonaneurysmal thoracic aorta. The pulmonary arteries are well-opacified and without evidence of focal filling defect concerning for acute pulmonary embolus. Evaluation of the osseous structures demonstrates no evidence of acute fracture or aggressive osseous lesion, with multilevel thoracic spondylosis change present. Evaluation of the lung fields demonstrates areas of  peribronchial consolidation and groundglass opacity throughout the left upper and left lower lobes with  most confluent consolidation present in the left lower lobe     Impression: Impression: No evidence of pulmonary embolus. Findings of left upper and left lower lobe pneumonia as above. Likely reactive borderline enlarged mediastinal lymph nodes are present. Electronically Signed: Zaki Minor  1/4/2023 1:32 PM EST  Workstation ID: [id number]      Results for orders placed during the hospital encounter of 01/04/23    Adult Transthoracic Echo Complete W/ Cont if Necessary Per Protocol    Interpretation Summary  •  Left ventricular systolic function is normal. Left ventricular ejection fraction appears to be 56 - 60%.  •  Left ventricular wall thickness is consistent with mild to moderate concentric hypertrophy.  •  Mildly reduced right ventricular systolic function noted.  •  Left atrial volume is moderately increased.  •  The right atrial cavity is mildly dilated.  •  Mild to moderate aortic valve regurgitation is present.  •  Moderate mitral valve regurgitation is present.  •  Mild tricuspid valve regurgitation is present.  •  Estimated right ventricular systolic pressure from tricuspid regurgitation is mildly elevated (35-45 mmHg).  •  Incidental finding of a large anechoic structure adjacent or within the liver, measuring 21.0 x 12.8 cm.      I have reviewed the medications:  Scheduled Meds:aspirin, 81 mg, Oral, Daily  cefTRIAXone, 1 g, Intravenous, Q24H  doxycycline, 100 mg, Oral, Q12H  enoxaparin, 40 mg, Subcutaneous, Daily  thiamine, 100 mg, Oral, Daily   And  multivitamin, 1 tablet, Oral, Daily   And  folic acid, 1 mg, Oral, Daily  lidocaine, 1 patch, Transdermal, Q24H  miconazole, , Topical, Q12H  [START ON 1/6/2023] propranolol, 20 mg, Oral, Daily  senna-docusate sodium, 2 tablet, Oral, BID  sodium chloride, 10 mL, Intravenous, Q12H      Continuous Infusions:   PRN Meds:.•  acetaminophen  •  senna-docusate sodium **AND** polyethylene glycol **AND** bisacodyl **AND** bisacodyl  •  doxylamine  •  guaifenesin  •   magnesium sulfate **OR** magnesium sulfate **OR** magnesium sulfate  •  melatonin  •  potassium & sodium phosphates **OR** potassium & sodium phosphates  •  potassium chloride **OR** potassium chloride **OR** potassium chloride  •  sodium chloride  •  sodium chloride  •  sodium chloride    Assessment & Plan   Assessment & Plan     Active Hospital Problems    Diagnosis  POA   • **Acute respiratory failure with hypoxia (HCC) [J96.01]  Yes   • CAP (community acquired pneumonia) [J18.9]  Yes   • Sepsis, unspecified organism (HCC) [A41.9]  Yes   • Moderate dehydration [E86.0]  Yes   • Hypokalemia [E87.6]  Yes   • Hypomagnesemia [E83.42]  Yes   • Physical deconditioning [R53.81]  Unknown      Resolved Hospital Problems   No resolved problems to display.        Brief Hospital Course to date:  Olu Henriquez is a 72 y.o. male with past medical history of hypertension, benign essential tremor, vitamin D deficiency, blindness, alcohol use disorder, who presented for evaluation of difficulty breathing.      Sepsis  Lactic acidosis  Acute hypoxemic respiratory failure secondary to community-acquired pneumonia  Moderate dehydration  Likely reactive borderline enlarged mediastinal lymph nodes  -Tachycardia to 129, WBC count 17.97, lactic 2.3, procalcitonin 0.45  -CTA of the chest did not reveal any evidence of pulmonary embolism, but did show left upper and left lower lobe pneumonia with likely reactive borderline enlarged mediastinal lymph nodes  -COVID and flu and RSV were negative  -Follow-up blood culture sent on 1/4  -Continue ceftriaxone and doxycycline for 5-day course  -MRSA nares, Legionella, strep pneumo screens pending  -Cardiac monitoring  -Status post 2 L IV fluids, additional liter of LR ordered  -Lactic acid improving 2.3-->2.2-->1.3     Hypokalemia  Hypomagnesemia  -Replace per protocol  -Potassium normal today  -Magnesium normal today  -Phosphorus low today     RBBB  Prolonged QTc  -Given Azithromycin in the  ER, this was discontinued  -ECHO pending  -Repeat EKG pending, replacing electrolytes  -Troponin pending (added to earlier labs)  -Cards consulted  -Tele monitoring     UA with blood and protein  -Recommend outpatient follow-up with PCP     Deconditioning  Recurrent falls  Bowel and bladder incontinence  Blindness  -PT and OT consults; patient and his brother are interested in long-term placement following his hospitalization  -CT head no acute abnormalities     HTN  Vitamin D deficiency  Blindness  Benign essential tremor  -Continue Propranolol 40mg once daily  -Vitamin D level within normal limits     Alcohol use disorder  -Reports no alcohol for the past week  -No history of alcohol withdrawal or alcohol withdrawal seizures  -We will give thiamine, folic acid  -[facility] protocol    Liver US    Expected Discharge Location and Transportation:   Skilled nursing facility  Expected Discharge  1/9  Expected Discharge Date and Time     Expected Discharge Date Expected Discharge Time    Jan 8, 2023            DVT prophylaxis:  Medical DVT prophylaxis orders are present.     AM-PAC 6 Clicks Score (PT): 16 (01/05/23 1413)    CODE STATUS:   Code Status and Medical Interventions:   Ordered at: 01/04/23 1452     Level Of Support Discussed With:    Patient     Code Status (Patient has no pulse and is not breathing):    CPR (Attempt to Resuscitate)     Medical Interventions (Patient has pulse or is breathing):    Full Support       Kenton Hartmann MD  01/05/23

## 2023-01-06 NOTE — CASE MANAGEMENT/SOCIAL WORK
"Discharge Planning Assessment  [facility]     Patient Name: Olu Henriquez  MRN: [id number]  Today's Date: 1/6/2023    Admit Date: 1/4/2023    Plan: SNF   Discharge Needs Assessment     Row Name 01/06/23 0946       Living Environment    People in Home alone    Current Living Arrangements apartment    Primary Care Provided by self;child(karen)  brother    Provides Primary Care For no one, unable/limited ability to care for self    Family Caregiver if Needed child(karen), adult;sibling(s)    Quality of Family Relationships helpful;involved;supportive    Able to Return to Prior Arrangements no       Transition Planning    Patient/Family Anticipates Transition to long-term care facility    Patient/Family Anticipated Services at Transition     Transportation Anticipated family or friend will provide       Discharge Needs Assessment    Readmission Within the Last 30 Days no previous admission in last 30 days    Equipment Currently Used at Home walker, rolling;grab bar;shower chair    Concerns to be Addressed discharge planning    Anticipated Changes Related to Illness inability to care for self    Equipment Needed After Discharge --  TBD    Outpatient/Agency/Support Group Needs skilled nursing facility    Discharge Facility/Level of Care Needs nursing facility, skilled;nursing facility, intermediate    Current Discharge Risk chronically ill;dependent with mobility/activities of daily living;lives alone;other (see comments)  Blind               Discharge Plan     Row Name 01/06/23 0954       Plan    Plan SNF    Patient/Family in Agreement with Plan yes    Plan Comments Met with pt at bedside for DCP.  He resides alone in [facility].  He reports new total blindness over the last month or so.  He currently requires assistance with all ADLs/IADLs.  He advised that his daughter and brother provide assistance with all needs.  They provide easy meals with \"leftovers\" for him to eat over several days.  He uses a RW at [facility].  No current HH services.  Per pt, his brother is looking into [facility] options.  CM discussed with pt that he is currently not appropriate for an [facility] as he requires min-mod assist of 2 (must be 1 person assist in [facility]).  As such, he will likely need SNF, and possibly LTC, in a nursing facility.  CM will make referrals when appropriate and closer to being medically ready for DC.  Will cont to follow.    Final Discharge Disposition Code 03 - skilled nursing facility (SNF)              Continued Care and Services - Admitted Since 1/4/2023    Coordination has not been started for this encounter.       Expected Discharge Date and Time     Expected Discharge Date Expected Discharge Time    Jan 8, 2023          Demographic Summary     Row Name 01/06/23 0945       General Information    Admission Type inpatient    Referral Source admission list    Reason for Consult discharge planning    Preferred Language English       Contact Information    Permission Granted to Share Info With     Contact Information Obtained for                Functional Status     Row Name 01/06/23 0945       Functional Status    Usual Activity Tolerance poor    Current Activity Tolerance poor       Functional Status, IADL    Medications completely dependent    Meal Preparation completely dependent    Housekeeping completely dependent    Laundry completely dependent    Shopping completely dependent               Psychosocial    No documentation.                Abuse/Neglect    No documentation.                Legal    No documentation.                Substance Abuse    No documentation.                Patient Forms    No documentation.                   Linnea Reeder RN

## 2023-01-06 NOTE — PROGRESS NOTES
[facility] Medicine Services  PROGRESS NOTE    Patient Name: Olu Henriquez  : 1950  MRN: [id number]    Date of Admission: 2023  Primary Care Physician: Jesse De Anda MD    Subjective     CC: f/u PNA     HPI:  In bed. He says he is tired. We discussed cardiology's concern for tachybrady syndrome and possible PPM placement - he says he hasn't agreed to anything. A liver lesion was also incidentally found on imaging - just recently finished liver US. Says he is not normally sick and feeling overwhelmed by everything happening at once.     ROS:  Gen- No fevers, chills  CV- No chest pain, palpitations  Resp- No cough, dyspnea  GI- No N/V/D, abd pain    Objective     Vital Signs:   Temp:  [97.8 °F (36.6 °C)-99.7 °F (37.6 °C)] 99.7 °F (37.6 °C)  Heart Rate:  [33-82] 82  Resp:  [17-18] 17  BP: (122-159)/(76-95) 151/95  Flow (L/min):  [2] 2     Physical Exam:  Constitutional: No acute distress, awake, alert and conversant.   HENT: NCAT, mucous membranes moist. Legally blind  Respiratory: Left sided rales, no wheezes or rhonchi, normal respiratory effort. On 1L NC   Cardiovascular: Rate 60's, regular rhythm without m/r/g  Gastrointestinal: Positive bowel sounds, soft, nontender, nondistended  Musculoskeletal: No bilateral ankle edema  Psychiatric: Appropriate affect, cooperative  Neurologic: Oriented x 3, moves all extremities spontaneously without focal deficits, speech clear    Results Reviewed:  LAB RESULTS:      Lab 23  0615 23  1011 23  2054 23  1435 23  1113   WBC 10.04 12.83*  --   --  17.97*   HEMOGLOBIN 12.6* 13.5  --   --  16.1   HEMATOCRIT 38.0 39.8  --   --  46.8   PLATELETS 168 178  --   --  224   NEUTROS ABS  --  9.82*  --   --  15.56*   IMMATURE GRANS (ABS)  --  0.13*  --   --  0.26*   LYMPHS ABS  --  1.64  --   --  0.81   MONOS ABS  --  1.18*  --   --  1.19*   EOS ABS  --  0.01  --   --  0.12   MCV 92.9 91.9  --   --  90.3    PROCALCITONIN  --   --   --   --  0.45*   LACTATE  --   --  1.3 2.2* 2.3*         Lab 01/06/23  0615 01/05/23  1011 01/04/23  2054 01/04/23  1113   SODIUM 137 134*  --  136   POTASSIUM 3.5 3.6 2.8* 2.8*   CHLORIDE 101 98  --  97*   CO2 26.0 27.0  --  22.0   ANION GAP 10.0 9.0  --  17.0*   BUN 19 20  --  35*   CREATININE 0.76 0.82  --  1.18   EGFR 94.9 92.8  --  65.2   GLUCOSE 90 115*  --  138*   CALCIUM 8.5* 8.2*  --  8.8   MAGNESIUM  --  2.0 2.4 1.4*   PHOSPHORUS 3.4 2.2* 2.6  --    TSH  --   --   --  3.730         Lab 01/06/23 0615 01/05/23 1011 01/04/23  1113   TOTAL PROTEIN 5.5* 5.7* 7.2   ALBUMIN 3.0* 3.1* 3.4*   GLOBULIN 2.5 2.6 3.8   ALT (SGPT) 28 27 17   AST (SGOT) 52* 62* 40   BILIRUBIN 0.5 0.7 1.3*   ALK PHOS 66 73 73         Lab 01/05/23 1011 01/04/23 2054 01/04/23  1113   PROBNP 1,435.0*  --   --    TROPONIN T  --  0.010 <0.010     Brief Urine Lab Results  (Last result in the past 365 days)      Color   Clarity   Blood   Leuk Est   Nitrite   Protein   CREAT   Urine HCG        01/04/23 1200 Dark Yellow   Clear   Negative   Negative   Negative   30 mg/dL (1+)               Microbiology Results Abnormal     Procedure Component Value - Date/Time    Blood Culture - Blood, Arm, Right [[id number]]  (Normal) Collected: 01/04/23 1115    Lab Status: Preliminary result Specimen: Blood from Arm, Right Updated: 01/06/23 1145     Blood Culture No growth at 2 days    Blood Culture - Blood, Hand, Left [[id number]]  (Normal) Collected: 01/04/23 1120    Lab Status: Preliminary result Specimen: Blood from Hand, Left Updated: 01/06/23 1145     Blood Culture No growth at 2 days    Legionella Antigen, Urine - Urine, Urine, Clean Catch [[id number]]  (Normal) Collected: 01/04/23 2333    Lab Status: Final result Specimen: Urine, Clean Catch Updated: 01/05/23 1048     LEGIONELLA ANTIGEN, URINE Negative    S. Pneumo Ag Urine or CSF - Urine, Urine, Clean Catch [[id number]]  (Normal) Collected: 01/04/23 2333    Lab Status: Final  result Specimen: Urine, Clean Catch Updated: 01/05/23 1048     Strep Pneumo Ag Negative    MRSA Screen, PCR (Inpatient) - Swab, Nares [[id number]]  (Normal) Collected: 01/04/23 2125    Lab Status: Final result Specimen: Swab from Nares Updated: 01/05/23 0955     MRSA PCR Negative    Narrative:      The negative predictive value of this diagnostic test is high and should only be used to consider de-escalating anti-MRSA therapy. A positive result may indicate colonization with MRSA and must be correlated clinically.  MRSA Negative    COVID PRE-OP / PRE-PROCEDURE SCREENING ORDER (NO ISOLATION) - Swab, Nasopharynx [[id number]]  (Normal) Collected: 01/04/23 1118    Lab Status: Final result Specimen: Swab from Nasopharynx Updated: 01/04/23 1212    Narrative:      The following orders were created for panel order COVID PRE-OP / PRE-PROCEDURE SCREENING ORDER (NO ISOLATION) - Swab, Nasopharynx.  Procedure                               Abnormality         Status                     ---------                               -----------         ------                     COVID-19, FLU A/B, RSV P...[[id number]]  Normal              Final result                 Please view results for these tests on the individual orders.    COVID-19, FLU A/B, RSV PCR - Swab, Nasopharynx [[id number]]  (Normal) Collected: 01/04/23 1118    Lab Status: Final result Specimen: Swab from Nasopharynx Updated: 01/04/23 1212     COVID19 Not Detected     Influenza A PCR Not Detected     Influenza B PCR Not Detected     RSV, PCR Not Detected    Narrative:      Fact sheet for providers: https://www.fda.gov/media/486121/download    Fact sheet for patients: https://www.fda.gov/media/010257/download    Test performed by PCR.        Adult Transthoracic Echo Complete W/ Cont if Necessary Per Protocol    Result Date: 1/5/2023  •  Left ventricular systolic function is normal. Left ventricular ejection fraction appears to be 56 - 60%. •  Left ventricular wall thickness is  consistent with mild to moderate concentric hypertrophy. •  Mildly reduced right ventricular systolic function noted. •  Left atrial volume is moderately increased. •  The right atrial cavity is mildly dilated. •  Mild to moderate aortic valve regurgitation is present. •  Moderate mitral valve regurgitation is present. •  Mild tricuspid valve regurgitation is present. •  Estimated right ventricular systolic pressure from tricuspid regurgitation is mildly elevated (35-45 mmHg). •  Incidental finding of a large anechoic structure adjacent or within the liver, measuring 21.0 x 12.8 cm.     CT Head Without Contrast    Result Date: 1/4/2023  CT HEAD WO CONTRAST Date of Exam: 1/4/2023 2:47 PM EST Indication: ams. Comparison: None available. Technique: Axial CT images were obtained of the head without contrast administration.  Reconstructed coronal and sagittal images were also obtained. Automated exposure control and iterative construction methods were used. FINDINGS: There is no evidence for acute intracranial hemorrhage. No definitive acute focal ischemia is identified. Nonspecific diffuse white matter changes are noted likely related to chronic small vessel ischemic changes and age-related changes. Associated diffuse volume loss is observed. A centralized pattern of volume loss is noted. There is therefore a prominent appearance of the ventricles felt to be related to volume loss. Communicating hydrocephalus is felt less likely. There is no evidence for abnormal cerebral edema. There is no mass effect or midline shift. The basal cisterns are patent. The skull is intact. The paranasal sinuses and mastoid air cells are clear.     Impression: 1.No evidence for acute intracranial abnormality. 2.Diffuse nonspecific white matter changes are noted with associated diffuse volume loss. These findings are likely related to chronic small vessel ischemic changes and/or age-related changes. 3.There appears to be a centralized  pattern of volume loss which results in a prominent appearance of the ventricular system. Changes secondary to communicating hydrocephalus are felt less likely. Electronically Signed: Umer Kc  1/4/2023 3:07 PM EST  Workstation ID: [id number]    Results for orders placed during the hospital encounter of 01/04/23    Adult Transthoracic Echo Complete W/ Cont if Necessary Per Protocol    Interpretation Summary  •  Left ventricular systolic function is normal. Left ventricular ejection fraction appears to be 56 - 60%.  •  Left ventricular wall thickness is consistent with mild to moderate concentric hypertrophy.  •  Mildly reduced right ventricular systolic function noted.  •  Left atrial volume is moderately increased.  •  The right atrial cavity is mildly dilated.  •  Mild to moderate aortic valve regurgitation is present.  •  Moderate mitral valve regurgitation is present.  •  Mild tricuspid valve regurgitation is present.  •  Estimated right ventricular systolic pressure from tricuspid regurgitation is mildly elevated (35-45 mmHg).  •  Incidental finding of a large anechoic structure adjacent or within the liver, measuring 21.0 x 12.8 cm.    I have reviewed the medications:  Scheduled Meds:aspirin, 81 mg, Oral, Daily  cefTRIAXone, 1 g, Intravenous, Q24H  doxycycline, 100 mg, Oral, Q12H  enoxaparin, 40 mg, Subcutaneous, Daily  thiamine, 100 mg, Oral, Daily   And  multivitamin, 1 tablet, Oral, Daily   And  folic acid, 1 mg, Oral, Daily  lidocaine, 1 patch, Transdermal, Q24H  miconazole, , Topical, Q12H  senna-docusate sodium, 2 tablet, Oral, BID  sodium chloride, 10 mL, Intravenous, Q12H      Continuous Infusions:   PRN Meds:.•  acetaminophen  •  senna-docusate sodium **AND** polyethylene glycol **AND** bisacodyl **AND** bisacodyl  •  doxylamine  •  guaifenesin  •  magnesium sulfate **OR** magnesium sulfate **OR** magnesium sulfate  •  melatonin  •  potassium & sodium phosphates **OR** potassium & sodium  phosphates  •  potassium chloride **OR** potassium chloride **OR** potassium chloride  •  sodium chloride  •  sodium chloride  •  sodium chloride    Assessment & Plan   Assessment & Plan     Active Hospital Problems    Diagnosis  POA   • **Acute respiratory failure with hypoxia (HCC) [J96.01]  Yes   • CAP (community acquired pneumonia) [J18.9]  Yes   • Sepsis, unspecified organism (HCC) [A41.9]  Yes   • Moderate dehydration [E86.0]  Yes   • Hypokalemia [E87.6]  Yes   • Hypomagnesemia [E83.42]  Yes   • Physical deconditioning [R53.81]  Unknown      Resolved Hospital Problems   No resolved problems to display.     Brief Hospital Course to date:  Olu Henriquez is a 72 y.o. male with PMH significant for HTN, essential tremor and glaucoma. He has slowly lost his sight over the past few months and is now legally blind. He has fallen multiple times at home due to the blindness. Admitted for sepsis / acute hypoxic respiratory failure secondary to LML / BAILEE community acquired pneumonia. Cardiology consulted for atrial flutter and concerns for tachy-gale syndrome. ECHO incidentally revealed a large, anechoic structure adjacent or within the liver measuring 21 x 12.8cm.     Sepsis secondary to BAILEE / LML pneumonia  Acute respiratory failure with hypoxia  - 88% on room air in ED, required 2L to maintain O2 saturations  - Met sepsis criteria on arrival with , RR 20, WBCs 17.9, lactic acid 2.3  - CTA chest negative for PE but did show evidence of BAILEE and LML pneumonia with mediastinal lymphadenopathy  - Continue Ceftriaxone and Doxycycline  - Encourage IS   - Wean O2 as able     Atrial flutter with RVR  Concern for tachy-gale syndrome   - Appreciate cardiology assistance. 's on admission   - Improved with treatment of pneumonia   - Started on propanolol but has developed bradycardia and pauses up to > 4 seconds  - Cardiology to discuss consideration of PPM placement with EP service. BB stopped   - Not a candidate  for anticoagulation due to frequent falls   - ECHO showed LVEF 56-60% with mild to moderate concentric hypertrophy, mildly reduced RV systolic function, mild to moderate AVR, moderate MVR, mild TVR, RVSP mildly elevated at 35-45mmHg     Anechoic liver lesion  - ECHO incidentally revealed a large, anechoic structure adjacent or within the liver measuring 21 x 12.8cm  - Liver US did not identify any liver masses or masses adjacent to the liver but did show a slightly coarsened and nodular echotexture of the liver, concerning for cirrhosis    Hypomagnesemia  Hypokalemia  - Replace per protocol     Debility / frequent falls   - PT/OT following. He is requiring assist x 2 and recommend SNF rehab  - Case management following     Expected Discharge Location and Transportation: SNF via WC van or EMS   Expected Discharge   Expected Discharge Date and Time     Expected Discharge Date Expected Discharge Time    Jose Juan 10, 2023          DVT prophylaxis:Medical DVT prophylaxis orders are present.     AM-PAC 6 Clicks Score (PT): 16 (01/06/23 0835)    CODE STATUS:   Code Status and Medical Interventions:   Ordered at: 01/04/23 1452     Level Of Support Discussed With:    Patient     Code Status (Patient has no pulse and is not breathing):    CPR (Attempt to Resuscitate)     Medical Interventions (Patient has pulse or is breathing):    Full Support     Leonora Forrest PA-C  01/06/23

## 2023-01-06 NOTE — PLAN OF CARE
Goal Outcome Evaluation:               Aox4. Pt HR 60-80s while awake. Pt HR drops into the low 40s while asleep and returns back to the 50s/60s. Pt asymptomatic. BP in 120s systolic this AM. 2L NC. No complaints of of pain. IV Abx administered. Ultrasound planned for this AM. Pt currently NPO.

## 2023-01-06 NOTE — PROGRESS NOTES
[facility]  IP Progress Note      Chief Complaint/Reason for service: #1 atrial flutter #2 tachybradycardia syndrome    Subjective   Subjective: The patient denies chest pain or shortness of breath.  He has chronic pain syndrome.  No nausea or vomiting.    Past medical, surgical, social and family history reviewed in the patient's electronic medical record.    Objective     Vital Sign Min/Max for last 24 hours  Temp  Min: 97.8 °F (36.6 °C)  Max: 99.5 °F (37.5 °C)   BP  Min: 122/76  Max: 159/93   Pulse  Min: 33  Max: 80   Resp  Min: 18  Max: 18   SpO2  Min: 90 %  Max: 95 %   Flow (L/min)  Min: 2  Max: 2      Intake/Output Summary (Last 24 hours) at 1/6/2023 0649  Last data filed at 1/6/2023 0554  Gross per 24 hour   Intake 540 ml   Output 600 ml   Net -60 ml             Current Facility-Administered Medications:   •  acetaminophen (TYLENOL) tablet 650 mg, 650 mg, Oral, Q4H PRN, Concepcion Mccarthy MD, 650 mg at 01/05/23 2151  •  aspirin EC tablet 81 mg, 81 mg, Oral, Daily, Kenton Hartmann MD, 81 mg at 01/05/23 1345  •  sennosides-docusate (PERICOLACE) 8.6-50 MG per tablet 2 tablet, 2 tablet, Oral, BID, 2 tablet at 01/05/23 2144 **AND** polyethylene glycol (MIRALAX) packet 17 g, 17 g, Oral, Daily PRN **AND** bisacodyl (DULCOLAX) EC tablet 5 mg, 5 mg, Oral, Daily PRN **AND** bisacodyl (DULCOLAX) suppository 10 mg, 10 mg, Rectal, Daily PRN, Concepcion Mccarthy MD  •  cefTRIAXone (ROCEPHIN) 1 g/100 mL 0.9% NS (MBP), 1 g, Intravenous, Q24H, Concepcion Mccarthy MD, 1 g at 01/05/23 2317  •  doxycycline (MONODOX) capsule 100 mg, 100 mg, Oral, Q12H, Concepcion Mccarthy MD, 100 mg at 01/05/23 2144  •  doxylamine (UNISOM) tablet 25 mg, 25 mg, Oral, Nightly PRN, Kenton Hartmann MD, 25 mg at 01/06/23 0207  •  Enoxaparin Sodium (LOVENOX) syringe 40 mg, 40 mg, Subcutaneous, Daily, Concepcion Mccarthy MD, 40 mg at 01/05/23 0831  •  thiamine (VITAMIN B-1) tablet 100 mg, 100 mg, Oral, Daily, 100 mg at 01/05/23 0831 **AND**  multivitamin (THERAGRAN) tablet 1 tablet, 1 tablet, Oral, Daily, 1 tablet at 01/05/23 0831 **AND** folic acid (FOLVITE) tablet 1 mg, 1 mg, Oral, Daily, Concepcion Mccarthy MD, 1 mg at 01/05/23 0831  •  guaifenesin (ROBITUSSIN) 100 MG/5ML liquid 200 mg, 200 mg, Oral, Q6H PRN, Kenton Hartmann MD, 200 mg at 01/05/23 2144  •  lidocaine (LIDODERM) 5 % 1 patch, 1 patch, Transdermal, Q24H, Floyd Oquendo PA-C, 1 patch at 01/05/23 0014  •  Magnesium Sulfate 2 gram Bolus, followed by 8 gram infusion (total Mg dose 10 grams)- Mg less than or equal to 1mg/dL, 2 g, Intravenous, PRN **OR** Magnesium Sulfate 2 gram / 50mL Infusion (GIVE X 3 BAGS TO EQUAL 6GM TOTAL DOSE) - Mg 1.1 - 1.5 mg/dl, 2 g, Intravenous, PRN, Last Rate: 25 mL/hr at 01/04/23 2119, 2 g at 01/04/23 2119 **OR** Magnesium Sulfate 4 gram infusion- Mg 1.6-1.9 mg/dL, 4 g, Intravenous, PRN, Concepcion Mccarthy MD  •  melatonin tablet 5 mg, 5 mg, Oral, Nightly PRN, Floyd Oquendo PA-C, 5 mg at 01/05/23 0014  •  miconazole (MICOTIN) 2 % powder, , Topical, Q12H, Kenton Hartmann MD, Given at 01/05/23 2145  •  potassium & sodium phosphates (PHOS-NAK) 280-160-250 MG packet - for Phosphorus less than 1.25 mg/dL, 2 packet, Oral, Q6H PRN **OR** potassium & sodium phosphates (PHOS-NAK) 280-160-250 MG packet - for Phosphorus 1.25 - 2.5 mg/dL, 2 packet, Oral, Q6H PRN, Concepcion Mccarthy MD  •  potassium chloride (MICRO-K) CR capsule 40 mEq, 40 mEq, Oral, PRN, 40 mEq at 01/05/23 0041 **OR** potassium chloride (KLOR-CON) packet 40 mEq, 40 mEq, Oral, PRN **OR** potassium chloride 10 mEq in 100 mL IVPB, 10 mEq, Intravenous, Q1H PRN, Concepcion Mccarthy MD  •  propranolol (INDERAL) tablet 20 mg, 20 mg, Oral, Daily, Glenn Cruz MD  •  sodium chloride 0.9 % flush 10 mL, 10 mL, Intravenous, PRN, Concepcion Mccarthy MD  •  sodium chloride 0.9 % flush 10 mL, 10 mL, Intravenous, Q12H, Concepcion Mccarthy MD, 10 mL at 01/05/23 2145  •  sodium chloride 0.9 % flush 10 mL, 10 mL, Intravenous, PRN,  Concepcion Mccarthy MD  •  sodium chloride 0.9 % infusion 40 mL, 40 mL, Intravenous, PRN, Concepcion Mccarthy MD    Physical Exam: General well-developed pleasant male not dyspneic not tachypneic current heart rate 68        HEENT: No JVP.  No icterus       Respiratory: Equal bilateral symmetrical expansion.  Lungs clear on the right few crackles on the left       Cardiovascular: Irregular rate and rhythm with a soft systolic murmur and no edema to palpation       GI: Soft and flat       Lower Extremities: No lesions       Neuro: Facial expressions are symmetrical moves all 4 extremities       Skin: Warm and dry no edema to palpation       Psych: Pleasant affect    Results Review: Patient is a net +1.1 L since admission.  Heart rates in the 40s to 70s.    Radiology Results:  Imaging Results (Last 72 Hours)     Procedure Component Value Units Date/Time    CT Head Without Contrast [[id number]] Collected: 01/04/23 1505     Updated: 01/04/23 1509    Narrative:      CT HEAD WO CONTRAST    Date of Exam: 1/4/2023 2:47 PM EST    Indication: ams.    Comparison: None available.    Technique: Axial CT images were obtained of the head without contrast administration.  Reconstructed coronal and sagittal images were also obtained. Automated exposure control and iterative construction methods were used.    FINDINGS:  There is no evidence for acute intracranial hemorrhage. No definitive acute focal ischemia is identified. Nonspecific diffuse white matter changes are noted likely related to chronic small vessel ischemic changes and age-related changes. Associated   diffuse volume loss is observed. A centralized pattern of volume loss is noted. There is therefore a prominent appearance of the ventricles felt to be related to volume loss. Communicating hydrocephalus is felt less likely. There is no evidence for   abnormal cerebral edema. There is no mass effect or midline shift. The basal cisterns are patent. The skull is intact. The paranasal  sinuses and mastoid air cells are clear.      Impression:      1.No evidence for acute intracranial abnormality.  2.Diffuse nonspecific white matter changes are noted with associated diffuse volume loss. These findings are likely related to chronic small vessel ischemic changes and/or age-related changes.  3.There appears to be a centralized pattern of volume loss which results in a prominent appearance of the ventricular system. Changes secondary to communicating hydrocephalus are felt less likely.    Electronically Signed: Umer Saleemlett    1/4/2023 3:07 PM EST    Workstation ID: [id number]    CT Angiogram Chest [[id number]] Collected: 01/04/23 1325     Updated: 01/04/23 1451    Narrative:      CT ANGIOGRAM CHEST    Date of Exam: 1/4/2023 1:00 PM EST    Indication: pe.    Comparison: None available.    Technique: CTA of the chest was performed after the uneventful intravenous administration of 75 mL Isovue-370. Reconstructed coronal and sagittal images were also obtained. In addition, a 3-D volume rendered image was created for interpretation.   Automated exposure control and iterative reconstruction methods were used.     Findings:  There is no pathologic axillary adenopathy or other worrisome body wall soft tissue finding in the chest. No acute findings are present in the partially characterized upper abdomen. There is no pleural or pericardial effusion. Mildly prominent, favored   reactive mediastinal lymph nodes are present, for example an AP window lymph node measuring 11 mm short axis. Mildly atherosclerotic, nonaneurysmal thoracic aorta. The pulmonary arteries are well-opacified and without evidence of focal filling defect   concerning for acute pulmonary embolus. Evaluation of the osseous structures demonstrates no evidence of acute fracture or aggressive osseous lesion, with multilevel thoracic spondylosis change present. Evaluation of the lung fields demonstrates areas of   peribronchial consolidation and  groundglass opacity throughout the left upper and left lower lobes with most confluent consolidation present in the left lower lobe        Impression:      Impression:  No evidence of pulmonary embolus. Findings of left upper and left lower lobe pneumonia as above. Likely reactive borderline enlarged mediastinal lymph nodes are present.    Electronically Signed: Zaki Minor    1/4/2023 1:32 PM EST    Workstation ID: [id number]    XR Chest 1 View [[id number]] Collected: 01/04/23 1057     Updated: 01/04/23 1451    Narrative:      XR CHEST 1 VW    Date of Exam: 1/4/2023 10:46 AM EST    Indication: Weakness, dizziness, altered mental status.    Comparison: 1/26/2015.    Findings:  There is elevation of the left hemidiaphragm. There is abnormal consolidation in the left lung, particularly in the left lung base, felt to represent pneumonia. There is a questionable left basilar pleural effusion. The right lung and pleural space are   clear. The heart is borderline enlarged. The pulmonary vascular markings are normal. There is a mild dextroscoliosis of the thoracolumbar spine. There are underlying degenerative changes.      Impression:      Impression:    1. Elevation of the left hemidiaphragm.  2. Abnormal consolidation in the left lung consistent with pneumonia.  3. Questionable small left basilar pleural effusion.    Electronically Signed: Reza Sarah    1/4/2023 10:59 AM EST    Workstation ID: [id number]          EKG: Atrial flutter    ECHO: EF 56 to 60% with mild to moderate AI and mild MR    Tele: Patient having pauses up to 4.2 seconds    Assessment   Assessment/Plan: #1 atrial flutter-the patient has fallen numerous times so he does not appear to be a candidate for anticoagulation  2 tachybradycardia syndrome-patient is having pauses up to greater than 4 seconds.  We will need to consider a pacemaker insertion.  I will DC his propranolol and I will make him n.p.o. till I discussed the case with our EP service.    Glenn  Yan Cruz MD  01/06/23  06:49 EST   Hold at the patient go ahead and eat.  There are no EP doctors available in the hospital today.

## 2023-01-06 NOTE — PROGRESS NOTES
"                    Clinical Nutrition     Patient Name: Olu Henriquez  YOB: 1950  MRN: [id number]  Date of Encounter: 23 12:26 EST  Admission date: 2023    Reason for Visit   Identified at risk by screening criteria, Reduced oral intake, unintetentional loss of 10# or more within 2 months    EMR Reviewed: Yes     Diet Nutrition Related History:      Pt admitted d/t acute respiratory failure with hypoxia. Pt reports eating well PTA and having a good appetite. Pt is blind and has to have people bring food to him. Pt reports difficulty eating d/t being blind and does better with finger foods. Pt states he has not been weighed in a few years but estimates -174#. Unable to determine wt change d/t no recent EMR hx. Pt reports having missing teeth but not difficulty chewing/swallowing. NKFA. Sent diet message to kitchen on pt's preferences. Ordered assistance with eating d/t pt stating he needs help when eating. Pt declined ONS.    Anthropometrics   Height: Height: 177.8 cm (70\")  Admission Weight:   Flowsheet Rows    Flowsheet Row First Filed Value   Admission Height 177.8 cm (70\") Documented at 2023 1009   Admission Weight 77.6 kg (171 lb) Documented at 2023 1009        Last Filed Weight:Weight: 77.6 kg (171 lb 1.2 oz) (23 1049)  Weight Method: Stated  BMI: BMI (Calculated): 24.5  BMI classification: Normal: 18.5-24.9kg/m2   IBW: Ideal Body Weight (IBW) (kg): 76.48  EMR wts:  Weight Weight (kg) Weight (lbs) Weight Method VISIT REPORT   2023 77.6 kg 171 lb 1.2 oz - -   2023 77.565 kg 171 lb Stated -   2020 87.363 kg 192 lb 9.6 oz - Report   2019 74.844 kg 165 lb - Report      Current Nutrition Prescription     NPO Diet NPO Type: Sips with Meds    Average Intake from [place]% x 2 meals, 100% x 1 snack    Intake History:     Intake Category  N/A    Actions   Appropriateness for MSA:  Criteria for further malnutrition exam not met at this time. " Follow per protocol.     Interventions:   Follow treatment progress, Care plan reviewed, Interview for preferences, Await begin PO, Supplement offered/refused    Dolores Elliott,   Time Spent: 20 min

## 2023-01-07 LAB
ANION GAP SERPL CALCULATED.3IONS-SCNC: 11 MMOL/L (ref 5–15)
BUN SERPL-MCNC: 13 MG/DL (ref 8–23)
BUN/CREAT SERPL: 16.5 (ref 7–25)
CALCIUM SPEC-SCNC: 8.2 MG/DL (ref 8.6–10.5)
CHLORIDE SERPL-SCNC: 98 MMOL/L (ref 98–107)
CO2 SERPL-SCNC: 27 MMOL/L (ref 22–29)
CREAT SERPL-MCNC: 0.79 MG/DL (ref 0.76–1.27)
EGFRCR SERPLBLD CKD-EPI 2021: 93.8 ML/MIN/1.73
GLUCOSE SERPL-MCNC: 147 MG/DL (ref 65–99)
MAGNESIUM SERPL-MCNC: 1.5 MG/DL (ref 1.6–2.4)
POTASSIUM SERPL-SCNC: 3.4 MMOL/L (ref 3.5–5.2)
SODIUM SERPL-SCNC: 136 MMOL/L (ref 136–145)

## 2023-01-07 PROCEDURE — 93005 ELECTROCARDIOGRAM TRACING: CPT | Performed by: PHYSICIAN ASSISTANT

## 2023-01-07 PROCEDURE — 94799 UNLISTED PULMONARY SVC/PX: CPT

## 2023-01-07 PROCEDURE — 80048 BASIC METABOLIC PNL TOTAL CA: CPT | Performed by: PHYSICIAN ASSISTANT

## 2023-01-07 PROCEDURE — 94761 N-INVAS EAR/PLS OXIMETRY MLT: CPT

## 2023-01-07 PROCEDURE — 25010000002 ENOXAPARIN PER 10 MG: Performed by: STUDENT IN AN ORGANIZED HEALTH CARE EDUCATION/TRAINING PROGRAM

## 2023-01-07 PROCEDURE — 25010000002 DIGOXIN PER 500 MCG: Performed by: PHYSICIAN ASSISTANT

## 2023-01-07 PROCEDURE — 25010000002 HYDRALAZINE PER 20 MG: Performed by: NURSE PRACTITIONER

## 2023-01-07 PROCEDURE — 83735 ASSAY OF MAGNESIUM: CPT | Performed by: PHYSICIAN ASSISTANT

## 2023-01-07 PROCEDURE — 93010 ELECTROCARDIOGRAM REPORT: CPT | Performed by: INTERNAL MEDICINE

## 2023-01-07 PROCEDURE — 99232 SBSQ HOSP IP/OBS MODERATE 35: CPT | Performed by: HOSPITALIST

## 2023-01-07 RX ORDER — HYDRALAZINE HYDROCHLORIDE 25 MG/1
25 TABLET, FILM COATED ORAL ONCE
Status: COMPLETED | OUTPATIENT
Start: 2023-01-07 | End: 2023-01-07

## 2023-01-07 RX ORDER — HYDRALAZINE HYDROCHLORIDE 20 MG/ML
10 INJECTION INTRAMUSCULAR; INTRAVENOUS ONCE
Status: COMPLETED | OUTPATIENT
Start: 2023-01-07 | End: 2023-01-07

## 2023-01-07 RX ORDER — DIGOXIN 0.25 MG/ML
250 INJECTION INTRAMUSCULAR; INTRAVENOUS ONCE
Status: COMPLETED | OUTPATIENT
Start: 2023-01-07 | End: 2023-01-07

## 2023-01-07 RX ORDER — SODIUM CHLORIDE FOR INHALATION 7 %
4 VIAL, NEBULIZER (ML) INHALATION ONCE
Status: COMPLETED | OUTPATIENT
Start: 2023-01-07 | End: 2023-01-07

## 2023-01-07 RX ADMIN — DIGOXIN 250 MCG: 0.25 INJECTION INTRAMUSCULAR; INTRAVENOUS at 08:54

## 2023-01-07 RX ADMIN — SODIUM CHLORIDE 4 ML: 7 NEBU SOLN,3 % NEBU at 11:52

## 2023-01-07 RX ADMIN — METOPROLOL TARTRATE 25 MG: 25 TABLET, FILM COATED ORAL at 11:49

## 2023-01-07 RX ADMIN — HYDRALAZINE HYDROCHLORIDE 10 MG: 20 INJECTION INTRAMUSCULAR; INTRAVENOUS at 03:59

## 2023-01-07 RX ADMIN — DOXYCYCLINE 100 MG: 100 CAPSULE ORAL at 22:00

## 2023-01-07 RX ADMIN — Medication 10 ML: at 22:00

## 2023-01-07 RX ADMIN — HYDRALAZINE HYDROCHLORIDE 25 MG: 25 TABLET, FILM COATED ORAL at 17:00

## 2023-01-07 RX ADMIN — METOPROLOL TARTRATE 25 MG: 25 TABLET, FILM COATED ORAL at 22:00

## 2023-01-07 RX ADMIN — Medication 10 ML: at 08:59

## 2023-01-07 RX ADMIN — METOPROLOL TARTRATE 25 MG: 25 TABLET, FILM COATED ORAL at 15:49

## 2023-01-07 RX ADMIN — MICONAZOLE NITRATE: 20 POWDER TOPICAL at 08:59

## 2023-01-07 RX ADMIN — MICONAZOLE NITRATE: 20 POWDER TOPICAL at 22:01

## 2023-01-07 RX ADMIN — ENOXAPARIN SODIUM 40 MG: 40 INJECTION SUBCUTANEOUS at 08:57

## 2023-01-07 RX ADMIN — MULTIVITAMIN TABLET 1 TABLET: TABLET at 08:58

## 2023-01-07 RX ADMIN — FOLIC ACID 1 MG: 1 TABLET ORAL at 08:57

## 2023-01-07 RX ADMIN — THIAMINE HCL TAB 100 MG 100 MG: 100 TAB at 08:58

## 2023-01-07 RX ADMIN — DOXYCYCLINE 100 MG: 100 CAPSULE ORAL at 08:58

## 2023-01-07 RX ADMIN — LIDOCAINE 1 PATCH: 50 PATCH CUTANEOUS at 08:58

## 2023-01-07 RX ADMIN — ASPIRIN 81 MG: 81 TABLET, COATED ORAL at 08:58

## 2023-01-07 NOTE — PROGRESS NOTES
[facility] Medicine Services  PROGRESS NOTE    Patient Name: Olu Henriquez  : 1950  MRN: [id number]    Date of Admission: 2023  Primary Care Physician: Jesse De Anda MD    Subjective     CC: f/u PNA     HPI: In bed. Anxious. Dry cough, difficulty mobilizing sputum. No f/c. No n/v. Tired.    Review of Systems   Constitutional: Positive for activity change, appetite change and fatigue.   HENT: Positive for congestion.    Respiratory: Positive for cough. Negative for shortness of breath.    Cardiovascular: Negative for chest pain, palpitations and leg swelling.   Gastrointestinal: Negative.    Genitourinary: Negative.    Neurological: Negative.    Psychiatric/Behavioral: Negative.        Objective     Vital Signs:   Temp:  [97.5 °F (36.4 °C)-99.7 °F (37.6 °C)] 99.5 °F (37.5 °C)  Heart Rate:  [] 120  Resp:  [16-18] 17  BP: (147-176)/() 149/100  Flow (L/min):  [2] 2     Physical Exam:  NAD, alert and oriented  OP clear, MMM  Neck supple  No LAD  Tachy  Decreased at bases, coarse on L  +BS, ND, NT, soft  SOTELO  Normal affect  No rashes  Results Reviewed:  LAB RESULTS:      Lab 23  0615 23  1011 23  2054 23  1435 23  1113   WBC 10.04 12.83*  --   --  17.97*   HEMOGLOBIN 12.6* 13.5  --   --  16.1   HEMATOCRIT 38.0 39.8  --   --  46.8   PLATELETS 168 178  --   --  224   NEUTROS ABS  --  9.82*  --   --  15.56*   IMMATURE GRANS (ABS)  --  0.13*  --   --  0.26*   LYMPHS ABS  --  1.64  --   --  0.81   MONOS ABS  --  1.18*  --   --  1.19*   EOS ABS  --  0.01  --   --  0.12   MCV 92.9 91.9  --   --  90.3   PROCALCITONIN  --   --   --   --  0.45*   LACTATE  --   --  1.3 2.2* 2.3*         Lab 23  0951 23  0615 23  1011 23  1113   SODIUM 136 137 134*  --  136   POTASSIUM 3.4* 3.5 3.6 2.8* 2.8*   CHLORIDE 98 101 98  --  97*   CO2 27.0 26.0 27.0  --  22.0   ANION GAP 11.0 10.0 9.0  --  17.0*   BUN 13 19 20  --   35*   CREATININE 0.79 0.76 0.82  --  1.18   EGFR 93.8 94.9 92.8  --  65.2   GLUCOSE 147* 90 115*  --  138*   CALCIUM 8.2* 8.5* 8.2*  --  8.8   MAGNESIUM 1.5*  --  2.0 2.4 1.4*   PHOSPHORUS  --  3.4 2.2* 2.6  --    TSH  --   --   --   --  3.730         Lab 01/06/23  0615 01/05/23  1011 01/04/23  1113   TOTAL PROTEIN 5.5* 5.7* 7.2   ALBUMIN 3.0* 3.1* 3.4*   GLOBULIN 2.5 2.6 3.8   ALT (SGPT) 28 27 17   AST (SGOT) 52* 62* 40   BILIRUBIN 0.5 0.7 1.3*   ALK PHOS 66 73 73         Lab 01/05/23  1011 01/04/23 2054 01/04/23  1113   PROBNP 1,435.0*  --   --    TROPONIN T  --  0.010 <0.010     Brief Urine Lab Results  (Last result in the past 365 days)      Color   Clarity   Blood   Leuk Est   Nitrite   Protein   CREAT   Urine HCG        01/04/23 1200 Dark Yellow   Clear   Negative   Negative   Negative   30 mg/dL (1+)               Microbiology Results Abnormal     Procedure Component Value - Date/Time    Blood Culture - Blood, Arm, Right [[id number]]  (Normal) Collected: 01/04/23 1115    Lab Status: Preliminary result Specimen: Blood from Arm, Right Updated: 01/06/23 1145     Blood Culture No growth at 2 days    Blood Culture - Blood, Hand, Left [[id number]]  (Normal) Collected: 01/04/23 1120    Lab Status: Preliminary result Specimen: Blood from Hand, Left Updated: 01/06/23 1145     Blood Culture No growth at 2 days    Legionella Antigen, Urine - Urine, Urine, Clean Catch [[id number]]  (Normal) Collected: 01/04/23 2333    Lab Status: Final result Specimen: Urine, Clean Catch Updated: 01/05/23 1048     LEGIONELLA ANTIGEN, URINE Negative    S. Pneumo Ag Urine or CSF - Urine, Urine, Clean Catch [[id number]]  (Normal) Collected: 01/04/23 2333    Lab Status: Final result Specimen: Urine, Clean Catch Updated: 01/05/23 1048     Strep Pneumo Ag Negative    MRSA Screen, PCR (Inpatient) - Swab, Nares [[id number]]  (Normal) Collected: 01/04/23 2125    Lab Status: Final result Specimen: Swab from Nares Updated: 01/05/23 0955     MRSA PCR  Negative    Narrative:      The negative predictive value of this diagnostic test is high and should only be used to consider de-escalating anti-MRSA therapy. A positive result may indicate colonization with MRSA and must be correlated clinically.  MRSA Negative    COVID PRE-OP / PRE-PROCEDURE SCREENING ORDER (NO ISOLATION) - Swab, Nasopharynx [[id number]]  (Normal) Collected: 01/04/23 1118    Lab Status: Final result Specimen: Swab from Nasopharynx Updated: 01/04/23 1212    Narrative:      The following orders were created for panel order COVID PRE-OP / PRE-PROCEDURE SCREENING ORDER (NO ISOLATION) - Swab, Nasopharynx.  Procedure                               Abnormality         Status                     ---------                               -----------         ------                     COVID-19, FLU A/B, RSV P...[[id number]]  Normal              Final result                 Please view results for these tests on the individual orders.    COVID-19, FLU A/B, RSV PCR - Swab, Nasopharynx [[id number]]  (Normal) Collected: 01/04/23 1118    Lab Status: Final result Specimen: Swab from Nasopharynx Updated: 01/04/23 1212     COVID19 Not Detected     Influenza A PCR Not Detected     Influenza B PCR Not Detected     RSV, PCR Not Detected    Narrative:      Fact sheet for providers: https://www.fda.gov/media/361728/download    Fact sheet for patients: https://www.fda.gov/media/407980/download    Test performed by PCR.        Adult Transthoracic Echo Complete W/ Cont if Necessary Per Protocol    Result Date: 1/5/2023  •  Left ventricular systolic function is normal. Left ventricular ejection fraction appears to be 56 - 60%. •  Left ventricular wall thickness is consistent with mild to moderate concentric hypertrophy. •  Mildly reduced right ventricular systolic function noted. •  Left atrial volume is moderately increased. •  The right atrial cavity is mildly dilated. •  Mild to moderate aortic valve regurgitation is present.  •  Moderate mitral valve regurgitation is present. •  Mild tricuspid valve regurgitation is present. •  Estimated right ventricular systolic pressure from tricuspid regurgitation is mildly elevated (35-45 mmHg). •  Incidental finding of a large anechoic structure adjacent or within the liver, measuring 21.0 x 12.8 cm.     US Liver    Result Date: 1/6/2023  US LIVER Date of Exam: 1/6/2023 9:55 AM EST Indication: large anechoic structure adjacent or within the liver, measuring 21.0 x 12.8 cm.. Comparison: Chest CT scan 1/4/2023. No previous abdominal imaging Technique: Grayscale and color Doppler ultrasound evaluation of the right upper quadrant was performed. Findings: By history, the large anechoic structure noted within or adjacent to the liver was seen on echocardiogram. On today's ultrasound images, the liver has a slightly nodular contour and coarsened echotexture, potentially representing cirrhosis. No liver masses are identified and no liver cysts are seen. No cyst is seen immediately adjacent to liver. Pancreas is seen in limited fashion, but where visible appears grossly normal. Gallbladder is borderline thick walled, a 3 mm in width but this may be due to incomplete distention or possibly due to liver disease. There is trace calcification of the gallbladder wall. No gallstones or gallbladder wall debris are identified. Common duct is abnormally dilated, even allowing for the patient's age at approximately 9 mm. There is expected portal vein and hepatic vein waveform and directional flow. The right kidney appears normal and measures 11.5 cm in length. There is a trace amount of ascites adjacent to the kidney but practically no ascites elsewhere. Findings of a large cystic lesion adjacent to the liver on echocardiogram may represent patient's markedly distended bladder, today noted to be approximately 18 x 10 x 15 cm in maximal diameter. There is no obvious bladder mass or debris level. .     Impression:  Impression: 1. Coarsened liver echotexture and mildly nodular liver capsule, questionable for mild changes of cirrhosis. Otherwise unremarkable appearance of liver, with no evidence of liver cyst or mass. 2. Mildly dilated common bile duct, at approximately 9 mm. 3. Markedly distended bladder to approximately 18 cm. Electronically Signed: Albert Cejad  1/6/2023 3:11 PM EST  Workstation ID: [id number]    Results for orders placed during the hospital encounter of 01/04/23    Adult Transthoracic Echo Complete W/ Cont if Necessary Per Protocol    Interpretation Summary  •  Left ventricular systolic function is normal. Left ventricular ejection fraction appears to be 56 - 60%.  •  Left ventricular wall thickness is consistent with mild to moderate concentric hypertrophy.  •  Mildly reduced right ventricular systolic function noted.  •  Left atrial volume is moderately increased.  •  The right atrial cavity is mildly dilated.  •  Mild to moderate aortic valve regurgitation is present.  •  Moderate mitral valve regurgitation is present.  •  Mild tricuspid valve regurgitation is present.  •  Estimated right ventricular systolic pressure from tricuspid regurgitation is mildly elevated (35-45 mmHg).  •  Incidental finding of a large anechoic structure adjacent or within the liver, measuring 21.0 x 12.8 cm.    I have reviewed the medications:  Scheduled Meds:aspirin, 81 mg, Oral, Daily  cefTRIAXone, 1 g, Intravenous, Q24H  doxycycline, 100 mg, Oral, Q12H  enoxaparin, 40 mg, Subcutaneous, Daily  lidocaine, 1 patch, Transdermal, Q24H  miconazole, , Topical, Q12H  senna-docusate sodium, 2 tablet, Oral, BID  sodium chloride, 10 mL, Intravenous, Q12H  sodium chloride, 4 mL, Nebulization, Once      Continuous Infusions:   PRN Meds:.•  acetaminophen  •  senna-docusate sodium **AND** polyethylene glycol **AND** bisacodyl **AND** bisacodyl  •  doxylamine  •  guaifenesin  •  magnesium sulfate **OR** magnesium sulfate **OR** magnesium  sulfate  •  melatonin  •  potassium & sodium phosphates **OR** potassium & sodium phosphates  •  potassium chloride **OR** potassium chloride **OR** potassium chloride  •  sodium chloride  •  sodium chloride  •  sodium chloride    Assessment & Plan   Assessment & Plan     Active Hospital Problems    Diagnosis  POA   • **Acute respiratory failure with hypoxia (HCC) [J96.01]  Yes   • CAP (community acquired pneumonia) [J18.9]  Yes   • Sepsis, unspecified organism (HCC) [A41.9]  Yes   • Moderate dehydration [E86.0]  Yes   • Hypokalemia [E87.6]  Yes   • Hypomagnesemia [E83.42]  Yes   • Physical deconditioning [R53.81]  Unknown      Resolved Hospital Problems   No resolved problems to display.     Brief Hospital Course to date:  Olu Henriquez is a 72 y.o. male with PMH significant for HTN, essential tremor and glaucoma. He has slowly lost his sight over the past few months and is now legally blind. He has fallen multiple times at home due to the blindness. Admitted for sepsis / acute hypoxic respiratory failure secondary to LML / BAILEE community acquired pneumonia. Cardiology consulted for atrial flutter and concerns for tachy-gale syndrome. ECHO incidentally revealed a large, anechoic structure adjacent or within the liver measuring 21 x 12.8cm.     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 He is requiring assist x 2 and recommend SNF rehab  -Case management following     Expected Discharge Location and Transportation: SNF via [facility]  or EMS   Expected Discharge   Expected Discharge Date and Time     Expected Discharge Date Expected Discharge Time    Jose Juan 10, 2023          DVT prophylaxis:Medical DVT prophylaxis orders are present.     AM-PAC 6 Clicks Score (PT): 16 (01/06/23 6631)    CODE STATUS:   Code Status and Medical Interventions:   Ordered at: 01/04/23 1452     Level Of Support Discussed With:    Patient     Code Status (Patient has no pulse and is not breathing):    CPR (Attempt to Resuscitate)     Medical Interventions (Patient has pulse or is breathing):    Full Support     Albert Escalante MD  01/07/23

## 2023-01-07 NOTE — PROGRESS NOTES
" [facility] - Progress Note    Olu Henriquez  1950  S451/1    01/07/23, 08:18 EST      Chief Complaint: Following for Tachy Mahin, AFlutter    Subjective:   Awake in bed.  Asking questions about his heart - in rapid AFib, unaware.    Didn't sleep well.  Tired.    Review of Systems:  Pertinent positives are listed above and in physical exam.  All others have been reviewed and are negative.    aspirin, 81 mg, Oral, Daily  cefTRIAXone, 1 g, Intravenous, Q24H  doxycycline, 100 mg, Oral, Q12H  enoxaparin, 40 mg, Subcutaneous, Daily  thiamine, 100 mg, Oral, Daily   And  multivitamin, 1 tablet, Oral, Daily   And  folic acid, 1 mg, Oral, Daily  lidocaine, 1 patch, Transdermal, Q24H  miconazole, , Topical, Q12H  senna-docusate sodium, 2 tablet, Oral, BID  sodium chloride, 10 mL, Intravenous, Q12H        Objective:  Vitals:   height is 177.8 cm (70\") and weight is 77.6 kg (171 lb 1.2 oz). His oral temperature is 99.5 °F (37.5 °C). His blood pressure is 167/96 and his pulse is 71. His respiration is 17 and oxygen saturation is 91%.       Intake/Output Summary (Last 24 hours) at 1/7/2023 0818  Last data filed at 1/7/2023 0652  Gross per 24 hour   Intake 600 ml   Output 1200 ml   Net -600 ml       Physical Exam:  General:  WN, NAD, A and O x3.  CV:  Irregular, tachy. No murmur, rub, or gallop.  Resp:  CTA Lacho, equal, nonlabored.  Abd:  Soft, + BS, no organomegaly. Nontender to palpation.  Extrem:  No edema BLE, 2+ pedal/PT pulses.            Results from last 7 days   Lab Units 01/06/23  0615   WBC 10*3/mm3 10.04   HEMOGLOBIN g/dL 12.6*   HEMATOCRIT % 38.0   PLATELETS 10*3/mm3 168     Results from last 7 days   Lab Units 01/06/23  0615   SODIUM mmol/L 137   POTASSIUM mmol/L 3.5   CHLORIDE mmol/L 101   CO2 mmol/L 26.0   BUN mg/dL 19   CREATININE mg/dL 0.76   CALCIUM mg/dL 8.5*   BILIRUBIN mg/dL 0.5   ALK PHOS U/L 66   ALT (SGPT) U/L 28   AST (SGOT) U/L 52*   GLUCOSE mg/dL 90         Results from last 7 " days   Lab Units 01/04/23  1113   TSH uIU/mL 3.730         Results from last 7 days   Lab Units 01/05/23  1011   PROBNP pg/mL 1,435.0*       Tele:  AFlutter/Fib    Assessment:  -  71 yo CM with PMHx HTN, blindness, ETOH, essential tremor admitted 1/4/2023 for multiple falls, progressive weakness and inability to care for himself.  -  BAILEE/LML pneumonia with associated acute respiratory failure  -  Tachy Mahin with upper rates 120s at admission with several pauses on tele.  Normal EF by echo  -  Atrial Flutter with controlled rates currently.  Not candidate for NOAC/AC with falls  -  Anechoic live lesion      Plan:  -  Hospitalist admitted and managing medical care, antibiotics  -  Again, not candidate for AC.  Discussion with patient yesterday about possible PPM.  No plans documented at this time.  Rates controlled earlier this a.m. , now 129 at time of evaluation.  Patient unaware of tachycardia.  Obtain EKG.  No rate response to vagal maneuvers. Give digoxin x 1 dose  -  Liver US did not identify any liver masses or masses adjacent to the liver but did show a slightly coarsened and nodular echotexture of the liver, concerning for cirrhosis    Resume beta-blocker for rate control of atrial fibrillation/flutter      I discussed the patient's findings and my recommendations with the patient, any present family members, and the nursing staff.  Marco A Cortez MD saw and examined patient, verified hx and PE, read all radiographic studies, reviewed labs and micro data, and formulated dx, plan for treatment and all medical decision making.      Mima Weaver PA-C  01/07/23, 08:18 EST

## 2023-01-07 NOTE — PLAN OF CARE
Goal Outcome Evaluation:      BP's elevated this shift; IV hydralazine given x1.  Patient unable to stay asleep, wakes often.  Remains on 2L NC.  Slightly anxious this shift, worried about how he will care for himself after discharge. Emotional support provided.  In and out of A-fib/A-flutter with controlled rates on tele

## 2023-01-08 ENCOUNTER — APPOINTMENT (OUTPATIENT)
Dept: GENERAL RADIOLOGY | Facility: HOSPITAL | Age: 73
DRG: 871 | End: 2023-01-08
Payer: MEDICARE

## 2023-01-08 LAB
ALBUMIN SERPL-MCNC: 2.2 G/DL (ref 3.5–5.2)
ALBUMIN/GLOB SERPL: 0.6 G/DL
ALP SERPL-CCNC: 199 U/L (ref 39–117)
ALT SERPL W P-5'-P-CCNC: 34 U/L (ref 1–41)
ANION GAP SERPL CALCULATED.3IONS-SCNC: 8 MMOL/L (ref 5–15)
AST SERPL-CCNC: 74 U/L (ref 1–40)
BILIRUB SERPL-MCNC: 0.9 MG/DL (ref 0–1.2)
BUN SERPL-MCNC: 5 MG/DL (ref 8–23)
BUN/CREAT SERPL: 6.6 (ref 7–25)
CALCIUM SPEC-SCNC: 7.8 MG/DL (ref 8.6–10.5)
CHLORIDE SERPL-SCNC: 101 MMOL/L (ref 98–107)
CO2 SERPL-SCNC: 23 MMOL/L (ref 22–29)
CREAT SERPL-MCNC: 0.76 MG/DL (ref 0.76–1.27)
DEPRECATED RDW RBC AUTO: 72.5 FL (ref 37–54)
EGFRCR SERPLBLD CKD-EPI 2021: 94.9 ML/MIN/1.73
ERYTHROCYTE [DISTWIDTH] IN BLOOD BY AUTOMATED COUNT: 18.7 % (ref 12.3–15.4)
FOLATE SERPL-MCNC: >20 NG/ML (ref 4.78–24.2)
GLOBULIN UR ELPH-MCNC: 3.7 GM/DL
GLUCOSE SERPL-MCNC: 85 MG/DL (ref 65–99)
HCT VFR BLD AUTO: 29.4 % (ref 37.5–51)
HGB BLD-MCNC: 9.6 G/DL (ref 13–17.7)
MAGNESIUM SERPL-MCNC: 1.5 MG/DL (ref 1.6–2.4)
MCH RBC QN AUTO: 34.3 PG (ref 26.6–33)
MCHC RBC AUTO-ENTMCNC: 32.7 G/DL (ref 31.5–35.7)
MCV RBC AUTO: 105 FL (ref 79–97)
PLATELET # BLD AUTO: 385 10*3/MM3 (ref 140–450)
PMV BLD AUTO: 9.7 FL (ref 6–12)
POTASSIUM SERPL-SCNC: 3.6 MMOL/L (ref 3.5–5.2)
POTASSIUM SERPL-SCNC: 4.1 MMOL/L (ref 3.5–5.2)
POTASSIUM SERPL-SCNC: 4.9 MMOL/L (ref 3.5–5.2)
PROT SERPL-MCNC: 5.9 G/DL (ref 6–8.5)
QT INTERVAL: 398 MS
QT INTERVAL: 484 MS
QTC INTERVAL: 450 MS
QTC INTERVAL: 497 MS
RBC # BLD AUTO: 2.8 10*6/MM3 (ref 4.14–5.8)
SODIUM SERPL-SCNC: 132 MMOL/L (ref 136–145)
VIT B12 BLD-MCNC: 1624 PG/ML (ref 211–946)
WBC NRBC COR # BLD: 7.16 10*3/MM3 (ref 3.4–10.8)

## 2023-01-08 PROCEDURE — 93010 ELECTROCARDIOGRAM REPORT: CPT | Performed by: INTERNAL MEDICINE

## 2023-01-08 PROCEDURE — 80053 COMPREHEN METABOLIC PANEL: CPT | Performed by: HOSPITALIST

## 2023-01-08 PROCEDURE — 84132 ASSAY OF SERUM POTASSIUM: CPT | Performed by: HOSPITALIST

## 2023-01-08 PROCEDURE — 71045 X-RAY EXAM CHEST 1 VIEW: CPT

## 2023-01-08 PROCEDURE — 94799 UNLISTED PULMONARY SVC/PX: CPT

## 2023-01-08 PROCEDURE — 25010000002 ENOXAPARIN PER 10 MG: Performed by: STUDENT IN AN ORGANIZED HEALTH CARE EDUCATION/TRAINING PROGRAM

## 2023-01-08 PROCEDURE — 82607 VITAMIN B-12: CPT | Performed by: NURSE PRACTITIONER

## 2023-01-08 PROCEDURE — 99232 SBSQ HOSP IP/OBS MODERATE 35: CPT | Performed by: NURSE PRACTITIONER

## 2023-01-08 PROCEDURE — 82746 ASSAY OF FOLIC ACID SERUM: CPT | Performed by: NURSE PRACTITIONER

## 2023-01-08 PROCEDURE — 83735 ASSAY OF MAGNESIUM: CPT | Performed by: HOSPITALIST

## 2023-01-08 PROCEDURE — 25010000002 MAGNESIUM SULFATE 2 GM/50ML SOLUTION: Performed by: STUDENT IN AN ORGANIZED HEALTH CARE EDUCATION/TRAINING PROGRAM

## 2023-01-08 PROCEDURE — 93005 ELECTROCARDIOGRAM TRACING: CPT | Performed by: PHYSICIAN ASSISTANT

## 2023-01-08 PROCEDURE — 85027 COMPLETE CBC AUTOMATED: CPT | Performed by: HOSPITALIST

## 2023-01-08 PROCEDURE — 25010000002 CEFTRIAXONE PER 250 MG: Performed by: STUDENT IN AN ORGANIZED HEALTH CARE EDUCATION/TRAINING PROGRAM

## 2023-01-08 RX ORDER — HYDROXYZINE HYDROCHLORIDE 25 MG/1
25 TABLET, FILM COATED ORAL ONCE
Status: DISCONTINUED | OUTPATIENT
Start: 2023-01-08 | End: 2023-01-08

## 2023-01-08 RX ORDER — IPRATROPIUM BROMIDE AND ALBUTEROL SULFATE 2.5; .5 MG/3ML; MG/3ML
3 SOLUTION RESPIRATORY (INHALATION)
Status: DISCONTINUED | OUTPATIENT
Start: 2023-01-08 | End: 2023-01-12 | Stop reason: HOSPADM

## 2023-01-08 RX ORDER — METOPROLOL TARTRATE 50 MG/1
50 TABLET, FILM COATED ORAL EVERY 12 HOURS SCHEDULED
Status: DISCONTINUED | OUTPATIENT
Start: 2023-01-08 | End: 2023-01-12 | Stop reason: HOSPADM

## 2023-01-08 RX ORDER — GUAIFENESIN 200 MG/10ML
200 LIQUID ORAL EVERY 6 HOURS
Status: DISCONTINUED | OUTPATIENT
Start: 2023-01-08 | End: 2023-01-11

## 2023-01-08 RX ORDER — HYDRALAZINE HYDROCHLORIDE 25 MG/1
25 TABLET, FILM COATED ORAL EVERY 8 HOURS SCHEDULED
Status: DISCONTINUED | OUTPATIENT
Start: 2023-01-08 | End: 2023-01-12 | Stop reason: HOSPADM

## 2023-01-08 RX ORDER — HYDRALAZINE HYDROCHLORIDE 25 MG/1
25 TABLET, FILM COATED ORAL ONCE
Status: COMPLETED | OUTPATIENT
Start: 2023-01-08 | End: 2023-01-08

## 2023-01-08 RX ADMIN — DOXYCYCLINE 100 MG: 100 CAPSULE ORAL at 09:14

## 2023-01-08 RX ADMIN — GUAIFENESIN 200 MG: 200 SOLUTION ORAL at 21:24

## 2023-01-08 RX ADMIN — MICONAZOLE NITRATE: 20 POWDER TOPICAL at 09:14

## 2023-01-08 RX ADMIN — Medication 10 ML: at 09:15

## 2023-01-08 RX ADMIN — GUAIFENESIN 200 MG: 200 SOLUTION ORAL at 16:41

## 2023-01-08 RX ADMIN — ASPIRIN 81 MG: 81 TABLET, COATED ORAL at 09:14

## 2023-01-08 RX ADMIN — Medication 5 MG: at 02:43

## 2023-01-08 RX ADMIN — MAGNESIUM SULFATE HEPTAHYDRATE 2 G: 2 INJECTION, SOLUTION INTRAVENOUS at 16:41

## 2023-01-08 RX ADMIN — POTASSIUM CHLORIDE 40 MEQ: 750 CAPSULE, EXTENDED RELEASE ORAL at 11:33

## 2023-01-08 RX ADMIN — MICONAZOLE NITRATE: 20 POWDER TOPICAL at 21:27

## 2023-01-08 RX ADMIN — ENOXAPARIN SODIUM 40 MG: 40 INJECTION SUBCUTANEOUS at 09:14

## 2023-01-08 RX ADMIN — HYDRALAZINE HYDROCHLORIDE 25 MG: 25 TABLET, FILM COATED ORAL at 05:31

## 2023-01-08 RX ADMIN — IPRATROPIUM BROMIDE AND ALBUTEROL SULFATE 3 ML: 2.5; .5 SOLUTION RESPIRATORY (INHALATION) at 19:49

## 2023-01-08 RX ADMIN — METOPROLOL TARTRATE 50 MG: 50 TABLET, FILM COATED ORAL at 21:26

## 2023-01-08 RX ADMIN — DOXYLAMINE SUCCINATE 25 MG: 25 TABLET ORAL at 02:38

## 2023-01-08 RX ADMIN — MAGNESIUM SULFATE HEPTAHYDRATE 2 G: 2 INJECTION, SOLUTION INTRAVENOUS at 11:33

## 2023-01-08 RX ADMIN — GUAIFENESIN 200 MG: 200 SOLUTION ORAL at 05:31

## 2023-01-08 RX ADMIN — SODIUM CHLORIDE 1 G: 900 INJECTION INTRAVENOUS at 00:15

## 2023-01-08 RX ADMIN — HYDRALAZINE HYDROCHLORIDE 25 MG: 25 TABLET, FILM COATED ORAL at 21:24

## 2023-01-08 RX ADMIN — HYDRALAZINE HYDROCHLORIDE 25 MG: 25 TABLET, FILM COATED ORAL at 14:37

## 2023-01-08 RX ADMIN — POTASSIUM CHLORIDE 40 MEQ: 750 CAPSULE, EXTENDED RELEASE ORAL at 16:41

## 2023-01-08 RX ADMIN — MAGNESIUM SULFATE HEPTAHYDRATE 2 G: 2 INJECTION, SOLUTION INTRAVENOUS at 14:37

## 2023-01-08 RX ADMIN — Medication 10 ML: at 21:24

## 2023-01-08 RX ADMIN — DOXYCYCLINE 100 MG: 100 CAPSULE ORAL at 21:24

## 2023-01-08 RX ADMIN — LIDOCAINE 1 PATCH: 50 PATCH CUTANEOUS at 09:13

## 2023-01-08 NOTE — PROGRESS NOTES
" [facility] - Progress Note    Olu Henriquez  1950  S451/1    01/08/23, 08:18 EST      Chief Complaint: Following for Tachy Mahin, AFlutter    Subjective:   Awake in bed.  Asking questions about his heart - in rapid AFib, unaware.    Didn't sleep well.  Tired.    Review of Systems:  Pertinent positives are listed above and in physical exam.  All others have been reviewed and are negative.    aspirin, 81 mg, Oral, Daily  doxycycline, 100 mg, Oral, Q12H  enoxaparin, 40 mg, Subcutaneous, Daily  lidocaine, 1 patch, Transdermal, Q24H  metoprolol tartrate, 25 mg, Oral, TID  miconazole, , Topical, Q12H  senna-docusate sodium, 2 tablet, Oral, BID  sodium chloride, 10 mL, Intravenous, Q12H        Objective:  Vitals:   height is 177.8 cm (70\") and weight is 77.6 kg (171 lb 1.2 oz). His oral temperature is 98.1 °F (36.7 °C). His blood pressure is 151/82 and his pulse is 49 (abnormal). His respiration is 19 and oxygen saturation is 93%.       Intake/Output Summary (Last 24 hours) at 1/8/2023 0856  Last data filed at 1/8/2023 0319  Gross per 24 hour   Intake 358 ml   Output 1300 ml   Net -942 ml       Physical Exam:  General:  WN, NAD, A and O x3.  CV:  Irregular, tachy. No murmur, rub, or gallop.  Resp:  CTA Lacho, equal, nonlabored.  Abd:  Soft, + BS, no organomegaly. Nontender to palpation.  Extrem:  No edema BLE, 2+ pedal/PT pulses.            Results from last 7 days   Lab Units 01/08/23  0637   WBC 10*3/mm3 7.16   HEMOGLOBIN g/dL 9.6*   HEMATOCRIT % 29.4*   PLATELETS 10*3/mm3 385     Results from last 7 days   Lab Units 01/08/23  0637   SODIUM mmol/L 132*   POTASSIUM mmol/L 4.9   CHLORIDE mmol/L 101   CO2 mmol/L 23.0   BUN mg/dL 5*   CREATININE mg/dL 0.76   CALCIUM mg/dL 7.8*   BILIRUBIN mg/dL 0.9   ALK PHOS U/L 199*   ALT (SGPT) U/L 34   AST (SGOT) U/L 74*   GLUCOSE mg/dL 85         Results from last 7 days   Lab Units 01/04/23  1113   TSH uIU/mL 3.730         Results from last 7 days   Lab Units " 01/05/23  1011   PROBNP pg/mL 1,435.0*       Tele:  AFlutter/Fib    Assessment:  -  73 yo CM with PMHx HTN, blindness, ETOH, essential tremor admitted 1/4/2023 for multiple falls, progressive weakness and inability to care for himself.  -  BAILEE/LML pneumonia with associated acute respiratory failure  -  Tachy Mahin with upper rates 120s at admission with several pauses on tele.  Normal EF by echo  -  Atrial Flutter with controlled rates currently.  Not candidate for NOAC/AC with falls  -  Anechoic liver lesion      Plan:  -  Hospitalist admitted and managing medical care, antibiotics  -  Again, not candidate for AC.  Discussion with patient yesterday about possible PPM.  No plans documented at this time.  Rates controlled earlier this a.m. , now 129 at time of evaluation.  Patient unaware of tachycardia.  Obtain EKG.  No rate response to vagal maneuvers. Give digoxin x 1 dose  -  Liver US did not identify any liver masses or masses adjacent to the liver but did show a slightly coarsened and nodular echotexture of the liver, concerning for cirrhosis      Atrial flutter rate controlled better with addition of beta-blocker  Continue beta-blocker for rate control of atrial fibrillation/flutter--change dose to 50 mg twice daily      Marco A Cortez MD

## 2023-01-08 NOTE — PROGRESS NOTES
[facility] Medicine Services  PROGRESS NOTE    Patient Name: Olu Henrqiuez  : 1950  MRN: [id number]    Date of Admission: 2023  Primary Care Physician: Jesse De Anda MD    Subjective   Subjective     CC:  F/u PNA    HPI:  Patient resting in bed, sleeping, awakes to voice.  Asks about getting Robitussin-DM regularly because he says it felt it helped him cough up more sputum.  Remains on 2 L nasal cannula, 94%, and does not wear home O2.  BP running high.  Denies abdominal pain.    ROS:  Gen- No fevers, chills  CV- No chest pain, palpitations  Resp- No dyspnea  GI- No N/V/D, abd pain    Objective   Objective     Vital Signs:   Temp:  [98.1 °F (36.7 °C)-99.5 °F (37.5 °C)] 98.1 °F (36.7 °C)  Heart Rate:  [] 49  Resp:  [17-20] 19  BP: (140-174)/() 151/82  Flow (L/min):  [2] 2     Physical Exam:  Constitutional: No acute distress, awake, alert  HENT: NCAT, mucous membranes moist  Respiratory: Diminished to auscultation LLL, respiratory effort normal   Cardiovascular: RRR, no murmurs, rubs, or gallops  Gastrointestinal: Positive bowel sounds, soft, nontender, nondistended  Musculoskeletal: No bilateral ankle edema  Psychiatric: Appropriate affect, cooperative  Neurologic: Oriented x 3, moves all extremities, blind, speech clear  Skin: No rashes      Results Reviewed:  LAB RESULTS:      Lab 23  0615 23  1011 23  2054 23  1435 23  1113   WBC 10.04 12.83*  --   --  17.97*   HEMOGLOBIN 12.6* 13.5  --   --  16.1   HEMATOCRIT 38.0 39.8  --   --  46.8   PLATELETS 168 178  --   --  224   NEUTROS ABS  --  9.82*  --   --  15.56*   IMMATURE GRANS (ABS)  --  0.13*  --   --  0.26*   LYMPHS ABS  --  1.64  --   --  0.81   MONOS ABS  --  1.18*  --   --  1.19*   EOS ABS  --  0.01  --   --  0.12   MCV 92.9 91.9  --   --  90.3   PROCALCITONIN  --   --   --   --  0.45*   LACTATE  --   --  1.3 2.2* 2.3*         Lab 23  0951 23  0615  01/05/23  1011 01/04/23 2054 01/04/23  1113   SODIUM 136 137 134*  --  136   POTASSIUM 3.4* 3.5 3.6 2.8* 2.8*   CHLORIDE 98 101 98  --  97*   CO2 27.0 26.0 27.0  --  22.0   ANION GAP 11.0 10.0 9.0  --  17.0*   BUN 13 19 20  --  35*   CREATININE 0.79 0.76 0.82  --  1.18   EGFR 93.8 94.9 92.8  --  65.2   GLUCOSE 147* 90 115*  --  138*   CALCIUM 8.2* 8.5* 8.2*  --  8.8   MAGNESIUM 1.5*  --  2.0 2.4 1.4*   PHOSPHORUS  --  3.4 2.2* 2.6  --    TSH  --   --   --   --  3.730         Lab 01/06/23  0615 01/05/23 1011 01/04/23  1113   TOTAL PROTEIN 5.5* 5.7* 7.2   ALBUMIN 3.0* 3.1* 3.4*   GLOBULIN 2.5 2.6 3.8   ALT (SGPT) 28 27 17   AST (SGOT) 52* 62* 40   BILIRUBIN 0.5 0.7 1.3*   ALK PHOS 66 73 73         Lab 01/05/23  1011 01/04/23 2054 01/04/23  1113   PROBNP 1,435.0*  --   --    TROPONIN T  --  0.010 <0.010                 Brief Urine Lab Results  (Last result in the past 365 days)      Color   Clarity   Blood   Leuk Est   Nitrite   Protein   CREAT   Urine HCG        01/04/23 1200 Dark Yellow   Clear   Negative   Negative   Negative   30 mg/dL (1+)                 Microbiology Results Abnormal     Procedure Component Value - Date/Time    Blood Culture - Blood, Hand, Left [[id number]]  (Normal) Collected: 01/04/23 1120    Lab Status: Preliminary result Specimen: Blood from Hand, Left Updated: 01/07/23 1146     Blood Culture No growth at 3 days    Blood Culture - Blood, Arm, Right [[id number]]  (Normal) Collected: 01/04/23 1115    Lab Status: Preliminary result Specimen: Blood from Arm, Right Updated: 01/07/23 1146     Blood Culture No growth at 3 days    Legionella Antigen, Urine - Urine, Urine, Clean Catch [[id number]]  (Normal) Collected: 01/04/23 2333    Lab Status: Final result Specimen: Urine, Clean Catch Updated: 01/05/23 1048     LEGIONELLA ANTIGEN, URINE Negative    S. Pneumo Ag Urine or CSF - Urine, Urine, Clean Catch [[id number]]  (Normal) Collected: 01/04/23 2333    Lab Status: Final result Specimen: Urine, Clean  Catch Updated: 01/05/23 1048     Strep Pneumo Ag Negative    MRSA Screen, PCR (Inpatient) - Swab, Nares [[id number]]  (Normal) Collected: 01/04/23 2125    Lab Status: Final result Specimen: Swab from Nares Updated: 01/05/23 0955     MRSA PCR Negative    Narrative:      The negative predictive value of this diagnostic test is high and should only be used to consider de-escalating anti-MRSA therapy. A positive result may indicate colonization with MRSA and must be correlated clinically.  MRSA Negative    COVID PRE-OP / PRE-PROCEDURE SCREENING ORDER (NO ISOLATION) - Swab, Nasopharynx [[id number]]  (Normal) Collected: 01/04/23 1118    Lab Status: Final result Specimen: Swab from Nasopharynx Updated: 01/04/23 1212    Narrative:      The following orders were created for panel order COVID PRE-OP / PRE-PROCEDURE SCREENING ORDER (NO ISOLATION) - Swab, Nasopharynx.  Procedure                               Abnormality         Status                     ---------                               -----------         ------                     COVID-19, FLU A/B, RSV P...[[id number]]  Normal              Final result                 Please view results for these tests on the individual orders.    COVID-19, FLU A/B, RSV PCR - Swab, Nasopharynx [[id number]]  (Normal) Collected: 01/04/23 1118    Lab Status: Final result Specimen: Swab from Nasopharynx Updated: 01/04/23 1212     COVID19 Not Detected     Influenza A PCR Not Detected     Influenza B PCR Not Detected     RSV, PCR Not Detected    Narrative:      Fact sheet for providers: https://www.fda.gov/media/744041/download    Fact sheet for patients: https://www.fda.gov/media/235548/download    Test performed by PCR.          US Liver    Result Date: 1/6/2023  US LIVER Date of Exam: 1/6/2023 9:55 AM EST Indication: large anechoic structure adjacent or within the liver, measuring 21.0 x 12.8 cm.. Comparison: Chest CT scan 1/4/2023. No previous abdominal imaging Technique: Grayscale and  color Doppler ultrasound evaluation of the right upper quadrant was performed. Findings: By history, the large anechoic structure noted within or adjacent to the liver was seen on echocardiogram. On today's ultrasound images, the liver has a slightly nodular contour and coarsened echotexture, potentially representing cirrhosis. No liver masses are identified and no liver cysts are seen. No cyst is seen immediately adjacent to liver. Pancreas is seen in limited fashion, but where visible appears grossly normal. Gallbladder is borderline thick walled, a 3 mm in width but this may be due to incomplete distention or possibly due to liver disease. There is trace calcification of the gallbladder wall. No gallstones or gallbladder wall debris are identified. Common duct is abnormally dilated, even allowing for the patient's age at approximately 9 mm. There is expected portal vein and hepatic vein waveform and directional flow. The right kidney appears normal and measures 11.5 cm in length. There is a trace amount of ascites adjacent to the kidney but practically no ascites elsewhere. Findings of a large cystic lesion adjacent to the liver on echocardiogram may represent patient's markedly distended bladder, today noted to be approximately 18 x 10 x 15 cm in maximal diameter. There is no obvious bladder mass or debris level. .     Impression: Impression: 1. Coarsened liver echotexture and mildly nodular liver capsule, questionable for mild changes of cirrhosis. Otherwise unremarkable appearance of liver, with no evidence of liver cyst or mass. 2. Mildly dilated common bile duct, at approximately 9 mm. 3. Markedly distended bladder to approximately 18 cm. Electronically Signed: Albert Nagel  1/6/2023 3:11 PM EST  Workstation ID: [id number]      Results for orders placed during the hospital encounter of 01/04/23    Adult Transthoracic Echo Complete W/ Cont if Necessary Per Protocol    Interpretation Summary  •  Left ventricular  systolic function is normal. Left ventricular ejection fraction appears to be 56 - 60%.  •  Left ventricular wall thickness is consistent with mild to moderate concentric hypertrophy.  •  Mildly reduced right ventricular systolic function noted.  •  Left atrial volume is moderately increased.  •  The right atrial cavity is mildly dilated.  •  Mild to moderate aortic valve regurgitation is present.  •  Moderate mitral valve regurgitation is present.  •  Mild tricuspid valve regurgitation is present.  •  Estimated right ventricular systolic pressure from tricuspid regurgitation is mildly elevated (35-45 mmHg).  •  Incidental finding of a large anechoic structure adjacent or within the liver, measuring 21.0 x 12.8 cm.      I have reviewed the medications:  Scheduled Meds:aspirin, 81 mg, Oral, Daily  doxycycline, 100 mg, Oral, Q12H  enoxaparin, 40 mg, Subcutaneous, Daily  lidocaine, 1 patch, Transdermal, Q24H  metoprolol tartrate, 25 mg, Oral, TID  miconazole, , Topical, Q12H  senna-docusate sodium, 2 tablet, Oral, BID  sodium chloride, 10 mL, Intravenous, Q12H      Continuous Infusions:   PRN Meds:.•  acetaminophen  •  senna-docusate sodium **AND** polyethylene glycol **AND** bisacodyl **AND** bisacodyl  •  doxylamine  •  guaifenesin  •  magnesium sulfate **OR** magnesium sulfate **OR** magnesium sulfate  •  melatonin  •  potassium & sodium phosphates **OR** potassium & sodium phosphates  •  potassium chloride **OR** potassium chloride **OR** potassium chloride  •  sodium chloride  •  sodium chloride  •  sodium chloride    Assessment & Plan   Assessment & Plan     Active Hospital Problems    Diagnosis  POA   • **Acute respiratory failure with hypoxia (HCC) [J96.01]  Yes   • CAP (community acquired pneumonia) [J18.9]  Yes   • Sepsis, unspecified organism ([facility]) [A41.9]  Yes   • Moderate dehydration [E86.0]  Yes   • Hypokalemia [E87.6]  Yes   • Hypomagnesemia [E83.42]  Yes   • Physical deconditioning [R53.81]  Unknown       Resolved Hospital Problems   No resolved problems to display.        Brief Hospital Course to date:  Olu Henriquez is a 72 y.o. male with PMH significant for HTN, essential tremor and glaucoma. He has slowly lost his sight over the past few months and is now legally blind. He has fallen multiple times at home due to the blindness. Admitted for sepsis / acute hypoxic respiratory failure secondary to LML / BAILEE community acquired pneumonia. Cardiology consulted for atrial flutter and concerns for tachy-gale syndrome. ECHO incidentally revealed a large, anechoic structure adjacent or within the liver measuring 21 x 12.8cm.      This patient's problems and plans were partially entered by my partner and updated as appropriate by me 01/08/23.    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 He is requiring assist x 2 and recommend SNF rehab  -Case management following        Expected  Discharge Location and Transportation: Home, car  Expected Discharge   Expected Discharge Date and Time     Expected Discharge Date Expected Discharge Time    Jose Juan 10, 2023            DVT prophylaxis:  Medical DVT prophylaxis orders are present.     AM-PAC 6 Clicks Score (PT): 11 (01/07/23 0800)    CODE STATUS:   Code Status and Medical Interventions:   Ordered at: 01/04/23 1452     Level Of Support Discussed With:    Patient     Code Status (Patient has no pulse and is not breathing):    CPR (Attempt to Resuscitate)     Medical Interventions (Patient has pulse or is breathing):    Full Support       Marily Centeno, APRN  01/08/23

## 2023-01-09 ENCOUNTER — APPOINTMENT (OUTPATIENT)
Dept: CT IMAGING | Facility: HOSPITAL | Age: 73
DRG: 871 | End: 2023-01-09
Payer: MEDICARE

## 2023-01-09 PROBLEM — I48.92 ATRIAL FLUTTER WITH CONTROLLED RESPONSE (HCC): Status: ACTIVE | Noted: 2023-01-09

## 2023-01-09 LAB
ALBUMIN SERPL-MCNC: 2.8 G/DL (ref 3.5–5.2)
ALBUMIN/GLOB SERPL: 0.9 G/DL
ALP SERPL-CCNC: 65 U/L (ref 39–117)
ALT SERPL W P-5'-P-CCNC: 48 U/L (ref 1–41)
ANION GAP SERPL CALCULATED.3IONS-SCNC: 9 MMOL/L (ref 5–15)
AST SERPL-CCNC: 62 U/L (ref 1–40)
BACTERIA SPEC AEROBE CULT: NORMAL
BACTERIA SPEC AEROBE CULT: NORMAL
BILIRUB SERPL-MCNC: 0.4 MG/DL (ref 0–1.2)
BUN SERPL-MCNC: 16 MG/DL (ref 8–23)
BUN/CREAT SERPL: 22.2 (ref 7–25)
CALCIUM SPEC-SCNC: 8.3 MG/DL (ref 8.6–10.5)
CHLORIDE SERPL-SCNC: 100 MMOL/L (ref 98–107)
CO2 SERPL-SCNC: 25 MMOL/L (ref 22–29)
CREAT SERPL-MCNC: 0.72 MG/DL (ref 0.76–1.27)
DEPRECATED RDW RBC AUTO: 45.5 FL (ref 37–54)
EGFRCR SERPLBLD CKD-EPI 2021: 96.5 ML/MIN/1.73
ERYTHROCYTE [DISTWIDTH] IN BLOOD BY AUTOMATED COUNT: 13.3 % (ref 12.3–15.4)
GLOBULIN UR ELPH-MCNC: 3.1 GM/DL
GLUCOSE SERPL-MCNC: 88 MG/DL (ref 65–99)
HCT VFR BLD AUTO: 41.6 % (ref 37.5–51)
HGB BLD-MCNC: 13.6 G/DL (ref 13–17.7)
LIPASE SERPL-CCNC: 46 U/L (ref 13–60)
MAGNESIUM SERPL-MCNC: 2.1 MG/DL (ref 1.6–2.4)
MCH RBC QN AUTO: 30.4 PG (ref 26.6–33)
MCHC RBC AUTO-ENTMCNC: 32.7 G/DL (ref 31.5–35.7)
MCV RBC AUTO: 93.1 FL (ref 79–97)
PLATELET # BLD AUTO: 255 10*3/MM3 (ref 140–450)
PMV BLD AUTO: 11.1 FL (ref 6–12)
POTASSIUM SERPL-SCNC: 4.3 MMOL/L (ref 3.5–5.2)
PROT SERPL-MCNC: 5.9 G/DL (ref 6–8.5)
RBC # BLD AUTO: 4.47 10*6/MM3 (ref 4.14–5.8)
SODIUM SERPL-SCNC: 134 MMOL/L (ref 136–145)
WBC NRBC COR # BLD: 9.06 10*3/MM3 (ref 3.4–10.8)

## 2023-01-09 PROCEDURE — 80053 COMPREHEN METABOLIC PANEL: CPT | Performed by: NURSE PRACTITIONER

## 2023-01-09 PROCEDURE — 25010000002 ENOXAPARIN PER 10 MG: Performed by: STUDENT IN AN ORGANIZED HEALTH CARE EDUCATION/TRAINING PROGRAM

## 2023-01-09 PROCEDURE — 97530 THERAPEUTIC ACTIVITIES: CPT

## 2023-01-09 PROCEDURE — 99222 1ST HOSP IP/OBS MODERATE 55: CPT | Performed by: INTERNAL MEDICINE

## 2023-01-09 PROCEDURE — 99232 SBSQ HOSP IP/OBS MODERATE 35: CPT | Performed by: NURSE PRACTITIONER

## 2023-01-09 PROCEDURE — 94799 UNLISTED PULMONARY SVC/PX: CPT

## 2023-01-09 PROCEDURE — 83690 ASSAY OF LIPASE: CPT | Performed by: NURSE PRACTITIONER

## 2023-01-09 PROCEDURE — 94664 DEMO&/EVAL PT USE INHALER: CPT

## 2023-01-09 PROCEDURE — 83735 ASSAY OF MAGNESIUM: CPT | Performed by: NURSE PRACTITIONER

## 2023-01-09 PROCEDURE — 85027 COMPLETE CBC AUTOMATED: CPT | Performed by: NURSE PRACTITIONER

## 2023-01-09 PROCEDURE — 99232 SBSQ HOSP IP/OBS MODERATE 35: CPT | Performed by: INTERNAL MEDICINE

## 2023-01-09 PROCEDURE — 94761 N-INVAS EAR/PLS OXIMETRY MLT: CPT

## 2023-01-09 PROCEDURE — 71250 CT THORAX DX C-: CPT

## 2023-01-09 RX ADMIN — HYDRALAZINE HYDROCHLORIDE 25 MG: 25 TABLET, FILM COATED ORAL at 05:06

## 2023-01-09 RX ADMIN — HYDRALAZINE HYDROCHLORIDE 25 MG: 25 TABLET, FILM COATED ORAL at 20:52

## 2023-01-09 RX ADMIN — MICONAZOLE NITRATE: 20 POWDER TOPICAL at 08:56

## 2023-01-09 RX ADMIN — METOPROLOL TARTRATE 50 MG: 50 TABLET, FILM COATED ORAL at 20:51

## 2023-01-09 RX ADMIN — METOPROLOL TARTRATE 50 MG: 50 TABLET, FILM COATED ORAL at 08:54

## 2023-01-09 RX ADMIN — Medication 10 ML: at 20:53

## 2023-01-09 RX ADMIN — IPRATROPIUM BROMIDE AND ALBUTEROL SULFATE 3 ML: 2.5; .5 SOLUTION RESPIRATORY (INHALATION) at 07:03

## 2023-01-09 RX ADMIN — GUAIFENESIN 200 MG: 200 SOLUTION ORAL at 20:52

## 2023-01-09 RX ADMIN — GUAIFENESIN 200 MG: 200 SOLUTION ORAL at 16:53

## 2023-01-09 RX ADMIN — MICONAZOLE NITRATE: 20 POWDER TOPICAL at 20:56

## 2023-01-09 RX ADMIN — IPRATROPIUM BROMIDE AND ALBUTEROL SULFATE 3 ML: 2.5; .5 SOLUTION RESPIRATORY (INHALATION) at 13:04

## 2023-01-09 RX ADMIN — DOXYLAMINE SUCCINATE 25 MG: 25 TABLET ORAL at 01:06

## 2023-01-09 RX ADMIN — ENOXAPARIN SODIUM 40 MG: 40 INJECTION SUBCUTANEOUS at 08:56

## 2023-01-09 RX ADMIN — HYDRALAZINE HYDROCHLORIDE 25 MG: 25 TABLET, FILM COATED ORAL at 13:24

## 2023-01-09 RX ADMIN — ACETAMINOPHEN 650 MG: 325 TABLET ORAL at 09:18

## 2023-01-09 RX ADMIN — GUAIFENESIN 200 MG: 200 SOLUTION ORAL at 08:55

## 2023-01-09 RX ADMIN — ASPIRIN 81 MG: 81 TABLET, COATED ORAL at 08:55

## 2023-01-09 RX ADMIN — Medication 5 MG: at 01:06

## 2023-01-09 RX ADMIN — ACETAMINOPHEN 650 MG: 325 TABLET ORAL at 13:24

## 2023-01-09 RX ADMIN — LIDOCAINE 1 PATCH: 50 PATCH CUTANEOUS at 08:56

## 2023-01-09 RX ADMIN — Medication 10 ML: at 08:56

## 2023-01-09 NOTE — DISCHARGE PLACEMENT REQUEST
"Linnea Reeder, RN    [phone]                MartinezOlu moon CHLOE (72 y.o. Male)     Date of Birth   1950    Social Security Number       Address   [address] [facility] [address]    Home Phone   [phone]    MRN   [id number]       Scientology   Taoist    Marital Status                               Admission Date   1/4/23    Admission Type   Emergency    Admitting Provider   Albert Escalante MD    Attending Provider   Albert Escalante MD    Department, Room/Bed   [address], S451/1       Discharge Date       Discharge Disposition       Discharge Destination                               Attending Provider: Albert Escalante MD    Allergies: No Known Allergies    Isolation: None   Infection: None   Code Status: CPR    Ht: 177.8 cm (70\")   Wt: 77.6 kg (171 lb 1.2 oz)    Admission Cmt: None   Principal Problem: Acute respiratory failure with hypoxia (HCC) [J96.01]                 Active Insurance as of 1/4/2023     Primary Coverage     Payor Plan Insurance Group Employer/Plan Group    MEDICARE MEDICARE A & B      Payor Plan Address Payor Plan Phone Number Payor Plan Fax Number Effective Dates    [address] [phone]  10/1/2014 - None Entered    [facility] [id number]       Subscriber Name Subscriber Birth Date Member ID       OLU GARCIA 1950 [id number]           Secondary Coverage     Payor Plan Insurance Group Employer/Plan Group    [place] [id number]     Payor Plan Address Payor Plan Phone Number Payor Plan Fax Number Effective Dates    [employer] [address] [address] [phone]  4/1/2020 - None Entered    [facility] [place] [address]       Subscriber Name Subscriber Birth Date Member ID       OUL GARCIA 1950 [id number]                 Emergency Contacts      (Rel.) Home Phone Work Phone Mobile Phone    DAIANA NICOLE (Brother) [phone] -- [phone]               History & Physical      Cordray, " "MD Concepcion at 23 1347              [facility] Medicine Services  HISTORY AND PHYSICAL    Patient Name: Olu Henriquez  : 1950  MRN: [id number]  Primary Care Physician: Jesse De Anda MD  Date of admission: 2023    Subjective    Subjective     Chief Complaint:  Unable to care for self    HPI:  Olu Henriquez is a 72 y.o. male with history of hypertension, benign essential tremor, vitamin D deficiency, blindness, alcohol use disorder, who presented for evaluation of inability to care for self.  Patient has not left his home in approximately 6 months per his brother who is at bedside.  Patient reports vision has been very poor for years and he is \"essentially blind\".  He has been very weak for years, which has not changed recently.  He has fallen 10-12 times in the past 2 weeks.  He has had bowel and bladder incontinence for the past month.  Reports of intermittent cough, but denies any fevers or chills.  No GI or  symptoms.  He initially reported stopping drinking alcohol 1 month ago, but later reports that it was only 1 week ago.    In the ED, patient was afebrile, tachycardic to 129, which improved to 76, satting 88% on room air, which improved to 100% on 2 L nasal cannula.  Labs were notable for UA with protein in 7-12 RBCs, COVID and flu and RSV were negative, blood cultures were sent, creatinine 1.18, potassium 2.8, magnesium 1.4, WBC count 17.97 with left shift, lactic 2.3, procalcitonin 0.45, TSH 3.7.  CTA of the chest did not reveal any evidence of pulmonary embolism, but did show left upper and left lower lobe pneumonia with likely reactive borderline enlarged mediastinal lymph nodes.  He wa given ceftriaxone and azithromycin along with DuoNeb, magnesium, potassium, and 2L of IV fluid.          Review of Systems   Constitutional: Negative for chills, fever and unexpected weight change.   HENT: Positive for dental problem. Negative for congestion, mouth sores and " rhinorrhea.    Eyes: Positive for visual disturbance. Negative for pain.   Respiratory: Positive for cough. Negative for shortness of breath and wheezing.    Cardiovascular: Negative for chest pain, palpitations and leg swelling.   Gastrointestinal: Negative for abdominal pain, constipation, diarrhea, nausea and vomiting.   Genitourinary: Negative for difficulty urinating and dysuria.   Musculoskeletal: Positive for gait problem. Negative for back pain.   Skin: Positive for wound. Negative for rash.   Neurological: Positive for tremors. Negative for dizziness, seizures, syncope, speech difficulty, numbness and headaches.   Psychiatric/Behavioral: Negative for agitation and behavioral problems.        All other systems reviewed and are negative.     Personal History     Past Medical History:   Diagnosis Date   • Alcoholic (HCC)    • Anxiety    • Blindness    • Concussion 1970   • Depression    • Essential tremor    • Hypertension    • Pneumonia 2019   • Shingles 1965             Past Surgical History:   Procedure Laterality Date   • CHEST SURGERY  2009    Chest clean out aftrer PNA with multiple chest tubes   • EYE SURGERY     • TONSILLECTOMY         Family History:  family history includes Alcohol abuse in his paternal grandfather; Colon cancer in his father; Heart disease in his maternal grandfather; Hyperthyroidism in his brother; Lung disease in his paternal grandfather; No Known Problems in his daughter, maternal grandmother, paternal grandmother, sister, and sister; Other (age of onset: 87) in his mother. Otherwise pertinent FHx was reviewed and unremarkable.     Social History:  reports that he quit smoking 10 days ago. His smoking use included cigarettes. He has a 58.00 pack-year smoking history. He has never used smokeless tobacco. He reports current alcohol use of about 6.0 standard drinks per week. He reports that he does not use drugs.  Social History     Social History Narrative   • Not on file        Medications:  aspirin, ibuprofen, and propranolol    No Known Allergies    Objective    Objective     Vital Signs:   Temp:  [98.2 °F (36.8 °C)-99.1 °F (37.3 °C)] 98.8 °F (37.1 °C)  Heart Rate:  [] 86  Resp:  [18-20] 18  BP: (108-154)/() 140/78  Flow (L/min):  [2] 2    Physical Exam   Constitutional: Awake, alert, unkept  Eyes: Pupils equal, sclerae anicteric, no conjunctival injection  HENT: NCAT, mucous membranes moist, very poor dentition  Neck: Supple, no thyromegaly, no lymphadenopathy, trachea midline  Respiratory: Left posterior lung fields with scattered crackles and rales, no wheezing, nonlabored respirations   Cardiovascular: RRR, no murmurs, rubs, or gallops  Gastrointestinal: Positive bowel sounds, soft, nontender, nondistended  Musculoskeletal: No bilateral ankle edema, no clubbing or cyanosis to extremities  Psychiatric: Appropriate affect, cooperative  Neurologic: Oriented x 3, strength symmetric in all extremities, Cranial Nerves grossly intact to confrontation, speech clear  Skin: No rashes on exposed skin    Result Review:  I have personally reviewed the results from the time of this admission to 1/4/2023 17:41 EST and agree with these findings:  []  Laboratory list / accordion  [x]  Microbiology  [x]  Radiology  []  EKG/Telemetry   []  Cardiology/Vascular   []  Pathology  [x]  Old records  []  Other:  Most notable findings include: as per HPI    LAB RESULTS:      Lab 01/04/23  1435 01/04/23  1113   WBC  --  17.97*   HEMOGLOBIN  --  16.1   HEMATOCRIT  --  46.8   PLATELETS  --  224   NEUTROS ABS  --  15.56*   IMMATURE GRANS (ABS)  --  0.26*   LYMPHS ABS  --  0.81   MONOS ABS  --  1.19*   EOS ABS  --  0.12   MCV  --  90.3   PROCALCITONIN  --  0.45*   LACTATE 2.2* 2.3*         Lab 01/04/23  1113   SODIUM 136   POTASSIUM 2.8*   CHLORIDE 97*   CO2 22.0   ANION GAP 17.0*   BUN 35*   CREATININE 1.18   EGFR 65.2   GLUCOSE 138*   CALCIUM 8.8   MAGNESIUM 1.4*   TSH 3.730         Lab  01/04/23  1113   TOTAL PROTEIN 7.2   ALBUMIN 3.4*   GLOBULIN 3.8   ALT (SGPT) 17   AST (SGOT) 40   BILIRUBIN 1.3*   ALK PHOS 73         Lab 01/04/23 1113   TROPONIN T <0.010                 Brief Urine Lab Results  (Last result in the past 365 days)      Color   Clarity   Blood   Leuk Est   Nitrite   Protein   CREAT   Urine HCG        01/04/23 1200 Dark Yellow   Clear   Negative   Negative   Negative   30 mg/dL (1+)               Microbiology Results (last 10 days)     Procedure Component Value - Date/Time    COVID PRE-OP / PRE-PROCEDURE SCREENING ORDER (NO ISOLATION) - Swab, Nasopharynx [[id number]]  (Normal) Collected: 01/04/23 1118    Lab Status: Final result Specimen: Swab from Nasopharynx Updated: 01/04/23 1212    Narrative:      The following orders were created for panel order COVID PRE-OP / PRE-PROCEDURE SCREENING ORDER (NO ISOLATION) - Swab, Nasopharynx.  Procedure                               Abnormality         Status                     ---------                               -----------         ------                     COVID-19, FLU A/B, RSV P...[[id number]]  Normal              Final result                 Please view results for these tests on the individual orders.    COVID-19, FLU A/B, RSV PCR - Swab, Nasopharynx [[id number]]  (Normal) Collected: 01/04/23 1118    Lab Status: Final result Specimen: Swab from Nasopharynx Updated: 01/04/23 1212     COVID19 Not Detected     Influenza A PCR Not Detected     Influenza B PCR Not Detected     RSV, PCR Not Detected    Narrative:      Fact sheet for providers: https://www.fda.gov/media/048545/download    Fact sheet for patients: https://www.fda.gov/media/531733/download    Test performed by PCR.          CT Head Without Contrast    Result Date: 1/4/2023  CT HEAD WO CONTRAST Date of Exam: 1/4/2023 2:47 PM EST Indication: ams. Comparison: None available. Technique: Axial CT images were obtained of the head without contrast administration.  Reconstructed  coronal and sagittal images were also obtained. Automated exposure control and iterative construction methods were used. FINDINGS: There is no evidence for acute intracranial hemorrhage. No definitive acute focal ischemia is identified. Nonspecific diffuse white matter changes are noted likely related to chronic small vessel ischemic changes and age-related changes. Associated diffuse volume loss is observed. A centralized pattern of volume loss is noted. There is therefore a prominent appearance of the ventricles felt to be related to volume loss. Communicating hydrocephalus is felt less likely. There is no evidence for abnormal cerebral edema. There is no mass effect or midline shift. The basal cisterns are patent. The skull is intact. The paranasal sinuses and mastoid air cells are clear.     Impression: 1.No evidence for acute intracranial abnormality. 2.Diffuse nonspecific white matter changes are noted with associated diffuse volume loss. These findings are likely related to chronic small vessel ischemic changes and/or age-related changes. 3.There appears to be a centralized pattern of volume loss which results in a prominent appearance of the ventricular system. Changes secondary to communicating hydrocephalus are felt less likely. Electronically Signed: Umer Kc  1/4/2023 3:07 PM EST  Workstation ID: [id number]    XR Chest 1 View    Result Date: 1/4/2023  XR CHEST 1 VW Date of Exam: 1/4/2023 10:46 AM EST Indication: Weakness, dizziness, altered mental status. Comparison: 1/26/2015. Findings: There is elevation of the left hemidiaphragm. There is abnormal consolidation in the left lung, particularly in the left lung base, felt to represent pneumonia. There is a questionable left basilar pleural effusion. The right lung and pleural space are clear. The heart is borderline enlarged. The pulmonary vascular markings are normal. There is a mild dextroscoliosis of the thoracolumbar spine. There are underlying  degenerative changes.     Impression: Impression: 1. Elevation of the left hemidiaphragm. 2. Abnormal consolidation in the left lung consistent with pneumonia. 3. Questionable small left basilar pleural effusion. Electronically Signed: Reza Smith  1/4/2023 10:59 AM EST  Workstation ID: [id number]    CT Angiogram Chest    Result Date: 1/4/2023  CT ANGIOGRAM CHEST Date of Exam: 1/4/2023 1:00 PM EST Indication: pe. Comparison: None available. Technique: CTA of the chest was performed after the uneventful intravenous administration of 75 mL Isovue-370. Reconstructed coronal and sagittal images were also obtained. In addition, a 3-D volume rendered image was created for interpretation. Automated exposure control and iterative reconstruction methods were used. Findings: There is no pathologic axillary adenopathy or other worrisome body wall soft tissue finding in the chest. No acute findings are present in the partially characterized upper abdomen. There is no pleural or pericardial effusion. Mildly prominent, favored reactive mediastinal lymph nodes are present, for example an AP window lymph node measuring 11 mm short axis. Mildly atherosclerotic, nonaneurysmal thoracic aorta. The pulmonary arteries are well-opacified and without evidence of focal filling defect concerning for acute pulmonary embolus. Evaluation of the osseous structures demonstrates no evidence of acute fracture or aggressive osseous lesion, with multilevel thoracic spondylosis change present. Evaluation of the lung fields demonstrates areas of  peribronchial consolidation and groundglass opacity throughout the left upper and left lower lobes with most confluent consolidation present in the left lower lobe     Impression: Impression: No evidence of pulmonary embolus. Findings of left upper and left lower lobe pneumonia as above. Likely reactive borderline enlarged mediastinal lymph nodes are present. Electronically Signed: Zaki Minor  1/4/2023 1:32 PM  EST  Workstation ID: [id number]          Assessment & Plan   Assessment & Plan       Acute respiratory failure with hypoxia (HCC)    CAP (community acquired pneumonia)    Sepsis, unspecified organism (HCC)    Moderate dehydration    Hypokalemia    Hypomagnesemia    Physical deconditioning      Olu Henriquez is a 72 y.o. male with history of hypertension, benign essential tremor, vitamin D deficiency, blindness, alcohol use disorder, who presented for evaluation of difficulty breathing.     #Sepsis  #Lactic acidosis  #Acute hypoxemic respiratory failure secondary to community-acquired pneumonia  #Moderate dehydration  #Likely reactive borderline enlarged mediastinal lymph nodes  -Tachycardia to 129, WBC count 17.97, lactic 2.3, procalcitonin 0.45  -CTA of the chest did not reveal any evidence of pulmonary embolism, but did show left upper and left lower lobe pneumonia with likely reactive borderline enlarged mediastinal lymph nodes  -COVID and flu and RSV were negative  -Follow-up blood culture sent on 1/4  -Continue ceftriaxone and doxycycline for 5-day course  -MRSA nares, Legionella, strep pneumo screens pending  -Cardiac monitoring  -Status post 2 L IV fluids, additional liter of LR ordered  -Trend lactic    #Hypokalemia  #Hypomagnesemia  -Replace per protocol  -AM labs    #RBBB  #Prolonged QTc  -Given Azithromycin in the ER, this was discontinued  -ECHO pending  -Repeat EKG pending, replacing electrolytes  -Troponin pending (added to earlier labs)  -Cards consulted  -Tele monitoring    #UA with blood and protein  -Recommend outpatient follow-up with PCP    #Deconditioning  #Recurrent falls  #Bowel and bladder incontinence  #Blindness  -PT and OT consults; patient and his brother are interested in long-term placement following his hospitalization  -Follow-up CT head to evaluate for possible prior stroke vs NPH    #HTN  #Vitamin D deficiency  #Blindness  #Benign essential tremor  -Continue Propranolol 40mg once  "daily  -Repeat vitamin D level pending    #Alcohol use disorder  -Reports no alcohol for the past week  -No history of alcohol withdrawal or alcohol withdrawal seizures  -We will give thiamine, folic acid  -[facility] protocol    DVT prophylaxis: Lovenox    CODE STATUS:  FULL  Level Of Support Discussed With: Patient  Code Status (Patient has no pulse and is not breathing): CPR (Attempt to Resuscitate)  Medical Interventions (Patient has pulse or is breathing): Full Support      Expected Discharge  Expected Discharge Date and Time     Expected Discharge Date Expected Discharge Time    Jan 6, 2023            This note has been completed as part of a split-shared workflow.     Signature: Electronically signed by Concepcion Mccarthy MD, 01/04/23, 5:50 PM EST                Electronically signed by Concepcion Mccarthy MD at 01/04/23 1750          Physician Progress Notes (most recent note)      Marco A Cortez MD at 01/08/23 0856           [facility] - Progress Note    Olu Henriquez  1950  S451/1    01/08/23, 08:18 EST      Chief Complaint: Following for Tachy Mahin, AFlutter    Subjective:   Awake in bed.  Asking questions about his heart - in rapid AFib, unaware.    Didn't sleep well.  Tired.    Review of Systems:  Pertinent positives are listed above and in physical exam.  All others have been reviewed and are negative.    aspirin, 81 mg, Oral, Daily  doxycycline, 100 mg, Oral, Q12H  enoxaparin, 40 mg, Subcutaneous, Daily  lidocaine, 1 patch, Transdermal, Q24H  metoprolol tartrate, 25 mg, Oral, TID  miconazole, , Topical, Q12H  senna-docusate sodium, 2 tablet, Oral, BID  sodium chloride, 10 mL, Intravenous, Q12H        Objective:  Vitals:   height is 177.8 cm (70\") and weight is 77.6 kg (171 lb 1.2 oz). His oral temperature is 98.1 °F (36.7 °C). His blood pressure is 151/82 and his pulse is 49 (abnormal). His respiration is 19 and oxygen saturation is 93%.       Intake/Output Summary (Last 24 hours) at " 1/8/2023 0856  Last data filed at 1/8/2023 0319  Gross per 24 hour   Intake 358 ml   Output 1300 ml   Net -942 ml       Physical Exam:  General:  WN, NAD, A and O x3.  CV:  Irregular, tachy. No murmur, rub, or gallop.  Resp:  CTA Lacho, equal, nonlabored.  Abd:  Soft, + BS, no organomegaly. Nontender to palpation.  Extrem:  No edema BLE, 2+ pedal/PT pulses.            Results from last 7 days   Lab Units 01/08/23  0637   WBC 10*3/mm3 7.16   HEMOGLOBIN g/dL 9.6*   HEMATOCRIT % 29.4*   PLATELETS 10*3/mm3 385     Results from last 7 days   Lab Units 01/08/23  0637   SODIUM mmol/L 132*   POTASSIUM mmol/L 4.9   CHLORIDE mmol/L 101   CO2 mmol/L 23.0   BUN mg/dL 5*   CREATININE mg/dL 0.76   CALCIUM mg/dL 7.8*   BILIRUBIN mg/dL 0.9   ALK PHOS U/L 199*   ALT (SGPT) U/L 34   AST (SGOT) U/L 74*   GLUCOSE mg/dL 85         Results from last 7 days   Lab Units 01/04/23  1113   TSH uIU/mL 3.730         Results from last 7 days   Lab Units 01/05/23  1011   PROBNP pg/mL 1,435.0*       Tele:  AFlutter/Fib    Assessment:  -  71 yo CM with PMHx HTN, blindness, ETOH, essential tremor admitted 1/4/2023 for multiple falls, progressive weakness and inability to care for himself.  -  BAILEE/LML pneumonia with associated acute respiratory failure  -  Tachy Mahin with upper rates 120s at admission with several pauses on tele.  Normal EF by echo  -  Atrial Flutter with controlled rates currently.  Not candidate for NOAC/AC with falls  -  Anechoic liver lesion      Plan:  -  Hospitalist admitted and managing medical care, antibiotics  -  Again, not candidate for AC.  Discussion with patient yesterday about possible PPM.  No plans documented at this time.  Rates controlled earlier this a.m. , now 129 at time of evaluation.  Patient unaware of tachycardia.  Obtain EKG.  No rate response to vagal maneuvers. Give digoxin x 1 dose  -  Liver US did not identify any liver masses or masses adjacent to the liver but did show a slightly coarsened and nodular  echotexture of the liver, concerning for cirrhosis      Atrial flutter rate controlled better with addition of beta-blocker  Continue beta-blocker for rate control of atrial fibrillation/flutter--change dose to 50 mg twice daily      Marco A Cortez MD                        Electronically signed by Marco A Cortez MD at 23 5231          Physical Therapy Notes (most recent note)      Francisca Rodriguez, PT at 23 1131  Version 1 of 1         Patient Name: Olu Henriquez  : 1950    MRN: [id number]                              Today's Date: 2023       Admit Date: 2023    Visit Dx:     ICD-10-CM ICD-9-CM   1. Acute respiratory failure with hypoxia (HCC)  J96.01 518.81   2. Pneumonia due to infectious organism, unspecified laterality, unspecified part of lung  J18.9 486   3. Sepsis without acute organ dysfunction, due to unspecified organism (HCC)  A41.9 038.9     995.91   4. Hypokalemia  E87.6 276.8   5. 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HCC)   • Medicare annual wellness visit, subsequent   • Vitamin D deficiency   • Acute respiratory failure with hypoxia (HCC)   • CAP (community acquired pneumonia)   • Sepsis, unspecified organism (HCC)   • Moderate dehydration   • Hypokalemia   • Hypomagnesemia   • Physical deconditioning     Past Medical History:   Diagnosis Date   • Alcoholic (HCC)    • Anxiety    • Blindness    • Concussion    • Depression    • Essential tremor    • Hypertension    • Pneumonia 2019   • Shingles 1965     Past Surgical History:   Procedure Laterality Date   • CHEST SURGERY  2009    Chest clean out aftrer PNA with multiple chest tubes   • EYE SURGERY     • TONSILLECTOMY        General Information     Row Name 23 1207          Physical Therapy Time and Intention    Document Type evaluation  -BA     Mode of Treatment physical therapy  -BA     Row Name 23  1207          General Information    Patient Profile Reviewed yes  -BA     Prior Level of Function independent:;all household mobility;gait;transfer;bed mobility;ADL's  Reported he uses a walker for ambulation.  Hx multiple recent falls with ~10 falls in the last 2 weeks.  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  Brother has been assisting.  -BA     Existing Precautions/Restrictions fall;oxygen therapy device and L/min;seizures;other (see comments)  blind; BUE essential tremors; bladder/bowel incontinence; anxiety  -BA     Barriers to Rehab medically complex;previous functional deficit;visual deficit;ineffective coping  -BA     Row Name 01/05/23 1207          Living Environment    People in Home alone  -BA     Row Name 01/05/23 1207          Home Main Entrance    Number of Stairs, Main Entrance one  -BA     Stair Railings, Main Entrance none  -BA     Row Name 01/05/23 1207          Stairs Within Home, Primary    Number of Stairs, Within Home, Primary none  -BA     Row Name 01/05/23 1207          Cognition    Orientation Status (Cognition) oriented x 3  -BA     Row Name 01/05/23 1207          Safety Issues, Functional Mobility    Safety Issues Affecting Function (Mobility) awareness of need for assistance;insight into deficits/self-awareness;impulsivity;judgment;problem-solving;safety precaution awareness;safety precautions follow-through/compliance;sequencing abilities;at risk behavior observed;ability to follow commands  -BA     Impairments Affecting Function (Mobility) balance;coordination;endurance/activity tolerance;motor control;motor planning;pain;postural/trunk control;sensation/sensory awareness;shortness of breath;strength;visual/perceptual;cognition  -BA     Cognitive Impairments, Mobility Safety/Performance insight into deficits/self-awareness;problem-solving/reasoning;judgment;safety precaution awareness;safety precaution follow-through;impulsivity;awareness,  need for assistance;sequencing abilities  -     Comment, Safety Issues/Impairments (Mobility) Anxious and becomes easily discouraged with self.  Requires constant VCs/TCs for reassurance, encouragment, and proprioception/sense of direction d/t blindness.  Some difficulty with command following at times d/t becoming anxious and talking over therapist.  -           User Key  (r) = Recorded By, (t) = Taken By, (c) = Cosigned By    Initials Name Provider Type     Francisca Rodriguez PT Physical Therapist               Mobility     Row Name 01/05/23 1217          Bed Mobility    Bed Mobility supine-sit;scooting/bridging  -     Scooting/Bridging [place] (Bed Mobility) contact guard;verbal cues;nonverbal cues (demo/gesture)  -     Supine-Sit [place] (Bed Mobility) minimum assist (75% patient effort);2 person assist;verbal cues;nonverbal cues (demo/gesture)  -     Assistive Device (Bed Mobility) bed rails;head of bed elevated  -     Comment, (Bed Mobility) Impulsive.  Requires frequent VCs/TCs for proprioception, direction, sequencing, and hand placement.  Reported no dizziness.  -     Row Name 01/05/23 1217          Bed-Chair Transfer    Bed-Chair [place] (Transfers) minimum assist (75% patient effort);moderate assist (50% patient effort);2 person assist;verbal cues  -     Assistive Device (Bed-Chair Transfers) other (see comments);walker, front-wheeled  BUE support transitioning to FWW  -     Comment, (Bed-Chair Transfer) Transferred from bed to chair and from chair to BSC with taking a few lateral steps toward L side with BUE support and minAx2.  Progressed to modAx2 with attempt at use of FWW when transferring from BSC back to chair with taking a few lateral steps toward R side.  VCs/TCs for sequencing of steps, direction, walker management, and hand placement with reaching back to feel for seated surface prior to sitting down.  Becomes anxious, unsteady, and fearful of falling.  -      Row Name 01/05/23 1217          Sit-Stand Transfer    Sit-Stand [place] (Transfers) minimum assist (75% patient effort);2 person assist;verbal cues  -     Assistive Device (Sit-Stand Transfers) other (see comments);walker, front-wheeled  BUE support transitioning to FWW  -     Comment, (Sit-Stand Transfer) STS x 3 reps from EOB, chair, and BSC.  Impulsive and attempts to stand prior to assistance being given.  VCs/TCs for sequencing and hand placement.  -     Row Name 01/05/23 1217          Gait/Stairs (Locomotion)    Comment, (Gait/Stairs) Amb deferred d/t fatigue and increased anxiety with transfers.  -           User Key  (r) = Recorded By, (t) = Taken By, (c) = Cosigned By    Initials Name Provider Type    Francisca Guerra, PT Physical Therapist               Obj/Interventions     Row Name 01/05/23 1223          Range of Motion Comprehensive    General Range of Motion bilateral lower extremity ROM WFL  -     Row Name 01/05/23 1223          Strength Comprehensive (MMT)    General Manual Muscle Testing (MMT) Assessment lower extremity strength deficits identified  -     Comment, General Manual Muscle Testing (MMT) Assessment LLE grossly 4/5.  R hip and knee grossly 4-/5, R ankle grossly 4/5.  -     Row Name 01/05/23 1223          Motor Skills    Motor Skills coordination;functional endurance  -     Coordination gross motor deficit;bilateral;lower extremity;minimal impairment  -     Functional Endurance Decreased functional endurance.  Fatigues with activity and requires rest breaks.  -     Row Name 01/05/23 1223          Balance    Balance Assessment sitting static balance;sitting dynamic balance;sit to stand dynamic balance;standing static balance;standing dynamic balance  -BA     Static Sitting Balance standby assist;verbal cues  -BA     Dynamic Sitting Balance contact guard;verbal cues  -BA     Position, Sitting Balance unsupported;sitting edge of bed;sitting in chair;other (see  comments)  sitting on BS  -BA     Sit to Stand Dynamic Balance minimal assist;2-person assist;verbal cues  -BA     Static Standing Balance minimal assist;verbal cues  -BA     Dynamic Standing Balance minimal assist;moderate assist;2-person assist;verbal cues  -BA     Position/Device Used, Standing Balance supported;other (see comments);walker, front-wheeled  BUE support  -     Balance Interventions sitting;sit to stand;standing;supported;static;dynamic;occupation based/functional task  -     Comment, Balance Mild/mod instability with standing and transfers with BUE support and FWW; no overt LOB noted.  Becomes unsteady with ataxic-like motions.  Highly anxious, with tremors, and fearful of falling.  Improved stability with use of BUE support vs. FWW at this time.  -     Row Name 01/05/23 1223          Sensory Assessment (Somatosensory)    Sensory Assessment (Somatosensory) LE sensation intact;bilateral UE  -BA     Bilateral UE Sensory Assessment light touch awareness;impaired;other (see comments)  Reported numbness in B hands and fingers.  -           User Key  (r) = Recorded By, (t) = Taken By, (c) = Cosigned By    Initials Name Provider Type     Francisca Rodriguez, PT Physical Therapist               Goals/Plan     Row Name 01/05/23 1406          Bed Mobility Goal 1 (PT)    Activity/Assistive Device (Bed Mobility Goal 1, PT) sit to supine/supine to sit  -     [place] Level/Cues Needed (Bed Mobility Goal 1, PT) standby assist  -     Time Frame (Bed Mobility Goal 1, PT) long term goal (LTG);2 weeks  -     Row Name 01/05/23 1400          Transfer Goal 1 (PT)    Activity/Assistive Device (Transfer Goal 1, PT) sit-to-stand/stand-to-sit;bed-to-chair/chair-to-bed;walker, rolling  -     [place] Level/Cues Needed (Transfer Goal 1, PT) contact guard required  -     Time Frame (Transfer Goal 1, PT) long term goal (LTG);2 weeks  -     Row Name 01/05/23 7918          Gait Training Goal 1 (PT)     Activity/Assistive Device (Gait Training Goal 1, PT) gait (walking locomotion);assistive device use;walker, rolling  -BA     [place] Level (Gait Training Goal 1, PT) contact guard required  -BA     Distance (Gait Training Goal 1, PT) 50  -BA     Time Frame (Gait Training Goal 1, PT) long term goal (LTG);2 weeks  -BA     Row Name 01/05/23 1409          Patient Education Goal (PT)    Activity (Patient Education Goal, PT) HEP  -BA     [place]/Cues/Accuracy (Memory Goal 2, PT) demonstrates adequately  -BA     Time Frame (Patient Education Goal, PT) long term goal (LTG);2 weeks  -BA     Row Name 01/05/23 1409          Therapy Assessment/Plan (PT)    Planned Therapy Interventions (PT) balance training;bed mobility training;gait training;home exercise program;motor coordination training;neuromuscular re-education;patient/family education;postural re-education;strengthening;transfer training  -BA           User Key  (r) = Recorded By, (t) = Taken By, (c) = Cosigned By    Initials Name Provider Type    Francisca Guerra, PT Physical Therapist               Clinical Impression     Row Name 01/05/23 1401          Pain    Pretreatment Pain Rating 6/10  -BA     Posttreatment Pain Rating 6/10  -BA     Pain Location generalized  -BA     Pain Location - back  -BA     Pre/Posttreatment Pain Comment Tolerated activity; RN aware and managing.  -BA     Pain Intervention(s) Ambulation/increased activity;Repositioned  -BA     Row Name 01/05/23 1401          Plan of Care Review    Plan of Care Reviewed With patient;sibling  -BA     Outcome Evaluation PT initial Eval completed.  Pt presents with generalized weakness, balance impairments, visual deficits (now with complete blindness), decreased functional endurance, gait instability, and decreased indep/safety with functional mobility.  Supine to sit with minAx2.  STS x 3 reps with minAx2 and BUE support vs. FWW.  Bed to chair and chair to BSC transfer with minAx2 and BUE  support.  BSC back to chair transfer with modAx2 with use of FWW.  Highly anxious throughout mobility requiring frequent VCs/TCs for reassurance, proprioception, and encouragement.  Pt warrants skilled IP PT to improve indep with mobility and return to PLOF.  Rec SNF upon d/c.  -     Row Name 01/05/23 1401          Therapy Assessment/Plan (PT)    Rehab Potential (PT) good, to achieve stated therapy goals  -     Criteria for Skilled Interventions Met (PT) yes;meets criteria;skilled treatment is necessary  -     Therapy Frequency (PT) daily  -     Row Name 01/05/23 1401          Vital Signs    Pre Systolic BP Rehab 122  -BA     Pre Treatment Diastolic BP 67  -BA     Post Systolic BP Rehab 140  -BA     Post Treatment Diastolic BP 85  -BA     Pretreatment Heart Rate (beats/min) 78  -BA     Posttreatment Heart Rate (beats/min) 70  -BA     Pre SpO2 (%) 94  -BA     O2 Delivery Pre Treatment nasal cannula  1L  -BA     O2 Delivery Intra Treatment nasal cannula  1L  -BA     Post SpO2 (%) 94  -BA     O2 Delivery Post Treatment nasal cannula  1L  -BA     Pre Patient Position Supine  -BA     Post Patient Position Sitting  -     Row Name 01/05/23 1401          Positioning and Restraints    Pre-Treatment Position in bed  -BA     Post Treatment Position chair  -BA     In Chair notified nsg;reclined;sitting;call light within reach;encouraged to call for assist;exit alarm on;waffle cushion;legs elevated  -           User Key  (r) = Recorded By, (t) = Taken By, (c) = Cosigned By    Initials Name Provider Type     Francisca Rodriguez, PT Physical Therapist               Outcome Measures     Row Name 01/05/23 1413 01/05/23 0800       How much help from another person do you currently need...    Turning from your back to your side while in flat bed without using bedrails? 3  -BA 3  -LN    Moving from lying on back to sitting on the side of a flat bed without bedrails? 3  -BA 3  -LN    Moving to and from a bed to a chair  (including a wheelchair)? 3  -BA 3  -LN    Standing up from a chair using your arms (e.g., wheelchair, bedside chair)? 3  -BA 3  -LN    Climbing 3-5 steps with a railing? 2  -BA 2  -LN    To walk in hospital room? 2  -BA 2  -LN    AM-PAC 6 Clicks Score (PT) 16  -BA 16  -LN    Highest level of mobility 5 --> Static standing  -BA 5 --> Static standing  -LN    Row Name 01/05/23 1413 01/05/23 1337       Functional Assessment    Outcome Measure Options AM-PAC 6 Clicks Basic Mobility (PT)  - AM-PAC 6 Clicks Daily Activity (OT)  -          User Key  (r) = Recorded By, (t) = Taken By, (c) = Cosigned By    Initials Name Provider Type    LN Sera Watson, RN Registered Nurse     Jodi Gorman, OT Occupational Therapist     Francisca Rodriguez, PT Physical Therapist                             Physical Therapy Education     Title: PT OT SLP Therapies (In Progress)     Topic: Physical Therapy (In Progress)     Point: Mobility training (In Progress)     Learning Progress Summary           Patient Acceptance, E, NR by  at 1/5/2023 1414                   Point: Home exercise program (Not Started)     Learner Progress:  Not documented in this visit.          Point: Body mechanics (In Progress)     Learning Progress Summary           Patient Acceptance, E, NR by  at 1/5/2023 1414                   Point: Precautions (In Progress)     Learning Progress Summary           Patient Acceptance, E, NR by  at 1/5/2023 1414                               User Key     Initials Effective Dates Name Provider Type Discipline     09/21/21 -  Francisca Rodriguez, PT Physical Therapist PT              PT Recommendation and Plan  Planned Therapy Interventions (PT): balance training, bed mobility training, gait training, home exercise program, motor coordination training, neuromuscular re-education, patient/family education, postural re-education, strengthening, transfer training  Plan of Care Reviewed With: patient,  sibling  Outcome Evaluation: PT initial Eval completed.  Pt presents with generalized weakness, balance impairments, visual deficits (now with complete blindness), decreased functional endurance, gait instability, and decreased indep/safety with functional mobility.  Supine to sit with minAx2.  STS x 3 reps with minAx2 and BUE support vs. FWW.  Bed to chair and chair to BSC transfer with minAx2 and BUE support.  BSC back to chair transfer with modAx2 with use of FWW.  Highly anxious throughout mobility requiring frequent VCs/TCs for reassurance, proprioception, and encouragement.  Pt warrants skilled IP PT to improve indep with mobility and return to PLOF.  Rec SNF upon d/c.     Time Calculation:    PT Charges     Row Name 01/05/23 1415             Time Calculation    Start Time 1131  -BA      PT Received On 01/05/23  -BA      PT Goal Re-Cert Due Date 01/15/23  -BA         Time Calculation- PT    Total Timed Code Minutes- PT 11 minute(s)  -BA         Timed Charges    44779 - PT Therapeutic Activity Minutes 11  -BA         Untimed Charges    PT Eval/Re-eval Minutes 62  -BA         Total Minutes    Timed Charges Total Minutes 11  -BA      Untimed Charges Total Minutes 62  -BA       Total Minutes 73  -BA            User Key  (r) = Recorded By, (t) = Taken By, (c) = Cosigned By    Initials Name Provider Type    Francisca Guerra, PT Physical Therapist              Therapy Charges for Today     Code Description Service Date Service Provider Modifiers Qty    24377008851 HC PT THERAPEUTIC ACT EA 15 MIN 1/5/2023 Francisca Rodriguez, PT GP 1    77165167588 HC PT EVAL MOD COMPLEXITY 4 1/5/2023 Francisca Rodriguez, PT GP 1          PT G-Codes  Outcome Measure Options: AM-PAC 6 Clicks Basic Mobility (PT)  AM-PAC 6 Clicks Score (PT): 16  AM-PAC 6 Clicks Score (OT): 14  PT Discharge Summary  Anticipated Discharge Disposition (PT): skilled nursing facility    Francisca Rodriguez PT  1/5/2023      Electronically signed by Al  Francisca Case, PT at 23 1416          Occupational Therapy Notes (most recent note)      Jodi Gorman, OT at 23 1130          Patient Name: Olu Henriquez  : 1950    MRN: [id number]                              Today's Date: 2023       Admit Date: 2023    Visit Dx:     ICD-10-CM ICD-9-CM   1. Acute respiratory failure with hypoxia (HCC)  J96.01 518.81   2. Pneumonia due to infectious organism, unspecified laterality, unspecified part of lung  J18.9 486   3. Sepsis without acute organ dysfunction, due to unspecified organism (HCC)  A41.9 038.9     995.91   4. Hypokalemia  E87.6 276.8   5. 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HCC)   • Medicare annual wellness visit, subsequent   • Vitamin D deficiency   • Acute respiratory failure with hypoxia (HCC)   • CAP (community acquired pneumonia)   • Sepsis, unspecified organism (HCC)   • Moderate dehydration   • Hypokalemia   • Hypomagnesemia   • Physical deconditioning     Past Medical History:   Diagnosis Date   • Alcoholic (HCC)    • Anxiety    • Blindness    • Concussion    • Depression    • Essential tremor    • Hypertension    • Pneumonia 2019   • Shingles 1965     Past Surgical History:   Procedure Laterality Date   • CHEST SURGERY  2009    Chest clean out aftrer PNA with multiple chest tubes   • EYE SURGERY     • TONSILLECTOMY        General Information     Row Name 23 1323          OT Time and Intention    Document Type evaluation  -     Mode of Treatment occupational therapy  -     Row Name 23 1323          General Information    Patient Profile Reviewed yes  -HM     Prior Level of Function independent:;all household mobility;community mobility;gait;transfer;bed mobility;ADL's  Prior to complete deterioration of vision. Reports 10+ falls recently.  -     Existing Precautions/Restrictions fall;oxygen therapy  device and L/min;seizures;other (see comments)  tremors; blind; bowel and bladder incontinence; anxiety  -     Barriers to Rehab visual deficit;ineffective coping   -     Row Name 01/05/23 1323          Occupational Profile    Environmental Supports and Barriers (Occupational Profile) Pt is very anxious and fearful of all movement due to poor vision.  -     Row Name 01/05/23 1323          Living Environment    People in Home alone  -     Row Name 01/05/23 1323          Home Main Entrance    Number of Stairs, Main Entrance one  -HM     Stair Railings, Main Entrance none  -HM     Row Name 01/05/23 1323          Stairs Within Home, Primary    Number of Stairs, Within Home, Primary none  -HM     Stair Railings, Within Home, Primary none  -HM     Row Name 01/05/23 1323          Cognition    Orientation Status (Cognition) oriented x 4  -     Row Name 01/05/23 1323          Safety Issues, Functional Mobility    Safety Issues Affecting Function (Mobility) safety precautions follow-through/compliance;safety precaution awareness;insight into deficits/self-awareness;awareness of need for assistance;friction/shear risk;impulsivity;judgment;at risk behavior observed;ability to follow commands;problem-solving;sequencing abilities  -     Impairments Affecting Function (Mobility) balance;coordination;endurance/activity tolerance;motor control;motor planning;pain;postural/trunk control;sensation/sensory awareness;shortness of breath;strength;visual/perceptual;cognition  -     Cognitive Impairments, Mobility Safety/Performance attention;awareness, need for assistance;insight into deficits/self-awareness;judgment;problem-solving/reasoning;sequencing abilities;safety precaution follow-through;safety precaution awareness;impulsivity  -           User Key  (r) = Recorded By, (t) = Taken By, (c) = Cosigned By    Initials Name Provider Type     Jodi Gorman OT Occupational Therapist                 Mobility/ADL's      Row Name 01/05/23 1326          Bed Mobility    Bed Mobility supine-sit;scooting/bridging  -     Scooting/Bridging [place] (Bed Mobility) contact guard;verbal cues;nonverbal cues (demo/gesture)  -     Supine-Sit [place] (Bed Mobility) minimum assist (75% patient effort);2 person assist;verbal cues;nonverbal cues (demo/gesture)  -     Assistive Device (Bed Mobility) bed rails;head of bed elevated  -     Comment, (Bed Mobility) Slightly impulsive. Does not allow time for verbal cues and then becomes frustrated that cues are not provided. Pt continues to adjust to low vision and requiers encouragement.  -     Row Name 01/05/23 1326          Transfers    Transfers sit-stand transfer;toilet transfer  -     Row Name 01/05/23 1326          Bed-Chair Transfer    Bed-Chair [place] (Transfers) minimum assist (75% patient effort);2 person assist;verbal cues  -     Assistive Device (Bed-Chair Transfers) other (see comments)  BUE support  -     Row Name 01/05/23 1326          Sit-Stand Transfer    Sit-Stand [place] (Transfers) minimum assist (75% patient effort);2 person assist;verbal cues  -     Assistive Device (Sit-Stand Transfers) other (see comments)  UE support  -     Comment, (Sit-Stand Transfer) Pt stands impulsively.  -     Row Name 01/05/23 1326          Toilet Transfer    [place] Level (Toilet Transfer) 2 person assist;verbal cues;moderate assist (50% patient effort)  -     Assistive Device (Toilet Transfer) other (see comments);walker, front-wheeled  UE support for chair to BSC. used RW for balance during lucia care.  -     Row Name 01/05/23 1326          Functional Mobility    Functional Mobility- Ind. Level not appropriate to assess  -     Row Name 01/05/23 1326          Activities of Daily Living    BADL Assessment/Intervention lower body dressing;toileting;feeding  -     Row Name 01/05/23 1326          Lower Body Dressing Assessment/Training    [place]  Level (Lower Body Dressing) dependent (less than 25% patient effort);lower body dressing skills  -     Row Name 01/05/23 1326          Toileting Assessment/Training    [place] Level (Toileting) maximum assist (25% patient effort);perform perineal hygiene  -     Position (Toileting) supported standing  -     Row Name 01/05/23 1326          Self-Feeding Assessment/Training    [place] Level (Feeding) prepare tray/open items;dependent (less than 25% patient effort);scoop food and bring to mouth;liquids to mouth;verbal cues;nonverbal cues (demo/gesture)  -     Position (Self-Feeding) supported sitting  -     Comment, (Feeding) Pt requires setup and extensive cues for all self feeding. Pt intially reporting he was unable to complete. Pt educated on food placement using clock idenfiers for placement. Pt with good self feeding this date and minimal spillage. Will require encouragement to complete. Extensive time/effort/cues for completion.  -           User Key  (r) = Recorded By, (t) = Taken By, (c) = Cosigned By    Initials Name Provider Type     Jodi Gorman, OT Occupational Therapist               Obj/Interventions     Row Name 01/05/23 1330          Sensory Assessment (Somatosensory)    Sensory Assessment (Somatosensory) bilateral UE  -     Bilateral UE Sensory Assessment light touch awareness;impaired  -     Sensory Subjective Reports tingling;numbness  -     Sensory Assessment poor sensation in hands bilaterally. Reports numbness and tingling.  -     Row Name 01/05/23 1330          Vision Assessment/Intervention    Visual Impairment/Limitations legally blind;visual/perceptual impairments present;other (see comments)  blind  -     Row Name 01/05/23 1330          Range of Motion Comprehensive    General Range of Motion no range of motion deficits identified  -     Comment, General Range of Motion BUE AROM WFL  -     Row Name 01/05/23 1330          Strength Comprehensive (MMT)     General Manual Muscle Testing (MMT) Assessment upper extremity strength deficits identified  -     Comment, General Manual Muscle Testing (MMT) Assessment generally weak  -     Row Name 01/05/23 1330          Upper Extremity (Manual Muscle Testing)    Upper Extremity: Manual Muscle Testing (MMT) right shoulder strength deficit;left shoulder strength deficit  -     Row Name 01/05/23 1330          Motor Skills    Motor Skills coordination;functional endurance  -     Coordination fine motor deficit;gross motor deficit;bilateral;upper extremity;moderate impairment  -     Functional Endurance decreased  -     Row Name 01/05/23 1330          Balance    Balance Assessment sitting static balance;sitting dynamic balance;standing static balance;standing dynamic balance  -     Static Sitting Balance supervision;verbal cues  -     Dynamic Sitting Balance contact guard;verbal cues  -     Position, Sitting Balance unsupported;sitting edge of bed  -     Static Standing Balance minimal assist  -     Dynamic Standing Balance moderate assist  -     Position/Device Used, Standing Balance supported;walker, rolling  -     Balance Interventions sitting;occupation based/functional task  -     Row Name 01/05/23 1330          Right Shoulder (Manual Muscle Testing)    Right Shoulder Manual Muscle Testing (MMT) flexion  -HM     MMT: Flexion, Right Shoulder flexion  -     MMT, Gross Movement: Right Shoulder Flexion (4/5) good  -HM     Row Name 01/05/23 1330          Left Shoulder (Manual Muscle Testing)    Left Shoulder Manual Muscle Testing (MMT) flexion  -     MMT: Flexion, Left Shoulder flexion  -     MMT, Gross Movement: Left Shoulder Flexion (4-/5) good minus  -           User Key  (r) = Recorded By, (t) = Taken By, (c) = Cosigned By    Initials Name Provider Type     Jodi Gorman, OT Occupational Therapist               Goals/Plan     Row Name 01/05/23 1339          Bed Mobility Goal 1 (OT)     Activity/Assistive Device (Bed Mobility Goal 1, OT) sit to supine/supine to sit;scooting  -     [place] Level/Cues Needed (Bed Mobility Goal 1, OT) modified independence  -HM     Time Frame (Bed Mobility Goal 1, OT) by discharge;long term goal (LTG)  -HM     Progress/Outcomes (Bed Mobility Goal 1, OT) goal [facility]     Row Name 01/05/23 8787          Transfer Goal 1 (OT)    Activity/Assistive Device (Transfer Goal 1, OT) sit-to-stand/stand-to-sit;bed-to-chair/chair-to-bed;toilet  -     [place] Level/Cues Needed (Transfer Goal 1, OT) minimum assist (75% or more patient effort);verbal cues required  -HM     Time Frame (Transfer Goal 1, OT) by discharge;long term goal (LTG)  -HM     Progress/Outcome (Transfer Goal 1, OT) goal [facility]     Row Name 01/05/23 1339          Toileting Goal 1 (OT)    Activity/Device (Toileting Goal 1, OT) adjust/manage clothing;perform perineal hygiene  -     [place] Level/Cues Needed (Toileting Goal 1, OT) moderate assist (50-74% patient effort);verbal cues required  -HM     Time Frame (Toileting Goal 1, OT) by discharge;long term goal (LTG)  -HM     Progress/Outcome (Toileting Goal 1, OT) goal [facility]     Row Name 01/05/23 9807          Grooming Goal 1 (OT)    Activity/Device (Grooming Goal 1, OT) hair care;oral care;wash face, hands  -     [place] (Grooming Goal 1, OT) moderate assist (50-74% patient effort);verbal cues required  -HM     Time Frame (Grooming Goal 1, OT) by discharge;long term goal (LTG)  -HM     Progress/Outcome (Grooming Goal 1, OT) goal ongoing  [facility]     Row Name 01/05/23 9427          Therapy Assessment/Plan (OT)    Planned Therapy Interventions (OT) adaptive equipment training;BADL retraining;functional balance retraining;occupation/activity based interventions;ROM/therapeutic exercise;transfer/mobility retraining  -           User Key  (r) = Recorded By, (t) = Taken By, (c) = Cosigned By    Initials Name Provider Type     " Jodi Gorman, OT Occupational Therapist               Clinical Impression     Row Name 01/05/23 1332          Pain Assessment    Pretreatment Pain Rating 0/10 - no pain  -     Posttreatment Pain Rating 0/10 - no pain  -     Row Name 01/05/23 1332          Plan of Care Review    Plan of Care Reviewed With patient  -     Progress improving  -     Outcome Evaluation OT eval complete. Pt is pleasent and cooperative. Difficuly adjusting from low vision to now no vision. Pt is anxious and fearful during any mobility due to being unaware of surroundings. Pt requires extensive time/effort and cues. Completed bed mobility with minAx2 and transfers with minAx2 and UE support. Attempted use of RW to BSC however pt modAx2 with use of RW. More fearful with RW.  Pt required maxA for all lucia care and expect dependence for all dressing and bathing. Pt has been dependent for all self feeding since admit. Required encouragement to attempt due to pt stating it is just \"easier for someone to help me\". Pt educated on need to complete self feeding and why. Pt completed all self feeding of lunch with set up from OT and extensive verbal cues for placement of food on tray table and reminders of where things on table are. Recommend d/c to SNF.  -     Row Name 01/05/23 8167          Therapy Assessment/Plan (OT)    Patient/Family Therapy Goal Statement (OT) Pt would like to improve and return home.  -     Rehab Potential (OT) good, to achieve stated therapy goals  -     Criteria for Skilled Therapeutic Interventions Met (OT) yes;skilled treatment is necessary  -     Therapy Frequency (OT) daily  -     Row Name 01/05/23 8470          Therapy Plan Review/Discharge Plan (OT)    Anticipated Discharge Disposition (OT) skilled nursing facility  -     Row Name 01/05/23 1332          Vital Signs    Pre Systolic BP Rehab --  RN cleared for tx; VSS  -     O2 Delivery Pre Treatment supplemental O2  -     O2 Delivery " Intra Treatment supplemental O2  -HM     O2 Delivery Post Treatment supplemental O2  -HM     Pre Patient Position Supine  -HM     Intra Patient Position Standing  -HM     Post Patient Position Sitting  -HM     Row Name 01/05/23 1332          Positioning and Restraints    Pre-Treatment Position in bed  -HM     Post Treatment Position chair  -HM     In Chair notified nsg;reclined;call light within reach;encouraged to call for assist;exit alarm on;waffle cushion  -           User Key  (r) = Recorded By, (t) = Taken By, (c) = Cosigned By    Initials Name Provider Type     Jodi Gorman, OT Occupational Therapist               Outcome Measures     Row Name 01/05/23 1337          How much help from another is currently needed...    Putting on and taking off regular lower body clothing? 1  -HM     Bathing (including washing, rinsing, and drying) 2  -HM     Toileting (which includes using toilet bed pan or urinal) 2  -HM     Putting on and taking off regular upper body clothing 3  -HM     Taking care of personal grooming (such as brushing teeth) 3  -HM     Eating meals 3  -HM     AM-PAC 6 Clicks Score (OT) 14  -HM     Row Name 01/05/23 0800          How much help from another person do you currently need...    Turning from your back to your side while in flat bed without using bedrails? 3  -LN     Moving from lying on back to sitting on the side of a flat bed without bedrails? 3  -LN     Moving to and from a bed to a chair (including a wheelchair)? 3  -LN     Standing up from a chair using your arms (e.g., wheelchair, bedside chair)? 3  -LN     Climbing 3-5 steps with a railing? 2  -LN     To walk in hospital room? 2  -LN     AM-PAC 6 Clicks Score (PT) 16  -LN     Highest level of mobility 5 --> Static standing  -LN     Row Name 01/05/23 1337          Functional Assessment    Outcome Measure Options AM-PAC 6 Clicks Daily Activity (OT)  -           User Key  (r) = Recorded By, (t) = Taken By, (c) = Cosigned By     Initials Name Provider Type    LN Sera Watson, RN Registered Nurse     Jodi Gorman OT Occupational Therapist                Occupational Therapy Education     Title: PT OT SLP Therapies (In Progress)     Topic: Occupational Therapy (In Progress)     Point: ADL training (Done)     Description:   Instruct learner(s) on proper safety adaptation and remediation techniques during self care or transfers.   Instruct in proper use of assistive devices.              Learning Progress Summary           Patient Acceptance, E,TB,D, VU,NR by  at 1/5/2023 1339                   Point: Home exercise program (Not Started)     Description:   Instruct learner(s) on appropriate technique for monitoring, assisting and/or progressing therapeutic exercises/activities.              Learner Progress:  Not documented in this visit.          Point: Precautions (Done)     Description:   Instruct learner(s) on prescribed precautions during self-care and functional transfers.              Learning Progress Summary           Patient Acceptance, E,TB,D, VU,NR by  at 1/5/2023 1339                   Point: Body mechanics (Done)     Description:   Instruct learner(s) on proper positioning and spine alignment during self-care, functional mobility activities and/or exercises.              Learning Progress Summary           Patient Acceptance, E,TB,D, VU,NR by  at 1/5/2023 1339                               User Key     Initials Effective Dates Name Provider Type Discipline     10/25/22 -  Jodi Gorman OT Occupational Therapist OT              OT Recommendation and Plan  Planned Therapy Interventions (OT): adaptive equipment training, BADL retraining, functional balance retraining, occupation/activity based interventions, ROM/therapeutic exercise, transfer/mobility retraining  Therapy Frequency (OT): daily  Plan of Care Review  Plan of Care Reviewed With: patient  Progress: improving  Outcome Evaluation: OT claude complete.  "Pt is pleasent and cooperative. Difficuly adjusting from low vision to now no vision. Pt is anxious and fearful during any mobility due to being unaware of surroundings. Pt requires extensive time/effort and cues. Completed bed mobility with minAx2 and transfers with minAx2 and UE support. Attempted use of RW to BSC however pt modAx2 with use of RW. More fearful with RW.  Pt required maxA for all lucia care and expect dependence for all dressing and bathing. Pt has been dependent for all self feeding since admit. Required encouragement to attempt due to pt stating it is just \"easier for someone to help me\". Pt educated on need to complete self feeding and why. Pt completed all self feeding of lunch with set up from OT and extensive verbal cues for placement of food on tray table and reminders of where things on table are. Recommend d/c to SNF.     Time Calculation:    Time Calculation- OT     Row Name 01/05/23 1130             Time Calculation- OT    OT Start Time 1130  -HM      OT Received On 01/05/23  -HM      OT Goal Re-Cert Due Date 01/15/23  -HM         Timed Charges    12450 - OT Self Care/Mgmt Minutes 25  -HM         Untimed Charges    OT Eval/Re-eval Minutes 60  -HM         Total Minutes    Timed Charges Total Minutes 25  -HM      Untimed Charges Total Minutes 60  -HM       Total Minutes 85  -HM            User Key  (r) = Recorded By, (t) = Taken By, (c) = Cosigned By    Initials Name Provider Type     Jodi Gorman OT Occupational Therapist              Therapy Charges for Today     Code Description Service Date Service Provider Modifiers Qty    08064117231  OT SELF CARE/MGMT/TRAIN EA 15 MIN 1/5/2023 Jodi Gorman OT GO 2    [id number]  OT EVAL HIGH COMPLEXITY 4 1/5/2023 Jodi Gorman OT GO 1               Jodi Gorman OT  1/5/2023    Electronically signed by Jodi Gorman OT at 01/05/23 1343       "

## 2023-01-09 NOTE — CASE MANAGEMENT/SOCIAL WORK
Discharge Planning Assessment  [facility]     Patient Name: Olu Henriquez  MRN: [id number]  Today's Date: 1/9/2023    Admit Date: 1/4/2023    Plan: SNF   Discharge Needs Assessment    No documentation.                Discharge Plan     Row Name 01/09/23 1130       Plan    Plan SNF    Patient/Family in Agreement with Plan yes    Plan Comments Spoke with pt's brother, Jamison, (with pt permission) via phone to f/u on [facility] options.  Jamison advised that pt does not have the financial means for skilled nursing.  As such, pt cannot continue to safely live alone and will require LTC in a nursing facility.  Pt understands and is agreeable.  CM has started the search for a rehab to LTC bed as outlined below.  Will cont to follow.    Final Discharge Disposition Code 03 - skilled nursing facility (SNF)              Continued Care and Services - Admitted Since 1/4/2023     Destination     Service Provider Request Status Selected Services Address Phone Fax Patient Preferred    [facility] Pending - Request Sent N/A [address] [place] [id number] [phone] --    [facility] Pending - No Request Sent N/A [address], [place] [id number] [phone] --    [facility] Pending - No Request Sent N/A [address] [place] [id number] [phone] --    [facility] Pending - No Request Sent N/A [address], [place] [id number] [phone] --    [facility] P[facility] - No [facility] Sent N/A [address] [facility] [id number] [phone] --    [facility] Pending - No Request Sent N/A [address] [place] [id number] [phone] --    [facility] Pending - No Request Sent N/A [address], [place] [id number] [phone] --              Expected Discharge Date and Time     Expected Discharge  Date Expected Discharge Time    Jan 11, 2023          Demographic Summary    No documentation.                Functional Status    No documentation.                Psychosocial    No documentation.                Abuse/Neglect    No documentation.                Legal    No documentation.                Substance Abuse    No documentation.                Patient Forms    No documentation.                   Linnea Reeder RN

## 2023-01-09 NOTE — THERAPY TREATMENT NOTE
Patient Name: Olu Henriquez  : 1950    MRN: [id number]                              Today's Date: 2023       Admit Date: 2023    Visit Dx:     ICD-10-CM ICD-9-CM   1. Acute respiratory failure with hypoxia (HCC)  J96.01 518.81   2. Pneumonia due to infectious organism, unspecified laterality, unspecified part of lung  J18.9 486   3. Sepsis without acute organ dysfunction, due to unspecified organism (HCC)  A41.9 038.9     995.91   4. Hypokalemia  E87.6 276.8   5. 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HCC)   • Medicare annual wellness visit, subsequent   • Vitamin D deficiency   • Acute respiratory failure with hypoxia (HCC)   • CAP (community acquired pneumonia)   • Sepsis, unspecified organism (HCC)   • Moderate dehydration   • Hypokalemia   • Hypomagnesemia   • Physical deconditioning   • Atrial flutter with controlled response (HCC)     Past Medical History:   Diagnosis Date   • Alcoholic (HCC)    • Anxiety    • Blindness    • Concussion    • Depression    • Essential tremor    • Hypertension    • Pneumonia    • Shingles 1965     Past Surgical History:   Procedure Laterality Date   • CHEST SURGERY      Chest clean out aftrer PNA with multiple chest tubes   • EYE SURGERY     • TONSILLECTOMY        General Information     Row Name 23 1415          Physical Therapy Time and Intention    Mode of Treatment physical therapy;co-treatment  -     Row Name 23 1415          General Information    Patient Profile Reviewed yes  -KW     Existing Precautions/Restrictions fall;oxygen therapy device and L/min;seizures;other (see comments)  blind, high anxiety  -KW     Row Name 23 1415          Safety Issues, Functional Mobility    Impairments Affecting Function (Mobility) balance;coordination;endurance/activity tolerance;motor control;motor planning;pain;postural/trunk  control;sensation/sensory awareness;shortness of breath;strength;visual/perceptual;cognition  -KW           User Key  (r) = Recorded By, (t) = Taken By, (c) = Cosigned By    Initials Name Provider Type    Yolande Ryan PT Physical Therapist               Mobility     Row Name 01/09/23 1415          Bed Mobility    Bed Mobility scooting/bridging;rolling left;rolling right;supine-sit;sit-supine  -KW     Rolling Left [place] (Bed Mobility) minimum assist (75% patient effort);2 person assist;nonverbal cues (demo/gesture);verbal cues  -KW     Rolling Right [place] (Bed Mobility) minimum assist (75% patient effort);2 person assist;verbal cues;nonverbal cues (demo/gesture)  -KW     Scooting/Bridging [place] (Bed Mobility) minimum assist (75% patient effort);nonverbal cues (demo/gesture);verbal cues  -KW     Supine-Sit [place] (Bed Mobility) minimum assist (75% patient effort);2 person assist;nonverbal cues (demo/gesture);verbal cues  -KW     Sit-Supine [place] (Bed Mobility) minimum assist (75% patient effort);2 person assist;nonverbal cues (demo/gesture);verbal cues  -KW     Assistive Device (Bed Mobility) bed rails;draw sheet;head of bed elevated  -KW           User Key  (r) = Recorded By, (t) = Taken By, (c) = Cosigned By    Initials Name Provider Type    Yolande Ryan PT Physical Therapist               Obj/Interventions    No documentation.                Goals/Plan    No documentation.                Clinical Impression     Row Name 01/09/23 1415          Pain    Pre/Posttreatment Pain Comment Patient without complaints throughout PTx  -KW     Row Name 01/09/23 1415          Plan of Care Review    Plan of Care Reviewed With patient  -KW     Progress improving  -KW     Outcome Evaluation Patient rolled L and R for use of bedpan and clean up. Patient agreeable to sit to stands however demonstrates very high anxiety requiring extended time for detailed explaination prior to  movement/ activity. Patient able to demonstrate improved standing tolerance today. He declined chair. PT reccomends SNF at d/c  -KW     Row Name 01/09/23 1415          Positioning and Restraints    Pre-Treatment Position in bed  -KW     Post Treatment Position bed  -KW     In Bed notified nsg;supine;encouraged to call for assist;call light within reach;exit alarm on  -KW           User Key  (r) = Recorded By, (t) = Taken By, (c) = Cosigned By    Initials Name Provider Type    Yolande Ryan PT Physical Therapist               Outcome Measures     Row Name 01/09/23 1415          How much help from another person do you currently need...    Turning from your back to your side while in flat bed without using bedrails? 3  -KW     Moving from lying on back to sitting on the side of a flat bed without bedrails? 2  -KW     Moving to and from a bed to a chair (including a wheelchair)? 2  -KW     Standing up from a chair using your arms (e.g., wheelchair, bedside chair)? 2  -KW     Climbing 3-5 steps with a railing? 1  -KW     To walk in hospital room? 1  -KW     AM-PAC 6 Clicks Score (PT) 11  -KW     Highest level of mobility 4 --> Transferred to chair/commode  -KW     Row Name 01/09/23 1502 01/09/23 1415       Functional Assessment    Outcome Measure Options AM-PAC 6 Clicks Daily Activity (OT)  -KF AM-PAC 6 Clicks Basic Mobility (PT)  -KW          User Key  (r) = Recorded By, (t) = Taken By, (c) = Cosigned By    Initials Name Provider Type    Miri Esteves OT Occupational Therapist    Yolande Ryan, URSZULA Physical Therapist                             Physical Therapy Education     Title: PT OT SLP Therapies (Done)     Topic: Physical Therapy (Done)     Point: Mobility training (Done)     Learning Progress Summary           Patient Acceptance, E,TB, VU by NAZIA at 1/9/2023 1415    Comment: continued benefit of skilled PT services    Acceptance, E, VU by DESTINI at 1/8/2023 2044    Acceptance, E, VU by DESTINI  at 1/8/2023 0257    Acceptance, E, VU by KM at 1/8/2023 0256    Acceptance, E, VU by KM at 1/8/2023 0255    Acceptance, E, NR by BA at 1/5/2023 1414                   Point: Home exercise program (Done)     Learning Progress Summary           Patient Acceptance, E,TB, VU by KW at 1/9/2023 1415    Comment: continued benefit of skilled PT services    Acceptance, E, VU by KM at 1/8/2023 2044    Acceptance, E, VU by KM at 1/8/2023 0257    Acceptance, E, VU by KM at 1/8/2023 0256    Acceptance, E, VU by KM at 1/8/2023 0255                   Point: Body mechanics (Done)     Learning Progress Summary           Patient Acceptance, E,TB, VU by KW at 1/9/2023 1415    Comment: continued benefit of skilled PT services    Acceptance, E, VU by KM at 1/8/2023 2044    Acceptance, E, VU by KM at 1/8/2023 0257    Acceptance, E, VU by KM at 1/8/2023 0256    Acceptance, E, VU by KM at 1/8/2023 0255    Acceptance, E, NR by BA at 1/5/2023 1414                   Point: Precautions (Done)     Learning Progress Summary           Patient Acceptance, E,TB, VU by KW at 1/9/2023 1415    Comment: continued benefit of skilled PT services    Acceptance, E, VU by KM at 1/8/2023 2044    Acceptance, E, VU by KM at 1/8/2023 0257    Acceptance, E, VU by KM at 1/8/2023 0256    Acceptance, E, VU by KM at 1/8/2023 0255    Acceptance, E, NR by BA at 1/5/2023 1414                               User Key     Initials Effective Dates Name Provider Type Discipline     06/16/21 -  Lilli Calzada, RN Registered Nurse Nurse    BA 09/21/21 -  Francisca Rodriguez, PT Physical Therapist PT     01/27/22 -  Yolande Smith, PT Physical Therapist PT              PT Recommendation and Plan     Plan of Care Reviewed With: patient  Progress: improving  Outcome Evaluation: Patient rolled L and R for use of bedpan and clean up. Patient agreeable to sit to stands however demonstrates very high anxiety requiring extended time for detailed explaination prior to  movement/ activity. Patient able to demonstrate improved standing tolerance today. He declined chair. PT reccomends SNF at d/c     Time Calculation:    PT Charges     Row Name 01/09/23 1415             Time Calculation    Start Time 1415  -KW      PT Received On 01/09/23  -KW      PT Goal Re-Cert Due Date 01/15/23  -KW         Timed Charges    44424 - PT Therapeutic Activity Minutes 29  -KW         Total Minutes    Timed Charges Total Minutes 29  -KW       Total Minutes 29  -KW            User Key  (r) = Recorded By, (t) = Taken By, (c) = Cosigned By    Initials Name Provider Type    Yolande Ryan PT Physical Therapist              Therapy Charges for Today     Code Description Service Date Service Provider Modifiers Qty    68386178797 HC PT THERAPEUTIC ACT EA 15 MIN 1/9/2023 Yolande Smith PT GP 2          PT G-Codes  Outcome Measure Options: AM-PAC 6 Clicks Daily Activity (OT)  AM-PAC 6 Clicks Score (PT): 11  AM-PAC 6 Clicks Score (OT): 14  PT Discharge Summary  Anticipated Discharge Disposition (PT): skilled nursing facility    Yolande Smith PT  1/9/2023

## 2023-01-09 NOTE — THERAPY TREATMENT NOTE
Patient Name: Olu Henriquez  : 1950    MRN: [id number]                              Today's Date: 2023       Admit Date: 2023    Visit Dx:     ICD-10-CM ICD-9-CM   1. Acute respiratory failure with hypoxia (HCC)  J96.01 518.81   2. Pneumonia due to infectious organism, unspecified laterality, unspecified part of lung  J18.9 486   3. Sepsis without acute organ dysfunction, due to unspecified organism (HCC)  A41.9 038.9     995.91   4. Hypokalemia  E87.6 276.8   5. 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HCC)   • Medicare annual wellness visit, subsequent   • Vitamin D deficiency   • Acute respiratory failure with hypoxia (HCC)   • CAP (community acquired pneumonia)   • Sepsis, unspecified organism (HCC)   • Moderate dehydration   • Hypokalemia   • Hypomagnesemia   • Physical deconditioning   • Atrial flutter with controlled response (HCC)     Past Medical History:   Diagnosis Date   • Alcoholic (HCC)    • Anxiety    • Blindness    • Concussion    • Depression    • Essential tremor    • Hypertension    • Pneumonia    • Shingles 1965     Past Surgical History:   Procedure Laterality Date   • CHEST SURGERY      Chest clean out aftrer PNA with multiple chest tubes   • EYE SURGERY     • TONSILLECTOMY        General Information     Row Name 23 1449          OT Time and Intention    Document Type therapy note (daily note)  -KF     Mode of Treatment occupational therapy;co-treatment  -KF     Row Name 23 1449          General Information    Patient Profile Reviewed yes  -KF     Existing Precautions/Restrictions fall;oxygen therapy device and L/min;seizures;other (see comments)  blind, highly anxious  -KF     Barriers to Rehab visual deficit;ineffective coping;medically complex  -KF     Row Name 23 1449          Cognition    Orientation Status (Cognition) oriented x 4  -KF     Row Name  01/09/23 1449          Safety Issues, Functional Mobility    Safety Issues Affecting Function (Mobility) insight into deficits/self-awareness;awareness of need for assistance;safety precaution awareness  -KF     Impairments Affecting Function (Mobility) balance;coordination;endurance/activity tolerance;motor control;motor planning;pain;postural/trunk control;sensation/sensory awareness;shortness of breath;strength;visual/perceptual;cognition  -KF     Cognitive Impairments, Mobility Safety/Performance awareness, need for assistance;insight into deficits/self-awareness;problem-solving/reasoning  -KF     Comment, Safety Issues/Impairments (Mobility) requires extended time and constant cues for direction  -KF           User Key  (r) = Recorded By, (t) = Taken By, (c) = Cosigned By    Initials Name Provider Type    Miri Esteves OT Occupational Therapist                 Mobility/ADL's     Row Name 01/09/23 1452          Bed Mobility    Bed Mobility scooting/bridging;rolling left;rolling right;supine-sit;sit-supine  -KF     Rolling Left [place] (Bed Mobility) minimum assist (75% patient effort);2 person assist;nonverbal cues (demo/gesture);verbal cues  -KF     Rolling Right [place] (Bed Mobility) minimum assist (75% patient effort);2 person assist;verbal cues;nonverbal cues (demo/gesture)  -KF     Scooting/Bridging [place] (Bed Mobility) minimum assist (75% patient effort);nonverbal cues (demo/gesture);verbal cues  -KF     Supine-Sit [place] (Bed Mobility) minimum assist (75% patient effort);2 person assist;nonverbal cues (demo/gesture);verbal cues  -KF     Sit-Supine [place] (Bed Mobility) minimum assist (75% patient effort);2 person assist;nonverbal cues (demo/gesture);verbal cues  -KF     Bed Mobility, Safety Issues decreased use of arms for pushing/pulling;decreased use of legs for bridging/pushing  -KF     Assistive Device (Bed Mobility) bed rails;draw sheet;head of bed elevated  -KF      Comment, (Bed Mobility) cues for sequencing throughout with tactile cues for hand  guidance throughout all tasks  -     Row Name 01/09/23 1452          Transfers    Transfers sit-stand transfer;stand-sit transfer  -     Comment, (Transfers) STS x2 reps with max cues for sequencing; Pt declined SPT practice today  -     Row Name 01/09/23 1452          Sit-Stand Transfer    Sit-Stand [place] (Transfers) minimum assist (75% patient effort);2 person assist;verbal cues  -     Assistive Device (Sit-Stand Transfers) walker, front-wheeled  -     Comment, (Sit-Stand Transfer) moments CGA unsteady throughout progressing standing balance with ws TA  -     Row Name 01/09/23 1452          Stand-Sit Transfer    Stand-Sit [place] (Transfers) minimum assist (75% patient effort);2 person assist  -     Assistive Device (Stand-Sit Transfers) walker, front-wheeled  -     Row Name 01/09/23 1452          Functional Mobility    Functional Mobility- Comment TA completed weightshifting MIP 5 reps BUE at FWW to progress  -     Row Name 01/09/23 1452          Activities of Daily Living    BADL Assessment/Intervention toileting  -     Row Name 01/09/23 1452          Toileting Assessment/Training    [place] Level (Toileting) adjust/manage clothing;perform perineal hygiene;change pad/brief;dependent (less than 25% patient effort)  -     Assistive Devices (Toileting) bedpan  -     Position (Toileting) supine  -     Comment, (Toileting) declined attempt at [facility], agreeable for bedpan  -     Row Name 01/09/23 [facility]          Self-Feeding Assessment/Training    Assistive Devices (Feeding) built-up handle utensils;scoop dish/plate guard  -     Comment, (Feeding) AE provided to progress for I and education for future session  -           User Key  (r) = Recorded By, (t) = Taken By, (c) = Cosigned By    Initials Name Provider Type    KF Miri Garcia, OT Occupational Therapist                Obj/Interventions     Row Name 01/09/23 1456          Vision Assessment/Intervention    Visual Impairment/Limitations legally blind;visual/perceptual impairments present;other (see comments)  -KF     Row Name 01/09/23 1456          Balance    Balance Assessment sitting static balance;sitting dynamic balance;standing static balance;standing dynamic balance  -KF     Static Sitting Balance standby assist;verbal cues;non-verbal cues (demo/gesture)  -KF     Dynamic Sitting Balance contact guard;verbal cues;non-verbal cues (demo/gesture)  -KF     Position, Sitting Balance unsupported;sitting edge of bed  -KF     Static Standing Balance minimal assist;2-person assist;non-verbal cues (demo/gesture);verbal cues  -KF     Dynamic Standing Balance minimal assist;2-person assist;verbal cues;non-verbal cues (demo/gesture)  -KF     Position/Device Used, Standing Balance supported;walker, front-wheeled  -KF     Balance Interventions standing;weight shifting activity  -KF           User Key  (r) = Recorded By, (t) = Taken By, (c) = Cosigned By    Initials Name Provider Type    KF Miri Garcia, OT Occupational Therapist               Goals/Plan    No documentation.                Clinical Impression     Row Name 01/09/23 1500          Pain Assessment    Additional Documentation Pain Scale: FACES Pre/Post-Treatment (Group)  -KF     Row Name 01/09/23 1500          Pain Scale: FACES Pre/Post-Treatment    Pain: FACES Scale, Pretreatment 0-->no hurt  -KF     Posttreatment Pain Rating 0-->no hurt  -KF     Pre/Posttreatment Pain Comment tolerated  -KF     Row Name 01/09/23 1500          Plan of Care Review    Plan of Care Reviewed With patient  -KF     Progress improving  -     Outcome Evaluation Pt participated in standing TA to progress weightshifting for functional transfer progression such as use of BSC, required use of bedpan at this time for toileting completion depA, Ray rolling, Ray bed mob with max cues for sequencing  including tactile cues throughout for guidance, Ray x2 STS at FWW, cont IPOT per POC recom SNF at d/c  -KF     Row Name 01/09/23 1500          Therapy Assessment/Plan (OT)    Rehab Potential (OT) good, to achieve stated therapy goals  -     Criteria for Skilled Therapeutic Interventions Met (OT) yes;skilled treatment is necessary  -KF     Therapy Frequency (OT) daily  -KF     Row Name 01/09/23 1500          Therapy Plan Review/Discharge Plan (OT)    Anticipated Discharge Disposition (OT) skilled nursing facility  -     Row Name 01/09/23 1500          Vital Signs    Pre Systolic BP Rehab --  RN cleared VSS  -KF     Pre SpO2 (%) 96  -KF     O2 Delivery Pre Treatment supplemental O2  -KF     Pre Patient Position Supine  -KF     Intra Patient Position Standing  -KF     Post Patient Position Supine  -KF     Rest Breaks  2  -KF     Row Name 01/09/23 1500          Positioning and Restraints    Pre-Treatment Position in bed  -KF     Post Treatment Position bed  -KF     In Bed notified nsg;supine;fowlers;call light within reach;encouraged to call for assist;exit alarm on;legs elevated;with other staff  -KF           User Key  (r) = Recorded By, (t) = Taken By, (c) = Cosigned By    Initials Name Provider Type    KF Miri Garcia, OT Occupational Therapist               Outcome Measures     Row Name 01/09/23 1502          How much help from another is currently needed...    Putting on and taking off regular lower body clothing? 1  -KF     Bathing (including washing, rinsing, and drying) 2  -KF     Toileting (which includes using toilet bed pan or urinal) 2  -KF     Putting on and taking off regular upper body clothing 3  -KF     Taking care of personal grooming (such as brushing teeth) 3  -KF     Eating meals 3  -KF     AM-PAC 6 Clicks Score (OT) 14  -KF     Row Name 01/09/23 1502          Functional Assessment    Outcome Measure Options AM-PAC 6 Clicks Daily Activity (OT)  -KF           User Key  (r) = Recorded By,  (t) = Taken By, (c) = Cosigned By    Initials Name Provider Type    Miri Esteves, OT Occupational Therapist                Occupational Therapy Education     Title: PT OT SLP Therapies (Done)     Topic: Occupational Therapy (Done)     Point: ADL training (Done)     Description:   Instruct learner(s) on proper safety adaptation and remediation techniques during self care or transfers.   Instruct in proper use of assistive devices.              Learning Progress Summary           Patient Acceptance, E,TB,D, VU,NR by  at 1/9/2023 1503    Acceptance, E, VU by KM at 1/8/2023 2044    Acceptance, E, VU by KM at 1/8/2023 0257    Acceptance, E, VU by KM at 1/8/2023 0256    Acceptance, E, VU by KM at 1/8/2023 0255    Acceptance, E,TB,D, VU,NR by  at 1/5/2023 1339                   Point: Home exercise program (Done)     Description:   Instruct learner(s) on appropriate technique for monitoring, assisting and/or progressing therapeutic exercises/activities.              Learning Progress Summary           Patient Acceptance, E, VU by KM at 1/8/2023 2044    Acceptance, E, VU by KM at 1/8/2023 0257    Acceptance, E, VU by KM at 1/8/2023 0256    Acceptance, E, VU by KM at 1/8/2023 0255                   Point: Precautions (Done)     Description:   Instruct learner(s) on prescribed precautions during self-care and functional transfers.              Learning Progress Summary           Patient Acceptance, E,TB,D, VU,NR by  at 1/9/2023 1503    Acceptance, E, VU by KM at 1/8/2023 2044    Acceptance, E, VU by KM at 1/8/2023 0257    Acceptance, E, VU by KM at 1/8/2023 0256    Acceptance, E, VU by KM at 1/8/2023 0255    Acceptance, E,TB,D, VU,NR by  at 1/5/2023 1339                   Point: Body mechanics (Done)     Description:   Instruct learner(s) on proper positioning and spine alignment during self-care, functional mobility activities and/or exercises.              Learning Progress Summary           Patient  Acceptance, E,TB,D, VU,NR by  at 1/9/2023 1503    Acceptance, E, VU by  at 1/8/2023 2044    Acceptance, E, VU by  at 1/8/2023 0257    Acceptance, E, VU by  at 1/8/2023 0256    Acceptance, E, VU by  at 1/8/2023 0255    Acceptance, E,TB,D, VU,NR by  at 1/5/2023 1339                               User Key     Initials Effective Dates Name Provider Type Discipline     06/16/21 -  Lilli Calzada, RN Registered Nurse Nurse     10/25/22 -  Jodi Gorman, OT Occupational Therapist OT     06/16/21 -  Miri Garcia OT Occupational Therapist OT              OT Recommendation and Plan  Therapy Frequency (OT): daily  Plan of Care Review  Plan of Care Reviewed With: patient  Progress: improving  Outcome Evaluation: Pt participated in standing TA to progress weightshifting for functional transfer progression such as use of BSC, required use of bedpan at this time for toileting completion depA, Ray rolling, Ray bed mob with max cues for sequencing including tactile cues throughout for guidance, Ray x2 STS at FWW, cont IPOT per POC recom SNF at d/c     Time Calculation:    Time Calculation- OT     Row Name 01/09/23 1347             Time Calculation- OT    OT Start Time 1347  -KF      OT Received On 01/09/23  -KF         Timed Charges    12952 - OT Therapeutic Activity Minutes 11  -KF      38150 - OT Self Care/Mgmt Minutes 4  -KF         Total Minutes    Timed Charges Total Minutes 15  -KF       Total Minutes 15  -KF            User Key  (r) = Recorded By, (t) = Taken By, (c) = Cosigned By    Initials Name Provider Type    KF Miri Garcia, OT Occupational Therapist              Therapy Charges for Today     Code Description Service Date Service Provider Modifiers Qty    39771223634 HC OT THERAPEUTIC ACT EA 15 MIN 1/9/2023 Miri Garcia OT GO 1               Miri Garcia OT  1/9/2023

## 2023-01-09 NOTE — CASE MANAGEMENT/SOCIAL WORK
Discharge Planning Assessment  [facility]     Patient Name: Olu Henriquez  MRN: [id number]  Today's Date: 1/9/2023    Admit Date: 1/4/2023    Plan: SNF   Discharge Needs Assessment    No documentation.                Discharge Plan     Row Name 01/09/23 1525       Plan    Plan SNF    Patient/Family in Agreement with Plan yes    Plan Comments Spoke with Martha at [facility].  She has offered a bed at [facility] (rehab to LTC) when medically ready.  Pt has accepted pending any add'l offers that he may consider.  CM will cont to follow for medical readiness.    Final Discharge Disposition Code 03 - skilled nursing facility (SNF)              Continued Care and Services - Admitted Since 1/4/2023     Destination Coordination complete.    Service Provider Request Status Selected Services Address Phone Fax Patient Preferred    [facility] [address] [place] [address] [phone] --    [facility] Pending - Request Sent N/A [address] [id number] [phone] --    [facility] Pending - No Request Sent N/A [address] [id number] [phone] --    [facility] Pending - No Request Sent N/A [address] [place] [id number] [phone] --    [facility] Pending - No Request Sent N/A [address] [place] [id number] [phone] --    [facility] Pending - No [facility] Sent N/A [address] [facility] [id number] [phone] --    [facility] Pending - No Request Sent N/A [address] [id number] [phone] --    [facility] Pending - No Request Sent N/A [address] [id number] [phone] --              Expected Discharge Date and  Time     Expected Discharge Date Expected Discharge Time    Jan 11, 2023          Demographic Summary    No documentation.                Functional Status    No documentation.                Psychosocial    No documentation.                Abuse/Neglect    No documentation.                Legal    No documentation.                Substance Abuse    No documentation.                Patient Forms    No documentation.                   Linnea Reeder RN

## 2023-01-09 NOTE — PLAN OF CARE
Goal Outcome Evaluation:  Plan of Care Reviewed With: patient        Progress: improving  Outcome Evaluation: Pt participated in standing TA to progress weightshifting for functional transfer progression such as use of BSC, required use of bedpan at this time for toileting completion depA, Ray rolling, Ray bed mob with max cues for sequencing including tactile cues throughout for guidance, Ray x2 STS at FWW, cont IPOT per POC recom SNF at d/c

## 2023-01-09 NOTE — CONSULTS
Olu Henriquez  [id number]  1950   LOS: 5 days   Patient Care Team:  PHYSICIAN: Jesse De Anda MD     Mr. Henriquez is a 72-year-old  white male from [place], [place], former .    Chief Complaint: Atrial flutter/tachybradycardia syndrome    Problem List:  1. Paroxysmal atrial flutter/tachybradycardia syndrome  a. CHADsVasc 1, not on anticoagulation due to high fall risk  b. Echocardiogram 1/3/2023: LVEF 56-60%, LV wall thickness consistent with mild to moderate concentric hypertrophy, LA volume moderately increased, RA cavity mildly dilated, mild to moderate AR, moderate MR, mild TR, RVSP 35-45 mmHg, large anechoic structure adjacent or within the liver measuring 21 x 12.8 cm  2. BAILEE/LML community-acquired pneumonia/acute respiratory failure with hypoxia  3. Anechoic liver lesion  4. RBBB  5. Anemia  6. Glaucoma  7. Frequent falls  8. Tremor  9. Alcohol use; 2 cans of beer, 4 shots of liquor per week  10. Bladder and bowel incontinence  11. Debility  12. Former tobacco abuse; 1 pack/day x 58 years, quit 12/25/2022  13. Surgical history:  a. Chest surgery in 2009 after having pneumonia and multiple chest tubes-data deficit  b. Eye surgery      No Known Allergies  Medications Prior to Admission   Medication Sig Dispense Refill Last Dose   • aspirin 81 MG EC tablet Take 81 mg by mouth Daily.   1/4/2023   • ibuprofen (ADVIL,MOTRIN) 800 MG tablet Take 800 mg by mouth Every 6 (Six) Hours As Needed for Mild Pain .   Past Week   • propranolol (INDERAL) 40 MG tablet TAKE 1 TABLET BY MOUTH EVERY DAY 90 tablet 3 1/4/2023     Scheduled Meds:aspirin, 81 mg, Oral, Daily  enoxaparin, 40 mg, Subcutaneous, Daily  guaifenesin, 200 mg, Oral, Q6H  hydrALAZINE, 25 mg, Oral, Q8H  ipratropium-albuterol, 3 mL, Nebulization, 4x Daily - RT  lidocaine, 1 patch, Transdermal, Q24H  metoprolol tartrate, 50 mg, Oral, Q12H  miconazole, , Topical, Q12H  senna-docusate sodium, 2 tablet, Oral, BID  sodium chloride, 10  mL, Intravenous, Q12H           History of Present Illness:   This is a 72-year-old white male who presented to [facility] 1/5/2023 for inability to take care of himself.  Apparently the patient had not left his home in 6 months prior.  The patient is blind and has fallen 10-12 times over the past few weeks and has had bowel and bladder incontinence for the past month with intermittent cough.  When he presented to [facility] ED he was afebrile but tachycardic with heart rates in the 120s.  He normally takes propranolol for tremor.  COVID, flu, and RSV were negative.  He was found to have BAILEE and LML PNA.  He smoked 1 pack/day x 58 years and quit on 12/25/2022.  Patient had normally been drinking 2 beers and 4 shots of liquor per week but had not felt well over the past week and not been drinking as much.  During hospitalization the patient had pauses up to 4 seconds.  His propranolol was switched to metoprolol.  There was concerns for tachybradycardia syndrome and he was felt to not be a candidate for anticoagulation in view of frequent falls.  Echo with normal EF, moderate MR, RVSP 35-45 mmHg, and was found to have a large anechoic liver lesion.  Patient is unaware of any tachycardia.  Blood cultures have thus far have shown no growth.  Over the past 24 hours patient's heart rate has ranged between 47- 111 bpm.  The patient denies any prior cardiac history and has never had a stress test, heart catheterizations, heart monitors, CVAs, TIAs, seizures, DVTs, or PEs.  He has scarlet fever as a child but not rheumatic fever.  Over the past month his eyesight has worsened significantly.  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  He is working with case management for options after discharge.  He denies any chest pain, shortness of breath, sputum production, palpitations, presyncope, or syncope.  He says  "that he was unaware that he had pneumonia until he got here.  The patient said that he was told that he may have a pacemaker sometime between today and Friday.    Cardiac risk factors: advanced age (older than 55 for men, 65 for women), male gender and sedentary lifestyle.  Recently stopped smoking 2022    Social History     Socioeconomic History   • Marital status:    Tobacco Use   • Smoking status: Former     Packs/day: 1.00     Years: 58.00     Pack years: 58.00     Types: Cigarettes     Quit date: 2022     Years since quittin.0   • Smokeless tobacco: Never   Substance and Sexual Activity   • Alcohol use: Yes     Alcohol/week: 6.0 standard drinks     Types: 2 Cans of beer, 4 Shots of liquor per week     Comment: Very little anymore; last 1 month ago   • Drug use: Never   • Sexual activity: Not Currently     Family History   Problem Relation Age of Onset   • No Known Problems Sister    • Hyperthyroidism Brother    • No Known Problems Daughter    • Other Mother 87        unknown   • Colon cancer Father    • No Known Problems Sister    • No Known Problems Maternal Grandmother    • Heart disease Maternal Grandfather    • No Known Problems Paternal Grandmother    • Alcohol abuse Paternal Grandfather    • Lung disease Paternal Grandfather         black lung       Review of Systems  10 point review of systems was completed, positives outlined in the HPI, and otherwise all other systems are negative.      Objective:       Physical Exam  /84 (BP Location: Left arm, Patient Position: Lying)   Pulse 87   Temp 98.6 °F (37 °C) (Oral)   Resp 16   Ht 177.8 cm (70\")   Wt 77.6 kg (171 lb 1.2 oz)   SpO2 92%   BMI 24.55 kg/m²       23  1009 23  1049   Weight: 77.6 kg (171 lb) 77.6 kg (171 lb 1.2 oz)     Body mass index is 24.55 kg/m².    Intake/Output Summary (Last 24 hours) at 2023 1407  Last data filed at 2023 0800  Gross per 24 hour   Intake 775 ml   Output 1500 ml   Net " -725 ml       General Appearance:  Alert, cooperative, no distress, appears stated age, ill-appearing   Head:  Normocephalic, without obvious abnormality, atraumatic   Neck: Supple, symmetrical, trachea midline, no adenopathy, thyroid: not enlarged, symmetric, no tenderness/mass/nodules, no carotid bruit or JVD   Lungs:    Decreased breath sounds to auscultation bilaterally, respirations unlabored, 3 L O2 NC   Heart:  Irregular rate and rhythm, S1, S2 normal, grade 2/6 murmur, rub or gallop   Abdomen:   Soft, nontender, no masses, no organomegaly, bowel sounds audible x4   Extremities: No edema, normal range of motion   Pulses: 2+ and symmetric   Skin: Skin color, texture, turgor normal, no rashes or lesions   Neurologic: Normal       Cardiographics  EKG 1/8/2023:  Atrial flutter with variable AV block  Left axis deviation  Right bundle branch block  Abnormal ECG  When compared with ECG of 07-JAN-2023 08:51, (Unconfirmed)  Atrial flutter has replaced Atrial fibrillation  Vent. rate has decreased BY  51 BPM  Nonspecific T wave abnormality, worse in Anterior leads  QT has shortened  Confirmed by MD Lamas Robert ([id number]) on 1/8/2023 12:28:00 PM    Echocardiogram 1/5/2023:   Left ventricular systolic function is normal. Left ventricular ejection fraction appears to be 56 - 60%.  •  Left ventricular wall thickness is consistent with mild to moderate concentric hypertrophy.  •  Mildly reduced right ventricular systolic function noted.  •  Left atrial volume is moderately increased.  •  The right atrial cavity is mildly dilated.  •  Mild to moderate aortic valve regurgitation is present.  •  Moderate mitral valve regurgitation is present.  •  Mild tricuspid valve regurgitation is present.  •  Estimated right ventricular systolic pressure from tricuspid regurgitation is mildly elevated (35-45 mmHg).  •  Incidental finding of a large anechoic structure adjacent or within the liver, measuring 21.0 x 12.8  cm.      Imaging  Chest x-ray 1/8/2023:Increasing left-sided pleural fluid with increasing volume loss in the left lung. Persistent left-sided pulmonary infiltrate.    Liver ultrasound 1/6/2023:   1. Coarsened liver echotexture and mildly nodular liver capsule, questionable for mild changes of cirrhosis. Otherwise unremarkable appearance of liver, with no evidence of liver cyst or mass. 2. Mildly dilated common bile duct, at approximately 9 mm. 3. Markedly distended bladder to approximately 18 cm.    Lab Review   Results from last 7 days   Lab Units 01/09/23  0658 01/08/23 2046 01/08/23 0907 01/08/23 0637 01/07/23  0951   SODIUM mmol/L 134*  --   --  132* 136   POTASSIUM mmol/L 4.3 4.1 3.6 4.9 3.4*   CHLORIDE mmol/L 100  --   --  101 98   CO2 mmol/L 25.0  --   --  23.0 27.0   BUN mg/dL 16  --   --  5* 13   CREATININE mg/dL 0.72*  --   --  0.76 0.79   GLUCOSE mg/dL 88  --   --  85 147*   CALCIUM mg/dL 8.3*  --   --  7.8* 8.2*     Results from last 7 days   Lab Units 01/08/23  0637 01/06/23  0615 01/05/23  1011   WBC 10*3/mm3 7.16 10.04 12.83*   HEMOGLOBIN g/dL 9.6* 12.6* 13.5   HEMATOCRIT % 29.4* 38.0 39.8   PLATELETS 10*3/mm3 385 168 178             Results from last 7 days   Lab Units 01/04/23 2054 01/04/23  1113   TROPONIN T ng/mL 0.010 <0.010         Assessment:   Patient with atrial flutter in the setting of moderate MR and LML/BAILEE PNA.  Patient with frequent falls due to blindness/glaucoma.  Concern for tachybradycardia syndrome.  Not a  candidate for anticoagulation due to frequent falls.  Prior to change from propranolol to metoprolol the patient was having pauses up to 4 seconds.          Plan:   1.  Anemia needs work-up and treatment.  2. Continued treatment for PNA  3. MCOT at discharge  4. Defer PPM at this time    Scribed for Timo Cordova MD by DEMETRIO Chavez. 1/9/2023  15:19 EST     History and physical examination verified.  Agree with PAs note.  72-year-old white male with a history of  blindness/glaucoma, chronic atrial arrhythmias, right bundle branch block, valvular heart disease, chronic anemia, risk for recurrent falls, severe left atrial enlargement, significant history of tobacco and alcohol use, and longstanding tremors receptacles admitted secondary to left-sided pneumonia.  During admission, he was found to be in atrial flutter with both slow and rapid regular rates.  His propranolol was subsequently changed to metoprolol.  We are asked to see him regarding his atrial flutter and both slow and fast rates.  Patient himself is asymptomatic from an arrhythmia standpoint.  He does not know that he is in atrial flutter.  He denies any history of near syncope or syncope.  He denies any ongoing chest pain.  He does note shortness of breath.  Patient states that overall resting tremor is about the same after switching from propranolol to metoprolol.    Review of systems/social history/family history as per nurse practitioner's note.    Physical Exam Temperature 97.8 pulse is 60 respiratory rate is 18 blood pressure is 148/88 O2 saturation on 3 L of nasal cannula 96%  Constitutional: oriented to person, place, and time.  well-developed and well-nourished. No distress.   HENT: Normocephalic.   Eyes: Conjunctivae are normal. No scleral icterus.   Neck: Normal carotid pulses, no hepatojugular reflux and no JVD present. Carotid bruit is not present. No tracheal deviation, no edema and no erythema present. No thyromegaly present.   Cardiovascular: Irregular regular rate rhythm normal S1 and S2.  There is a MR murmur appreciated.  PMI not appreciated  Pulses: equal and symmetrical.   Pulmonary/Chest: Decreased breath sounds left greater than right with wheezes noted.  Respiratory effort fair abdominal: Soft. Bowel sounds are normal. no distension and no mass. There is no hepatosplenomegaly. There is no tenderness. There is no rebound and no guarding. No aortic pulsations.  Musculoskeletal:  exhibits no  edema, tenderness or deformity.   Neurological: is alert and oriented to person, place, and time.  Rest is nonfocal.    Skin: Skin is warm and dry. No rash noted. No diaphoretic. No cyanosis or erythema. No pallor. Nails show no clubbing.   Psychiatric: Normal mood and affect.Speech is normal and behavior is normal.    Laboratory data as per nurse practitioner's note.  Hematocrit noted at 29.  Troponin within normal limits.  Creatinine 0.72.    Chest x-ray: Increasing left-sided pleural fluid with increasing volume loss in the left lung persistent left-sided pulmonary infiltrates.    CT scan of the chest pending.    Twelve-lead EKG: Atrial flutter with right bundle branch block and left anterior fascicular block with heart rate 52 bpm.    Impression/plan:    1.  Atrial flutter of unknown duration in a patient with right bundle branch block left anterior fascicular block with both slow and fast ventricular rates in the setting of pneumonia and anemia.  Presently with adequate ventricular rates on metoprolol medication.  The patient denies any worsening of his right resting tremor at the present time.  He remains asymptomatic from his arrhythmia as well as from his heart rate.  Echocardiogram does demonstrate significant left atrial enlargement and for that reason ablation of his atrial flutter would probably have a low yield due to his increased risk for developing other atrial arrhythmias.  He has a Dangelo Vasc Score of 3 and presently on aspirin.  I had a long discussion with the patient today.  Since he is asymptomatic and his heart rates are better controlled on metoprolol, we will not pursue pacemaker implantation at this time.  In regards to his oral anticoagulation, we had a long discussion regarding Eliquis versus aspirin.  The patient is not interested in pursuing oral anticoagulation due to his multiple falls, history of anemia and being blind.  Therefore we will stay on the aspirin for now.  Agree with MARIO  at discharge to monitor patient's heart rates once his pneumonia is treated      ITimo MD, personally performed the services face to face as described and documented by the above named individual. I have made any necessary edits and it is both accurate and complete 1/9/2023  17:08 EST

## 2023-01-09 NOTE — PROGRESS NOTES
[facility]  PROGRESS NOTE    Patient Name: Olu Henriquez  : 1950  MRN: [id number]    Date of Admission: 2023  Primary Care Physician: Jesse De Anda MD    Subjective   Subjective     CC:  F/u PNA    HPI:   Patient resting in bed, sleeping, awakes to voice.  Discussed getting another CT as patient as left lung is quiet to auscultation. Still on 2L O2, no home O2. Patient says he had to have a lung decortication many years ago.    ROS:  Gen- No fevers, chills  CV- No chest pain, palpitations  Resp- No dyspnea  GI- No N/V/D, abd pain    Objective   Objective     Vital Signs:   Temp:  [98.3 °F (36.8 °C)-98.6 °F (37 °C)] 98.6 °F (37 °C)  Heart Rate:  [] 61  Resp:  [17-18] 18  BP: (146-172)/(82-95) 167/84  Flow (L/min):  [2-3] 3     Physical Exam:   Constitutional: No acute distress, awake, alert  HENT: NCAT, mucous membranes moist  Respiratory: Diminished to auscultation LLL, respiratory effort normal, 2L NC  Cardiovascular: RRR, no murmurs, rubs, or gallops  Gastrointestinal: Positive bowel sounds, soft, nontender, nondistended  Musculoskeletal: No bilateral ankle edema  Psychiatric: Appropriate affect, cooperative  Neurologic: Oriented x 3, moves all extremities, blind, speech clear  Skin: No rashes      Results Reviewed:  LAB RESULTS:      Lab 23  0637 23  0615 23  1011 23  2054 23  1435 23  1113   WBC 7.16 10.04 12.83*  --   --  17.97*   HEMOGLOBIN 9.6* 12.6* 13.5  --   --  16.1   HEMATOCRIT 29.4* 38.0 39.8  --   --  46.8   PLATELETS 385 168 178  --   --  224   NEUTROS ABS  --   --  9.82*  --   --  15.56*   IMMATURE GRANS (ABS)  --   --  0.13*  --   --  0.26*   LYMPHS ABS  --   --  1.64  --   --  0.81   MONOS ABS  --   --  1.18*  --   --  1.19*   EOS ABS  --   --  0.01  --   --  0.12   .0* 92.9 91.9  --   --  90.3   PROCALCITONIN  --   --   --   --   --  0.45*   LACTATE  --   --   --  1.3 2.2* 2.3*          Lab 01/08/23 2046 01/08/23 0907 01/08/23 0637 01/07/23  0951 01/06/23 0615 01/05/23 1011 01/04/23 2054 01/04/23  1113   SODIUM  --   --  132* 136 137 134*  --  136   POTASSIUM 4.1 3.6 4.9 3.4* 3.5 3.6 2.8* 2.8*   CHLORIDE  --   --  101 98 101 98  --  97*   CO2  --   --  23.0 27.0 26.0 27.0  --  22.0   ANION GAP  --   --  8.0 11.0 10.0 9.0  --  17.0*   BUN  --   --  5* 13 19 20  --  35*   CREATININE  --   --  0.76 0.79 0.76 0.82  --  1.18   EGFR  --   --  94.9 93.8 94.9 92.8  --  65.2   GLUCOSE  --   --  85 147* 90 115*  --  138*   CALCIUM  --   --  7.8* 8.2* 8.5* 8.2*  --  8.8   MAGNESIUM  --  1.5*  --  1.5*  --  2.0 2.4 1.4*   PHOSPHORUS  --   --   --   --  3.4 2.2* 2.6  --    TSH  --   --   --   --   --   --   --  3.730         Lab 01/08/23 0637 01/06/23 0615 01/05/23 1011 01/04/23  1113   TOTAL PROTEIN 5.9* 5.5* 5.7* 7.2   ALBUMIN 2.2* 3.0* 3.1* 3.4*   GLOBULIN 3.7 2.5 2.6 3.8   ALT (SGPT) 34 28 27 17   AST (SGOT) 74* 52* 62* 40   BILIRUBIN 0.9 0.5 0.7 1.3*   ALK PHOS 199* 66 73 73         Lab 01/05/23 1011 01/04/23 2054 01/04/23  1113   PROBNP 1,435.0*  --   --    TROPONIN T  --  0.010 <0.010             Lab 01/08/23 0637   FOLATE >20.00   VITAMIN B 12 1,624*         Brief Urine Lab Results  (Last result in the past 365 days)      Color   Clarity   Blood   Leuk Est   Nitrite   Protein   CREAT   Urine HCG        01/04/23 1200 Dark Yellow   Clear   Negative   Negative   Negative   30 mg/dL (1+)                 Microbiology Results Abnormal     Procedure Component Value - Date/Time    Blood Culture - Blood, Hand, Left [[id number]]  (Normal) Collected: 01/04/23 1120    Lab Status: Preliminary result Specimen: Blood from Hand, Left Updated: 01/08/23 1145     Blood Culture No growth at 4 days    Blood Culture - Blood, Arm, Right [[id number]]  (Normal) Collected: 01/04/23 1115    Lab Status: Preliminary result Specimen: Blood from Arm, Right Updated: 01/08/23 1145     Blood Culture No growth at 4 days     Legionella Antigen, Urine - Urine, Urine, Clean Catch [[id number]]  (Normal) Collected: 01/04/23 2333    Lab Status: Final result Specimen: Urine, Clean Catch Updated: 01/05/23 1048     LEGIONELLA ANTIGEN, URINE Negative    S. Pneumo Ag Urine or CSF - Urine, Urine, Clean Catch [[id number]]  (Normal) Collected: 01/04/23 2333    Lab Status: Final result Specimen: Urine, Clean Catch Updated: 01/05/23 1048     Strep Pneumo Ag Negative    MRSA Screen, PCR (Inpatient) - Swab, Nares [[id number]]  (Normal) Collected: 01/04/23 2125    Lab Status: Final result Specimen: Swab from Nares Updated: 01/05/23 0955     MRSA PCR Negative    Narrative:      The negative predictive value of this diagnostic test is high and should only be used to consider de-escalating anti-MRSA therapy. A positive result may indicate colonization with MRSA and must be correlated clinically.  MRSA Negative    COVID PRE-OP / PRE-PROCEDURE SCREENING ORDER (NO ISOLATION) - Swab, Nasopharynx [[id number]]  (Normal) Collected: 01/04/23 1118    Lab Status: Final result Specimen: Swab from Nasopharynx Updated: 01/04/23 1212    Narrative:      The following orders were created for panel order COVID PRE-OP / PRE-PROCEDURE SCREENING ORDER (NO ISOLATION) - Swab, Nasopharynx.  Procedure                               Abnormality         Status                     ---------                               -----------         ------                     COVID-19, FLU A/B, RSV P...[[id number]]  Normal              Final result                 Please view results for these tests on the individual orders.    COVID-19, FLU A/B, RSV PCR - Swab, Nasopharynx [[id number]]  (Normal) Collected: 01/04/23 1118    Lab Status: Final result Specimen: Swab from Nasopharynx Updated: 01/04/23 1212     COVID19 Not Detected     Influenza A PCR Not Detected     Influenza B PCR Not Detected     RSV, PCR Not Detected    Narrative:      Fact sheet for providers:  https://www.fda.gov/media/885426/download    Fact sheet for patients: https://www.fda.gov/media/029294/download    Test performed by PCR.          XR Chest 1 View    Result Date: 1/8/2023  XR CHEST 1 VW Date of Exam: 1/8/2023 5:00 AM EST Indication: DYSPNEA, ON EXERTION dyspnea. Comparison: 1/4/2023 Findings: Cardiac size is unchanged. There is increasing left-sided pleural fluid. There is increasing volume loss in the left lung. There continues to be infiltrate in the left base. The right lung is clear. There are multiple old healed right-sided rib fractures.     Impression: Impression: 1. Increasing left-sided pleural fluid with increasing volume loss in the left lung. Persistent left-sided pulmonary infiltrate. Electronically Signed: Jamison Ford  1/8/2023 9:55 AM EST  Workstation ID: [id number]      Results for orders placed during the hospital encounter of 01/04/23    Adult Transthoracic Echo Complete W/ Cont if Necessary Per Protocol    Interpretation Summary  •  Left ventricular systolic function is normal. Left ventricular ejection fraction appears to be 56 - 60%.  •  Left ventricular wall thickness is consistent with mild to moderate concentric hypertrophy.  •  Mildly reduced right ventricular systolic function noted.  •  Left atrial volume is moderately increased.  •  The right atrial cavity is mildly dilated.  •  Mild to moderate aortic valve regurgitation is present.  •  Moderate mitral valve regurgitation is present.  •  Mild tricuspid valve regurgitation is present.  •  Estimated right ventricular systolic pressure from tricuspid regurgitation is mildly elevated (35-45 mmHg).  •  Incidental finding of a large anechoic structure adjacent or within the liver, measuring 21.0 x 12.8 cm.      I have reviewed the medications:  Scheduled Meds:aspirin, 81 mg, Oral, Daily  enoxaparin, 40 mg, Subcutaneous, Daily  guaifenesin, 200 mg, Oral, Q6H  hydrALAZINE, 25 mg, Oral, Q8H  ipratropium-albuterol, 3 mL,  Nebulization, 4x Daily - RT  lidocaine, 1 patch, Transdermal, Q24H  metoprolol tartrate, 50 mg, Oral, Q12H  miconazole, , Topical, Q12H  senna-docusate sodium, 2 tablet, Oral, BID  sodium chloride, 10 mL, Intravenous, Q12H      Continuous Infusions:   PRN Meds:.•  acetaminophen  •  senna-docusate sodium **AND** polyethylene glycol **AND** bisacodyl **AND** bisacodyl  •  doxylamine  •  magnesium sulfate **OR** magnesium sulfate **OR** magnesium sulfate  •  melatonin  •  potassium & sodium phosphates **OR** potassium & sodium phosphates  •  potassium chloride **OR** potassium chloride **OR** potassium chloride  •  sodium chloride  •  sodium chloride  •  sodium chloride    Assessment & Plan   Assessment & Plan     Active Hospital Problems    Diagnosis  POA   • **Acute respiratory failure with hypoxia (HCC) [J96.01]  Yes   • CAP (community acquired pneumonia) [J18.9]  Yes   • Sepsis, unspecified organism (HCC) [A41.9]  Yes   • Moderate dehydration [E86.0]  Yes   • Hypokalemia [E87.6]  Yes   • Hypomagnesemia [E83.42]  Yes   • Physical deconditioning [R53.81]  Unknown      Resolved Hospital Problems   No resolved problems to display.        Brief Hospital Course to date:  Olu Henriquez is a 72 y.o. male with PMH significant for HTN, essential tremor and glaucoma. He has slowly lost his sight over the past few months and is now legally blind. He has fallen multiple times at home due to the blindness. Admitted for sepsis / acute hypoxic respiratory failure secondary to LML / BAILEE community acquired pneumonia. Cardiology consulted for atrial flutter and concerns for tachy-gale syndrome. ECHO incidentally revealed a large, anechoic structure adjacent or within the liver measuring 21 x 12.8cm.      This patient's problems and plans were partially entered by my partner and updated as appropriate by me 01/09/23.    Sepsis secondary to BAILEE / LML pneumonia  Acute respiratory failure with hypoxia  -sepsis criteria on arrival HR  132, RR 20, WBC 17.9, lactic 2.3  -CTA reviewed, BAILEE and LML pna with mediastinal lymphadenopathy  -wean oxygen as tolerated  -OPEP/pulmonary toilet, saline neb  -CXR 1/8 reviewed, increasing left pleural fluid, ?exudative given liver findings  -continue Rocephin, doxy to complete 5 days  -add scheduled Robitussin DM, duo-nebs  -repeat CT chest, LLL quiet on auscultation, h/o lung decortication 13 years ago per patient     Atrial flutter with RVR  Concern for tachy-gale syndrome   -cardiology following  -s/p digoxin x 1, resumed BB for rate control  -possible PPM  -not on AC due to fall risk reportedly  -ECHO reviewed, EF 56-60%, moderate MVR     Anemia  -drop in H&H to 9.6/29.4 today  -B12, folate okay    Anechoic liver lesion  Elevated alk phos, AST  -US reviewed, coarsened, nodular echotexture of liver concerning for cirrhosis, dilation of CBD   -needs hepatology follow up vs GI consult  -Denies abdominal pain     Hypomagnesemia  Hypokalemia  -Replace per protocol      Debility / frequent falls   -PT/OT following. He is requiring assist x 2 and recommend SNF rehab  -Case management following        Expected Discharge Location and Transportation: Home, car  Expected Discharge   Expected Discharge Date and Time     Expected Discharge Date Expected Discharge Time    Jose Juan 10, 2023            DVT prophylaxis:  Medical DVT prophylaxis orders are present.     AM-PAC 6 Clicks Score (PT): 11 (01/07/23 0800)    CODE STATUS:   Code Status and Medical Interventions:   Ordered at: 01/04/23 1452     Level Of Support Discussed With:    Patient     Code Status (Patient has no pulse and is not breathing):    CPR (Attempt to Resuscitate)     Medical Interventions (Patient has pulse or is breathing):    Full Support       Marily Centeno, APRN  01/09/23

## 2023-01-09 NOTE — PROGRESS NOTES
[facility]  Inpatient Progress Note      Chief Complaint/Reason for consult:    Chief Complaint   Patient presents with   • Weakness - Generalized   • Fall            Subjective  No acute events overnight, tremor feels a little worse than baseline but thinks this may be due to stress of being in the hospital    Review of Systems:   Negative for chest pain, shortness of breath, palpitations, fevers, chills, abdominal pain, nausea, vomiting       Vital Sign Min/Max for last 24 hours  Temp  Min: 98.3 °F (36.8 °C)  Max: 98.6 °F (37 °C)   BP  Min: 146/95  Max: 172/85   Pulse  Min: 47  Max: 111   Resp  Min: 17  Max: 18   SpO2  Min: 91 %  Max: 100 %   Flow (L/min)  Min: 2  Max: 3      Intake/Output Summary (Last 24 hours) at 1/9/2023 0843  Last data filed at 1/9/2023 0500  Gross per 24 hour   Intake 1020 ml   Output 1500 ml   Net -480 ml           Gen: well developed, older WM, lying in bed  HEENT: MMM, sclerae anicteric, conjunctivae normal  CV: regular rate, regular rhythm, II/VI DEVIN at RUSB, II/VI mid diastolic murmur at base, 2+ radial and EP pulses  Pulm: RA, normal work of breathing, no wheezes, rales, rhonchi  Ext: normal bulk for age, normal tone, no dependent edema  Neuro: alert, oriented, face symmetrical, moving all extremities well, bilateral hand resting tremor  Psych: normal mood, appropriate affect    Tele:  Atrial flutter, variable rates but largely 60bpm, some tachy episodes, gale episodes are self limited but numerous 2 sec R-R, none as long as previously seen    Results Review (reviewed the patient's recent labs in the electronic medical record):     EKG:  Atrial flutter with variable conduction     CXR:    XR Chest 1 View    Result Date: 1/8/2023  Impression: 1. Increasing left-sided pleural fluid with increasing volume loss in the left lung. Persistent left-sided pulmonary infiltrate. Electronically Signed: Jamison Ford  1/8/2023 9:55 AM EST  Workstation ID:  [id number]      Lab Results   Component Value Date    WBC 7.16 01/08/2023    HGB 9.6 (L) 01/08/2023    HCT 29.4 (L) 01/08/2023    .0 (H) 01/08/2023     01/08/2023     Lab Results   Component Value Date    GLUCOSE 88 01/09/2023    CALCIUM 8.3 (L) 01/09/2023     (L) 01/09/2023    K 4.3 01/09/2023    CO2 25.0 01/09/2023     01/09/2023    BUN 16 01/09/2023    CREATININE 0.72 (L) 01/09/2023    EGFRIFNONA 75 12/17/2020    BCR 22.2 01/09/2023    ANIONGAP 9.0 01/09/2023     Lab Results   Component Value Date    PROBNP 1,435.0 (H) 01/05/2023     Results for orders placed during the hospital encounter of 01/04/23    Adult Transthoracic Echo Complete W/ Cont if Necessary Per Protocol    Interpretation Summary  •  Left ventricular systolic function is normal. Left ventricular ejection fraction appears to be 56 - 60%.  •  Left ventricular wall thickness is consistent with mild to moderate concentric hypertrophy.  •  Mildly reduced right ventricular systolic function noted.  •  Left atrial volume is moderately increased.  •  The right atrial cavity is mildly dilated.  •  Mild to moderate aortic valve regurgitation is present.  •  Moderate mitral valve regurgitation is present.  •  Mild tricuspid valve regurgitation is present.  •  Estimated right ventricular systolic pressure from tricuspid regurgitation is mildly elevated (35-45 mmHg).  •  Incidental finding of a large anechoic structure adjacent or within the liver, measuring 21.0 x 12.8 cm.     [id number] Score: 2 (1/9/2023 11:47 AM)    Assessment     Atrial flutter with variable response  Tachy-gale syndrome    - held off on AC due to history of falls, only on aspirin and DVT ppx AC currently   - prior to change from propranolol pauses of up to 4 sec were seen   - rates are tolerating current dose of metoprolol currently but may be worsening the patient's ET   - will have EP see today      NAMAN Sun MD, MS  1/9/2023  08:43 EST

## 2023-01-09 NOTE — PLAN OF CARE
Goal Outcome Evaluation:  Plan of Care Reviewed With: patient        Progress: improving  Outcome Evaluation: Patient rolled L and R for use of bedpan and clean up. Patient agreeable to sit to stands however demonstrates very high anxiety requiring extended time for detailed explaination prior to movement/ activity. Patient able to demonstrate improved standing tolerance today. He declined chair. PT reccomends SNF at d/c

## 2023-01-10 LAB
MRSA DNA SPEC QL NAA+PROBE: NEGATIVE
QT INTERVAL: 506 MS
QTC INTERVAL: 506 MS

## 2023-01-10 PROCEDURE — 25010000002 PIPERACILLIN SOD-TAZOBACTAM PER 1 G: Performed by: HOSPITALIST

## 2023-01-10 PROCEDURE — 25010000002 ENOXAPARIN PER 10 MG: Performed by: STUDENT IN AN ORGANIZED HEALTH CARE EDUCATION/TRAINING PROGRAM

## 2023-01-10 PROCEDURE — 99232 SBSQ HOSP IP/OBS MODERATE 35: CPT | Performed by: INTERNAL MEDICINE

## 2023-01-10 PROCEDURE — 94664 DEMO&/EVAL PT USE INHALER: CPT

## 2023-01-10 PROCEDURE — 99232 SBSQ HOSP IP/OBS MODERATE 35: CPT | Performed by: HOSPITALIST

## 2023-01-10 PROCEDURE — 94799 UNLISTED PULMONARY SVC/PX: CPT

## 2023-01-10 PROCEDURE — 87040 BLOOD CULTURE FOR BACTERIA: CPT | Performed by: HOSPITALIST

## 2023-01-10 PROCEDURE — 87641 MR-STAPH DNA AMP PROBE: CPT | Performed by: HOSPITALIST

## 2023-01-10 RX ORDER — SODIUM CHLORIDE FOR INHALATION 7 %
4 VIAL, NEBULIZER (ML) INHALATION ONCE
Status: COMPLETED | OUTPATIENT
Start: 2023-01-10 | End: 2023-01-10

## 2023-01-10 RX ADMIN — IPRATROPIUM BROMIDE AND ALBUTEROL SULFATE 3 ML: 2.5; .5 SOLUTION RESPIRATORY (INHALATION) at 16:54

## 2023-01-10 RX ADMIN — TAZOBACTAM SODIUM AND PIPERACILLIN SODIUM 3.38 G: 375; 3 INJECTION, SOLUTION INTRAVENOUS at 10:23

## 2023-01-10 RX ADMIN — LIDOCAINE 1 PATCH: 50 PATCH CUTANEOUS at 08:13

## 2023-01-10 RX ADMIN — TAZOBACTAM SODIUM AND PIPERACILLIN SODIUM 3.38 G: 375; 3 INJECTION, SOLUTION INTRAVENOUS at 15:30

## 2023-01-10 RX ADMIN — GUAIFENESIN 200 MG: 200 SOLUTION ORAL at 10:24

## 2023-01-10 RX ADMIN — HYDRALAZINE HYDROCHLORIDE 25 MG: 25 TABLET, FILM COATED ORAL at 13:56

## 2023-01-10 RX ADMIN — HYDRALAZINE HYDROCHLORIDE 25 MG: 25 TABLET, FILM COATED ORAL at 05:00

## 2023-01-10 RX ADMIN — SODIUM CHLORIDE 4 ML: 7 NEBU SOLN,3 % NEBU at 13:45

## 2023-01-10 RX ADMIN — MICONAZOLE NITRATE: 20 POWDER TOPICAL at 08:14

## 2023-01-10 RX ADMIN — HYDRALAZINE HYDROCHLORIDE 25 MG: 25 TABLET, FILM COATED ORAL at 20:25

## 2023-01-10 RX ADMIN — GUAIFENESIN 200 MG: 200 SOLUTION ORAL at 20:28

## 2023-01-10 RX ADMIN — GUAIFENESIN 200 MG: 200 SOLUTION ORAL at 15:30

## 2023-01-10 RX ADMIN — ASPIRIN 81 MG: 81 TABLET, COATED ORAL at 08:10

## 2023-01-10 RX ADMIN — Medication 5 MG: at 01:14

## 2023-01-10 RX ADMIN — IPRATROPIUM BROMIDE AND ALBUTEROL SULFATE 3 ML: 2.5; .5 SOLUTION RESPIRATORY (INHALATION) at 09:00

## 2023-01-10 RX ADMIN — Medication 10 ML: at 20:27

## 2023-01-10 RX ADMIN — GUAIFENESIN 200 MG: 200 SOLUTION ORAL at 04:59

## 2023-01-10 RX ADMIN — DOXYLAMINE SUCCINATE 25 MG: 25 TABLET ORAL at 01:14

## 2023-01-10 RX ADMIN — ENOXAPARIN SODIUM 40 MG: 40 INJECTION SUBCUTANEOUS at 08:09

## 2023-01-10 RX ADMIN — ACETAMINOPHEN 650 MG: 325 TABLET ORAL at 16:32

## 2023-01-10 RX ADMIN — IPRATROPIUM BROMIDE AND ALBUTEROL SULFATE 3 ML: 2.5; .5 SOLUTION RESPIRATORY (INHALATION) at 13:44

## 2023-01-10 RX ADMIN — Medication 10 ML: at 08:10

## 2023-01-10 RX ADMIN — MICONAZOLE NITRATE: 20 POWDER TOPICAL at 20:27

## 2023-01-10 RX ADMIN — METOPROLOL TARTRATE 50 MG: 50 TABLET, FILM COATED ORAL at 08:10

## 2023-01-10 NOTE — PLAN OF CARE
Problem: Fall Injury Risk  Goal: Absence of Fall and Fall-Related Injury  Intervention: Promote Injury-Free Environment  Recent Flowsheet Documentation  Taken 1/10/2023 0215 by Sindy Cruz RN  Safety Promotion/Fall Prevention:   assistive device/personal items within reach   activity supervised   safety round/check completed  Taken 1/10/2023 0015 by Sindy Cruz RN  Safety Promotion/Fall Prevention:   activity supervised   assistive device/personal items within reach   safety round/check completed  Taken 1/9/2023 2200 by Sindy Cruz RN  Safety Promotion/Fall Prevention:   activity supervised   assistive device/personal items within reach   safety round/check completed  Taken 1/9/2023 2015 by Sindy Cruz RN  Safety Promotion/Fall Prevention:   activity supervised   assistive device/personal items within reach   safety round/check completed     Problem: Skin Injury Risk Increased  Goal: Skin Health and Integrity  Intervention: Optimize Skin Protection  Recent Flowsheet Documentation  Taken 1/10/2023 0215 by Sindy Cruz RN  Pressure Reduction Techniques: frequent weight shift encouraged  Head of Bed (HOB) Positioning: [facility] elevated  Pressure Reduction Devices: pressure-redistributing mattress utilized  Skin Protection:   adhesive use limited   incontinence pads utilized  Taken 1/10/2023 0015 by Sindy Cruz RN  Pressure Reduction Techniques: frequent weight shift encouraged  Head of Bed (HOB) Positioning: [facility] elevated  Pressure Reduction Devices: pressure-redistributing mattress utilized  Skin Protection:   adhesive use limited   incontinence pads utilized  Taken 1/9/2023 2200 by Sindy Cruz RN  Pressure Reduction Techniques: frequent weight shift encouraged  Head of Bed (HOB) Positioning: [facility] elevated  Pressure Reduction Devices: pressure-redistributing mattress utilized  Skin Protection:   adhesive use limited   incontinence pads utilized  Taken 1/9/2023 2015 by Sindy Cruz RN  Pressure Reduction  Techniques: frequent weight shift encouraged  Head of Bed (HOB) Positioning: HOB elevated  Pressure Reduction Devices: pressure-redistributing mattress utilized  Skin Protection:   adhesive use limited   incontinence pads utilized     Problem: Adult Inpatient Plan of Care  Goal: Absence of Hospital-Acquired Illness or Injury  Intervention: Identify and Manage Fall Risk  Recent Flowsheet Documentation  Taken 1/10/2023 0215 by Sindy Cruz RN  Safety Promotion/Fall Prevention:   assistive device/personal items within reach   activity supervised   safety round/check completed  Taken 1/10/2023 0015 by Sindy Cruz RN  Safety Promotion/Fall Prevention:   activity supervised   assistive device/personal items within reach   safety round/check completed  Taken 1/9/2023 2200 by Sindy Cruz RN  Safety Promotion/Fall Prevention:   activity supervised   assistive device/personal items within reach   safety round/check completed  Taken 1/9/2023 2015 by Sindy Cruz RN  Safety Promotion/Fall Prevention:   activity supervised   assistive device/personal items within reach   safety round/check completed     Problem: Adult Inpatient Plan of Care  Goal: Absence of Hospital-Acquired Illness or Injury  Intervention: Prevent Skin Injury  Recent Flowsheet Documentation  Taken 1/10/2023 0215 by Sindy Cruz RN  Skin Protection:   adhesive use limited   incontinence pads utilized  Taken 1/10/2023 0015 by Sindy Cruz RN  Skin Protection:   adhesive use limited   incontinence pads utilized  Taken 1/9/2023 2200 by Sindy Cruz RN  Skin Protection:   adhesive use limited   incontinence pads utilized  Taken 1/9/2023 2015 by Sindy Cruz RN  Skin Protection:   adhesive use limited   incontinence pads utilized     Problem: Adult Inpatient Plan of Care  Goal: Absence of Hospital-Acquired Illness or Injury  Intervention: Prevent and Manage VTE (Venous Thromboembolism) Risk  Recent Flowsheet Documentation  Taken 1/10/2023 0215 by Anthony  Sindy, RN  Activity Management: activity adjusted per tolerance  Taken 1/10/2023 0015 by Sindy Cruz, EDGAR  Activity Management: activity adjusted per tolerance  Taken 1/9/2023 2200 by Sindy Cruz, EDGAR  Activity Management: activity adjusted per tolerance  Taken 1/9/2023 2015 by Sindy Cruz, RN  Activity Management: activity adjusted per tolerance   Goal Outcome Evaluation:           Progress: improving

## 2023-01-10 NOTE — PROGRESS NOTES
[facility] Cardiology  Inpatient Progress Note      Chief Complaint/Reason for consult:    Chief Complaint   Patient presents with   • Weakness - Generalized   • Fall            Subjective  Feeling well today, no acute events overnight       Vital Sign Min/Max for last 24 hours  Temp  Min: 97 °F (36.1 °C)  Max: 99.1 °F (37.3 °C)   BP  Min: 139/79  Max: 171/84   Pulse  Min: 48  Max: 70   Resp  Min: 16  Max: 19   SpO2  Min: 93 %  Max: 96 %   Flow (L/min)  Min: 3  Max: 3      Intake/Output Summary (Last 24 hours) at 1/10/2023 1046  Last data filed at 1/10/2023 1023  Gross per 24 hour   Intake 710 ml   Output 1700 ml   Net -990 ml           Gen: well developed, older WM, lying in bed  HEENT: MMM, sclerae anicteric, conjunctivae normal  CV: regular rate, regular rhythm, II/VI DEVIN at RUSB, II/VI mid diastolic murmur at base, 2+ radial and EP pulses  Pulm: RA, normal work of breathing, no wheezes, rales, rhonchi  Ext: normal bulk for age, normal tone, no dependent edema  Neuro: alert, oriented, face symmetrical, moving all extremities well, bilateral hand resting tremor  Psych: normal mood, appropriate affect    Tele:  Atrial flutter, variable rates but largely 60bpm, some tachy episodes, gale episodes are self limited but numerous 2 sec R-R, none as long as previously seen    Results Review (reviewed the patient's recent labs in the electronic medical record):     EKG:  Atrial flutter with variable conduction     CXR:    CT Chest Without Contrast Diagnostic    Result Date: 1/9/2023  Impression: 1. Worsening left-sided airspace disease compatible with worsening pneumonia and atelectasis. 2. New small left pleural effusion. Electronically Signed: Ulysses Kim  1/9/2023 8:44 PM EST  Workstation ID: [id number]      Lab Results   Component Value Date    WBC 9.06 01/09/2023    HGB 13.6 01/09/2023    HCT 41.6 01/09/2023    MCV 93.1 01/09/2023     01/09/2023     Lab Results   Component Value Date     GLUCOSE 88 01/09/2023    CALCIUM 8.3 (L) 01/09/2023     (L) 01/09/2023    K 4.3 01/09/2023    CO2 25.0 01/09/2023     01/09/2023    BUN 16 01/09/2023    CREATININE 0.72 (L) 01/09/2023    EGFRIFNONA 75 12/17/2020    BCR 22.2 01/09/2023    ANIONGAP 9.0 01/09/2023     Lab Results   Component Value Date    PROBNP 1,435.0 (H) 01/05/2023     Results for orders placed during the hospital encounter of 01/04/23    Adult Transthoracic Echo Complete W/ Cont if Necessary Per Protocol    Interpretation Summary  •  Left ventricular systolic function is normal. Left ventricular ejection fraction appears to be 56 - 60%.  •  Left ventricular wall thickness is consistent with mild to moderate concentric hypertrophy.  •  Mildly reduced right ventricular systolic function noted.  •  Left atrial volume is moderately increased.  •  The right atrial cavity is mildly dilated.  •  Mild to moderate aortic valve regurgitation is present.  •  Moderate mitral valve regurgitation is present.  •  Mild tricuspid valve regurgitation is present.  •  Estimated right ventricular systolic pressure from tricuspid regurgitation is mildly elevated (35-45 mmHg).  •  Incidental finding of a large anechoic structure adjacent or within the liver, measuring 21.0 x 12.8 cm.     Assessment     Atrial flutter with variable response  Tachy-gale syndrome    - EP has seen, no plans for ablation or PM at this time, will plan for 30 day MCOT on discharge   - No AC due to history of falls, only on aspirin and DVT ppx AC currently   - prior to change from propranolol pauses of up to 4 sec were seen   - rates are tolerating current dose of metoprolol currently and asymptomatic      NAMAN Sun MD, MS  1/10/2023  10:46 EST

## 2023-01-10 NOTE — PROGRESS NOTES
[facility] Medicine Services  PROGRESS NOTE    Patient Name: Olu Henriquez  : 1950  MRN: [id number]    Date of Admission: 2023  Primary Care Physician: Jesse De Anda MD    Subjective   Subjective     CC:  F/u PNA    HPI: In bed. Cough noted. Intermittent pleurisy. No f/c. No n/v. Denies flutters/palpitations.     Review of Systems   Constitutional: Positive for activity change and fatigue.   HENT: Positive for congestion.    Respiratory: Negative for chest tightness and shortness of breath.         Pleurisy   Gastrointestinal: Negative.    Genitourinary: Negative.    Neurological: Negative.        Objective   Objective     Vital Signs:   Temp:  [97 °F (36.1 °C)-99.1 °F (37.3 °C)] 99 °F (37.2 °C)  Heart Rate:  [48-70] 69  Resp:  [16-19] 18  BP: (139-171)/(72-89) 171/84  Flow (L/min):  [3] 3     Physical Exam:   NAD, alert and oriented  OP clear, MMM  Neck supple  No LAD  RRR  Decreased L base, rales on L  +BS, ND, NT, soft  SOTELO  Normal affect  No rashes    Results Reviewed:  LAB RESULTS:      Lab 23  1818 23  0637 23  0615 23  1011 23  2054 23  1435 23  1113   WBC 9.06 7.16 10.04 12.83*  --   --  17.97*   HEMOGLOBIN 13.6 9.6* 12.6* 13.5  --   --  16.1   HEMATOCRIT 41.6 29.4* 38.0 39.8  --   --  46.8   PLATELETS 255 385 168 178  --   --  224   NEUTROS ABS  --   --   --  9.82*  --   --  15.56*   IMMATURE GRANS (ABS)  --   --   --  0.13*  --   --  0.26*   LYMPHS ABS  --   --   --  1.64  --   --  0.81   MONOS ABS  --   --   --  1.18*  --   --  1.19*   EOS ABS  --   --   --  0.01  --   --  0.12   MCV 93.1 105.0* 92.9 91.9  --   --  90.3   PROCALCITONIN  --   --   --   --   --   --  0.45*   LACTATE  --   --   --   --  1.3 2.2* 2.3*         Lab 23  0658 23  2046 23  0907 23  0637 23  0951 23  0615 23  1011 23  1113   SODIUM 134*  --   --  132* 136 137 134*  --  136    POTASSIUM 4.3 4.1 3.6 4.9 3.4* 3.5 3.6 2.8* 2.8*   CHLORIDE 100  --   --  101 98 101 98  --  97*   CO2 25.0  --   --  23.0 27.0 26.0 27.0  --  22.0   ANION GAP 9.0  --   --  8.0 11.0 10.0 9.0  --  17.0*   BUN 16  --   --  5* 13 19 20  --  35*   CREATININE 0.72*  --   --  0.76 0.79 0.76 0.82  --  1.18   EGFR 96.5  --   --  94.9 93.8 94.9 92.8  --  65.2   GLUCOSE 88  --   --  85 147* 90 115*  --  138*   CALCIUM 8.3*  --   --  7.8* 8.2* 8.5* 8.2*  --  8.8   MAGNESIUM 2.1  --  1.5*  --  1.5*  --  2.0 2.4 1.4*   PHOSPHORUS  --   --   --   --   --  3.4 2.2* 2.6  --    TSH  --   --   --   --   --   --   --   --  3.730         Lab 01/09/23  0658 01/08/23  0637 01/06/23 0615 01/05/23 1011 01/04/23  1113   TOTAL PROTEIN 5.9* 5.9* 5.5* 5.7* 7.2   ALBUMIN 2.8* 2.2* 3.0* 3.1* 3.4*   GLOBULIN 3.1 3.7 2.5 2.6 3.8   ALT (SGPT) 48* 34 28 27 17   AST (SGOT) 62* 74* 52* 62* 40   BILIRUBIN 0.4 0.9 0.5 0.7 1.3*   ALK PHOS 65 199* 66 73 73   LIPASE 46  --   --   --   --          Lab 01/05/23  1011 01/04/23 2054 01/04/23  1113   PROBNP 1,435.0*  --   --    TROPONIN T  --  0.010 <0.010             Lab 01/08/23 0637   FOLATE >20.00   VITAMIN B 12 1,624*         Brief Urine Lab Results  (Last result in the past 365 days)      Color   Clarity   Blood   Leuk Est   Nitrite   Protein   CREAT   Urine HCG        01/04/23 1200 Dark Yellow   Clear   Negative   Negative   Negative   30 mg/dL (1+)                 Microbiology Results Abnormal     Procedure Component Value - Date/Time    Blood Culture - Blood, Arm, Right [[id number]]  (Normal) Collected: 01/04/23 1115    Lab Status: Final result Specimen: Blood from Arm, Right Updated: 01/09/23 1145     Blood Culture No growth at 5 days    Blood Culture - Blood, Hand, Left [[id number]]  (Normal) Collected: 01/04/23 1120    Lab Status: Final result Specimen: Blood from Hand, Left Updated: 01/09/23 1145     Blood Culture No growth at 5 days    Legionella Antigen, Urine - Urine, Urine, Clean Catch  [[id number]]  (Normal) Collected: 01/04/23 2333    Lab Status: Final result Specimen: Urine, Clean Catch Updated: 01/05/23 1048     LEGIONELLA ANTIGEN, URINE Negative    S. Pneumo Ag Urine or CSF - Urine, Urine, Clean Catch [[id number]]  (Normal) Collected: 01/04/23 2333    Lab Status: Final result Specimen: Urine, Clean Catch Updated: 01/05/23 1048     Strep Pneumo Ag Negative    MRSA Screen, PCR (Inpatient) - Swab, Nares [[id number]]  (Normal) Collected: 01/04/23 2125    Lab Status: Final result Specimen: Swab from Nares Updated: 01/05/23 0955     MRSA PCR Negative    Narrative:      The negative predictive value of this diagnostic test is high and should only be used to consider de-escalating anti-MRSA therapy. A positive result may indicate colonization with MRSA and must be correlated clinically.  MRSA Negative    COVID PRE-OP / PRE-PROCEDURE SCREENING ORDER (NO ISOLATION) - Swab, Nasopharynx [[id number]]  (Normal) Collected: 01/04/23 1118    Lab Status: Final result Specimen: Swab from Nasopharynx Updated: 01/04/23 1212    Narrative:      The following orders were created for panel order COVID PRE-OP / PRE-PROCEDURE SCREENING ORDER (NO ISOLATION) - Swab, Nasopharynx.  Procedure                               Abnormality         Status                     ---------                               -----------         ------                     COVID-19, FLU A/B, RSV P...[[id number]]  Normal              Final result                 Please view results for these tests on the individual orders.    COVID-19, FLU A/B, RSV PCR - Swab, Nasopharynx [[id number]]  (Normal) Collected: 01/04/23 1118    Lab Status: Final result Specimen: Swab from Nasopharynx Updated: 01/04/23 1212     COVID19 Not Detected     Influenza A PCR Not Detected     Influenza B PCR Not Detected     RSV, PCR Not Detected    Narrative:      Fact sheet for providers: https://www.fda.gov/media/545870/download    Fact sheet for patients:  https://www.fda.gov/media/405755/download    Test performed by PCR.          CT Chest Without Contrast Diagnostic    Result Date: 1/9/2023  CT CHEST WO CONTRAST DIAGNOSTIC Date of Exam: 1/9/2023 4:28 PM EST Indication: Pneumonia, complication suspected, xray done. Comparison: 1/4/2023 Technique: Axial CT images were obtained of the chest without contrast administration.  Reconstructed coronal and sagittal images were also obtained. Automated exposure control and iterative construction methods were used. Findings: Multiple partially calcified mediastinal lymph nodes compatible changes of prior granulomatous disease. No mediastinal, hilar, or axillary adenopathy. There is large amount of coronary artery calcification. There is is mild cardiomegaly. There is a new small left pleural effusion. No right pleural effusion. There is persistent airspace disease throughout the left upper lobe. There has been worsening in atelectasis of the left upper lobe. There is also been worsening in airspace consolidation within the  left lower lobe which may be due to worsening pneumonia or atelectasis. There is minimal linear atelectasis within the right lower lobe. Bilateral adrenal glands are within normal limits. Nonobstructing stone is seen within the upper pole the left kidney. Degenerative changes are noted throughout the spine. No lytic or sclerotic bony lesions are identified.     Impression: Impression: 1. Worsening left-sided airspace disease compatible with worsening pneumonia and atelectasis. 2. New small left pleural effusion. Electronically Signed: Ulysses Kim  1/9/2023 8:44 PM EST  Workstation ID: [id number]      Results for orders placed during the hospital encounter of 01/04/23    Adult Transthoracic Echo Complete W/ Cont if Necessary Per Protocol    Interpretation Summary  •  Left ventricular systolic function is normal. Left ventricular ejection fraction appears to be 56 - 60%.  •  Left ventricular wall thickness is  consistent with mild to moderate concentric hypertrophy.  •  Mildly reduced right ventricular systolic function noted.  •  Left atrial volume is moderately increased.  •  The right atrial cavity is mildly dilated.  •  Mild to moderate aortic valve regurgitation is present.  •  Moderate mitral valve regurgitation is present.  •  Mild tricuspid valve regurgitation is present.  •  Estimated right ventricular systolic pressure from tricuspid regurgitation is mildly elevated (35-45 mmHg).  •  Incidental finding of a large anechoic structure adjacent or within the liver, measuring 21.0 x 12.8 cm.      I have reviewed the medications:  Scheduled Meds:aspirin, 81 mg, Oral, Daily  enoxaparin, 40 mg, Subcutaneous, Daily  guaifenesin, 200 mg, Oral, Q6H  hydrALAZINE, 25 mg, Oral, Q8H  ipratropium-albuterol, 3 mL, Nebulization, 4x Daily - RT  lidocaine, 1 patch, Transdermal, Q24H  metoprolol tartrate, 50 mg, Oral, Q12H  miconazole, , Topical, Q12H  piperacillin-tazobactam, 3.375 g, Intravenous, Once  piperacillin-tazobactam, 3.375 g, Intravenous, Q8H  senna-docusate sodium, 2 tablet, Oral, BID  sodium chloride, 10 mL, Intravenous, Q12H  sodium chloride, 4 mL, Nebulization, Once      Continuous Infusions:   PRN Meds:.•  acetaminophen  •  senna-docusate sodium **AND** polyethylene glycol **AND** bisacodyl **AND** bisacodyl  •  doxylamine  •  magnesium sulfate **OR** magnesium sulfate **OR** magnesium sulfate  •  melatonin  •  potassium & sodium phosphates **OR** potassium & sodium phosphates  •  potassium chloride **OR** potassium chloride **OR** potassium chloride  •  sodium chloride  •  sodium chloride  •  sodium chloride    Assessment & Plan   Assessment & Plan     Active Hospital Problems    Diagnosis  POA   • **Acute respiratory failure with hypoxia (HCC) [J96.01]  Yes   • Atrial flutter with controlled response (HCC) [I48.92]  Yes   • CAP (community acquired pneumonia) [J18.9]  Yes   • Sepsis, unspecified organism ([facility])  [A41.9]  Yes   • Moderate dehydration [E86.0]  Yes   • Hypokalemia [E87.6]  Yes   • Hypomagnesemia [E83.42]  Yes   • Physical deconditioning [R53.81]  Unknown      Resolved Hospital Problems   No resolved problems to display.        Brief Hospital Course to date:  Olu Henriquez is a 72 y.o. male with PMH significant for HTN, essential tremor and glaucoma. He has slowly lost his sight over the past few months and is now legally blind. He has fallen multiple times at home due to the blindness. Admitted for sepsis / acute hypoxic respiratory failure secondary to LML / BAILEE community acquired pneumonia. Cardiology consulted for atrial flutter and concerns for tachy-gale syndrome. ECHO incidentally revealed a large, anechoic structure adjacent or within the liver measuring 21 x 12.8cm.     Sepsis secondary to BAILEE/LML pneumonia  Acute respiratory failure with hypoxia  -completed a course of antibiotics  -now with decreased BS on L  -cough/pleurisy  -add zosyn, check MRSA PCR  -OPEP  -Saline nebs     Atrial flutter with RVR  Concern for tachy-gale syndrome   -BB for now with MCOT at DC per cardiology  -aspirin only for fall risk     Anemia  -Hgb stable    Anechoic liver lesion  Elevated alk phos, AST  -US reviewed, coarsened, nodular echotexture of liver concerning for cirrhosis, dilation of CBD   -needs hepatology follow up vs GI consult  -Denies abdominal pain     Hypomagnesemia  Hypokalemia  -Replace per protocol      Debility / frequent falls   -PT/OT following. He is requiring assist x 2 and recommend SNF rehab  -Case management following        Expected Discharge Location and Transportation: Home, car  Expected Discharge   Expected Discharge Date and Time     Expected Discharge Date Expected Discharge Time    Jan 11, 2023            DVT prophylaxis:  Medical DVT prophylaxis orders are present.     AM-PAC 6 Clicks Score (PT): 11 (01/09/23 2546)    CODE STATUS:   Code Status and Medical Interventions:   Ordered at:  01/04/23 2494     Level Of Support Discussed With:    Patient     Code Status (Patient has no pulse and is not breathing):    CPR (Attempt to Resuscitate)     Medical Interventions (Patient has pulse or is breathing):    Full Support       Albert Escalante MD  01/10/23

## 2023-01-10 NOTE — PROGRESS NOTES
[facility] at [facility]  Progress Note       LOS: 6 days   Patient Care Team:  Jesse De Anda MD as PCP - General (Family Medicine)    Chief Complaint:  Atrial flutter     Subjective     No new complaints this AM. Laying in bed, getting ready to eat breakfast. No current SOB. No CP. Cannot feel atrial flutter.         Review of Systems:   Pertinent positives in HPI, all others reviewed and negative.      Objective       Current Facility-Administered Medications:   •  acetaminophen (TYLENOL) tablet 650 mg, 650 mg, Oral, Q4H PRN, Concepcion Mccarthy MD, 650 mg at 01/09/23 1324  •  aspirin EC tablet 81 mg, 81 mg, Oral, Daily, Kenton Hartmann MD, 81 mg at 01/09/23 0855  •  sennosides-docusate (PERICOLACE) 8.6-50 MG per tablet 2 tablet, 2 tablet, Oral, BID, 2 tablet at 01/05/23 2144 **AND** polyethylene glycol (MIRALAX) packet 17 g, 17 g, Oral, Daily PRN **AND** bisacodyl (DULCOLAX) EC tablet 5 mg, 5 mg, Oral, Daily PRN **AND** bisacodyl (DULCOLAX) suppository 10 mg, 10 mg, Rectal, Daily PRN, Concepcion Mccarthy MD  •  doxylamine (UNISOM) tablet 25 mg, 25 mg, Oral, Nightly PRN, Kenton Hartmann MD, 25 mg at 01/10/23 0114  •  Enoxaparin Sodium (LOVENOX) syringe 40 mg, 40 mg, Subcutaneous, Daily, Concepcion Mccarthy MD, 40 mg at 01/09/23 0856  •  guaifenesin (ROBITUSSIN) 100 MG/5ML liquid 200 mg, 200 mg, Oral, Q6H, Marily Centeno APRN, 200 mg at 01/10/23 0459  •  hydrALAZINE (APRESOLINE) tablet 25 mg, 25 mg, Oral, Q8H, Albert Escalante MD, 25 mg at 01/10/23 0500  •  ipratropium-albuterol (DUO-NEB) nebulizer solution 3 mL, 3 mL, Nebulization, 4x Daily - RT, Marily Centeno APRN, 3 mL at 01/09/23 1304  •  lidocaine (LIDODERM) 5 % 1 patch, 1 patch, Transdermal, Q24H, Abdelrahman Oquendo-SATHISH Velazquez, 1 patch at 01/09/23 0856  •  Magnesium Sulfate 2 gram Bolus, followed by 8 gram infusion (total Mg dose 10 grams)- Mg less than or equal to 1mg/dL, 2 g, Intravenous, PRN **OR** Magnesium Sulfate 2 gram / 50mL  "Infusion (GIVE X 3 BAGS TO EQUAL 6GM TOTAL DOSE) - Mg 1.1 - 1.5 mg/dl, 2 g, Intravenous, PRN, Last Rate: 25 mL/hr at 01/08/23 1641, 2 g at 01/08/23 1641 **OR** Magnesium Sulfate 4 gram infusion- Mg 1.6-1.9 mg/dL, 4 g, Intravenous, PRN, Concepcion Mccarthy MD  •  melatonin tablet 5 mg, 5 mg, Oral, Nightly PRN, Floyd Oquendo PA-C, 5 mg at 01/10/23 0114  •  metoprolol tartrate (LOPRESSOR) tablet 50 mg, 50 mg, Oral, Q12H, Marco A Cortez MD, 50 mg at 01/09/23 2051  •  miconazole (MICOTIN) 2 % powder, , Topical, Q12H, Kenton Hartmann MD, Given at 01/09/23 2056  •  potassium & sodium phosphates (PHOS-NAK) 280-160-250 MG packet - for Phosphorus less than 1.25 mg/dL, 2 packet, Oral, Q6H PRN **OR** potassium & sodium phosphates (PHOS-NAK) 280-160-250 MG packet - for Phosphorus 1.25 - 2.5 mg/dL, 2 packet, Oral, Q6H PRN, Concepcion Mccarthy MD  •  potassium chloride (MICRO-K) CR capsule 40 mEq, 40 mEq, Oral, PRN, 40 mEq at 01/08/23 1641 **OR** potassium chloride (KLOR-CON) packet 40 mEq, 40 mEq, Oral, PRN **OR** potassium chloride 10 mEq in 100 mL IVPB, 10 mEq, Intravenous, Q1H PRN, Concepcion Mccarthy MD  •  sodium chloride 0.9 % flush 10 mL, 10 mL, Intravenous, PRN, Concepcion Mccarthy MD  •  sodium chloride 0.9 % flush 10 mL, 10 mL, Intravenous, Q12H, Concepcion Mccarthy MD, 10 mL at 01/09/23 2053  •  sodium chloride 0.9 % flush 10 mL, 10 mL, Intravenous, PRN, Concepcion Mccarthy MD  •  sodium chloride 0.9 % infusion 40 mL, 40 mL, Intravenous, PRN, Concepcion Mccarthy MD    Vital Sign Min/Max for last 24 hours  Temp  Min: 97 °F (36.1 °C)  Max: 99.1 °F (37.3 °C)   BP  Min: 139/79  Max: 160/77   Pulse  Min: 48  Max: 87   Resp  Min: 16  Max: 19   SpO2  Min: 92 %  Max: 96 %   Flow (L/min)  Min: 3  Max: 3   No data recorded     Flowsheet Rows    Flowsheet Row First Filed Value   Admission Height 177.8 cm (70\") Documented at 01/04/2023 1009   Admission Weight 77.6 kg (171 lb) Documented at 01/04/2023 1009            Intake/Output Summary (Last 24 hours) " at 1/10/2023 0749  Last data filed at 1/10/2023 0426  Gross per 24 hour   Intake 775 ml   Output 1700 ml   Net -925 ml       Physical Exam:     General Appearance:    Alert, cooperative, in no acute distress   Lungs:     Clear to auscultation,respirations regular, even and                  unlabored    Heart:    Irr, irr, , normal S1 and S2, no            murmur, no gallop, no rub, no click   Chest Wall:    No abnormalities observed   Abdomen:     Normal bowel sounds, no masses, no organomegaly, soft        non-tender, non-distended, no guarding, no rebound                tenderness   Extremities:   Moves all extremities well, no edema, no cyanosis, no             redness   Pulses:   Pulses palpable and equal bilaterally   Skin:   No bleeding, bruising or rash        Results Review:   Results from last 7 days   Lab Units 01/09/23  1818 01/08/23  0637 01/06/23  0615   WBC 10*3/mm3 9.06 7.16 10.04   HEMOGLOBIN g/dL 13.6 9.6* 12.6*   HEMATOCRIT % 41.6 29.4* 38.0   PLATELETS 10*3/mm3 255 385 168     Results from last 7 days   Lab Units 01/09/23  0658 01/08/23  2046 01/08/23  0907 01/08/23  0637 01/07/23  0951   SODIUM mmol/L 134*  --   --  132* 136   POTASSIUM mmol/L 4.3 4.1 3.6 4.9 3.4*   CHLORIDE mmol/L 100  --   --  101 98   CO2 mmol/L 25.0  --   --  23.0 27.0   BUN mg/dL 16  --   --  5* 13   CREATININE mg/dL 0.72*  --   --  0.76 0.79   GLUCOSE mg/dL 88  --   --  85 147*              Results from last 7 days   Lab Units 01/04/23  1113   TSH uIU/mL 3.730     Results from last 7 days   Lab Units 01/05/23  1011   PROBNP pg/mL 1,435.0*         Results from last 7 days   Lab Units 01/04/23 2054 01/04/23  1113   TROPONIN T ng/mL 0.010 <0.010       Intake/Output Summary (Last 24 hours) at 1/10/2023 0749  Last data filed at 1/10/2023 0426  Gross per 24 hour   Intake 775 ml   Output 1700 ml   Net -925 ml       I personally viewed and interpreted the patient's EKG/Telemetry data    EKG: none for review today.    Telemetry:  atrial flutter HR 48-87 bpm     Ejection Fraction  No results found for: EF    Echo EF Estimated  No results found for: ECHOEFEST      Present on Admission:  • Acute respiratory failure with hypoxia (HCC)  • CAP (community acquired pneumonia)  • Sepsis, unspecified organism (HCC)  • Moderate dehydration  • Hypokalemia  • Hypomagnesemia  • Atrial flutter with controlled response (HCC)    Assessment & Plan   1. 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    - Presently with adequate ventricular rates on metoprolol. He remains asymptomatic from his arrhythmia as well as from his heart rate.  - Will hold off on PM implant at this time, rates doing well on Metoprolol  - recommend MCOT at discharge and follow up as outpatient.    - Echocardiogram does demonstrate significant left atrial enlargement and for that reason,  ablation of his atrial flutter would probably have a low yield due to his increased risk for developing other atrial arrhythmias.    - He has a Dangelo Vasc Score of 3 and presently on aspirin. Long discussion regarding Eliquis versus aspirin.  The patient is not interested in pursuing oral anticoagulation due to his multiple falls, history of anemia and being blind.  Therefore we will stay on the aspirin for now.      Disposition: EP will see as needed. Follow up in 6 weeks as outpatient after wearing MCOT.     Electronically signed by AIDEN Chiu, 01/10/23, 7:50 AM EST.    I have read the above note and agree

## 2023-01-10 NOTE — PLAN OF CARE
Goal Outcome Evaluation:  Plan of Care Reviewed With: patient, sibling        Progress: improving       Pt is oriented x4. Pt complains of dyspnea and lower back pain with repositioning. Pt has been tolerating RA, satting in the low 90s. Patient remains Aflutter/Afib rate controlled, at times bradycardiac (50s). BP elevated up to the 140s. Pt will refuse to be turned and tilted due to being uncomfortable. IV Abx administered.

## 2023-01-10 NOTE — PLAN OF CARE
Goal Outcome Evaluation:  Plan of Care Reviewed With: patient        Progress: improving  Outcome Evaluation: Pt alert, oriented. Can become very anxious at times. VSS. Weaned to RA. C/o chronic pain in neck and back. Tylenol administered. Working on SNIF vs rehab. Plan of care discussed with pt. Verbalized understanding.

## 2023-01-11 ENCOUNTER — APPOINTMENT (OUTPATIENT)
Dept: GENERAL RADIOLOGY | Facility: HOSPITAL | Age: 73
DRG: 871 | End: 2023-01-11
Payer: MEDICARE

## 2023-01-11 DIAGNOSIS — G25.0 BENIGN ESSENTIAL TREMOR: ICD-10-CM

## 2023-01-11 DIAGNOSIS — I10 ESSENTIAL HYPERTENSION: ICD-10-CM

## 2023-01-11 LAB
ANION GAP SERPL CALCULATED.3IONS-SCNC: 11 MMOL/L (ref 5–15)
BUN SERPL-MCNC: 16 MG/DL (ref 8–23)
BUN/CREAT SERPL: 17.4 (ref 7–25)
CALCIUM SPEC-SCNC: 8.8 MG/DL (ref 8.6–10.5)
CHLORIDE SERPL-SCNC: 102 MMOL/L (ref 98–107)
CO2 SERPL-SCNC: 26 MMOL/L (ref 22–29)
CREAT SERPL-MCNC: 0.92 MG/DL (ref 0.76–1.27)
DEPRECATED RDW RBC AUTO: 45.8 FL (ref 37–54)
EGFRCR SERPLBLD CKD-EPI 2021: 87.8 ML/MIN/1.73
ERYTHROCYTE [DISTWIDTH] IN BLOOD BY AUTOMATED COUNT: 13.4 % (ref 12.3–15.4)
GLUCOSE SERPL-MCNC: 104 MG/DL (ref 65–99)
HCT VFR BLD AUTO: 46.6 % (ref 37.5–51)
HGB BLD-MCNC: 15.7 G/DL (ref 13–17.7)
MCH RBC QN AUTO: 31.3 PG (ref 26.6–33)
MCHC RBC AUTO-ENTMCNC: 33.7 G/DL (ref 31.5–35.7)
MCV RBC AUTO: 93 FL (ref 79–97)
PLATELET # BLD AUTO: 314 10*3/MM3 (ref 140–450)
PMV BLD AUTO: 10.5 FL (ref 6–12)
POTASSIUM SERPL-SCNC: 4.7 MMOL/L (ref 3.5–5.2)
RBC # BLD AUTO: 5.01 10*6/MM3 (ref 4.14–5.8)
SODIUM SERPL-SCNC: 139 MMOL/L (ref 136–145)
WBC NRBC COR # BLD: 8.67 10*3/MM3 (ref 3.4–10.8)

## 2023-01-11 PROCEDURE — 71045 X-RAY EXAM CHEST 1 VIEW: CPT

## 2023-01-11 PROCEDURE — 94799 UNLISTED PULMONARY SVC/PX: CPT

## 2023-01-11 PROCEDURE — 25010000002 ENOXAPARIN PER 10 MG: Performed by: STUDENT IN AN ORGANIZED HEALTH CARE EDUCATION/TRAINING PROGRAM

## 2023-01-11 PROCEDURE — 94761 N-INVAS EAR/PLS OXIMETRY MLT: CPT

## 2023-01-11 PROCEDURE — 99232 SBSQ HOSP IP/OBS MODERATE 35: CPT | Performed by: INTERNAL MEDICINE

## 2023-01-11 PROCEDURE — 25010000002 PIPERACILLIN SOD-TAZOBACTAM PER 1 G: Performed by: HOSPITALIST

## 2023-01-11 PROCEDURE — 99231 SBSQ HOSP IP/OBS SF/LOW 25: CPT | Performed by: HOSPITALIST

## 2023-01-11 PROCEDURE — 85027 COMPLETE CBC AUTOMATED: CPT | Performed by: HOSPITALIST

## 2023-01-11 PROCEDURE — 94664 DEMO&/EVAL PT USE INHALER: CPT

## 2023-01-11 PROCEDURE — 80048 BASIC METABOLIC PNL TOTAL CA: CPT | Performed by: HOSPITALIST

## 2023-01-11 RX ORDER — PROPRANOLOL HYDROCHLORIDE 40 MG/1
40 TABLET ORAL DAILY
Qty: 90 TABLET | Refills: 3 | OUTPATIENT
Start: 2023-01-11

## 2023-01-11 RX ORDER — AMOXICILLIN AND CLAVULANATE POTASSIUM 875; 125 MG/1; MG/1
1 TABLET, FILM COATED ORAL EVERY 12 HOURS SCHEDULED
Status: DISCONTINUED | OUTPATIENT
Start: 2023-01-11 | End: 2023-01-12 | Stop reason: HOSPADM

## 2023-01-11 RX ORDER — GUAIFENESIN 200 MG/10ML
200 LIQUID ORAL EVERY 6 HOURS
Status: DISCONTINUED | OUTPATIENT
Start: 2023-01-11 | End: 2023-01-12 | Stop reason: HOSPADM

## 2023-01-11 RX ADMIN — AMOXICILLIN AND CLAVULANATE POTASSIUM 1 TABLET: 875; 125 TABLET, FILM COATED ORAL at 20:32

## 2023-01-11 RX ADMIN — METOPROLOL TARTRATE 50 MG: 50 TABLET, FILM COATED ORAL at 20:35

## 2023-01-11 RX ADMIN — IPRATROPIUM BROMIDE AND ALBUTEROL SULFATE 3 ML: 2.5; .5 SOLUTION RESPIRATORY (INHALATION) at 09:27

## 2023-01-11 RX ADMIN — Medication 10 ML: at 09:13

## 2023-01-11 RX ADMIN — HYDRALAZINE HYDROCHLORIDE 25 MG: 25 TABLET, FILM COATED ORAL at 13:28

## 2023-01-11 RX ADMIN — GUAIFENESIN 200 MG: 200 SOLUTION ORAL at 05:22

## 2023-01-11 RX ADMIN — METOPROLOL TARTRATE 50 MG: 50 TABLET, FILM COATED ORAL at 09:04

## 2023-01-11 RX ADMIN — MICONAZOLE NITRATE: 20 POWDER TOPICAL at 09:08

## 2023-01-11 RX ADMIN — GUAIFENESIN 200 MG: 200 SOLUTION ORAL at 09:06

## 2023-01-11 RX ADMIN — HYDRALAZINE HYDROCHLORIDE 25 MG: 25 TABLET, FILM COATED ORAL at 20:32

## 2023-01-11 RX ADMIN — DOXYLAMINE SUCCINATE 25 MG: 25 TABLET ORAL at 02:28

## 2023-01-11 RX ADMIN — TAZOBACTAM SODIUM AND PIPERACILLIN SODIUM 3.38 G: 375; 3 INJECTION, SOLUTION INTRAVENOUS at 00:35

## 2023-01-11 RX ADMIN — Medication 10 ML: at 20:33

## 2023-01-11 RX ADMIN — MICONAZOLE NITRATE: 20 POWDER TOPICAL at 20:34

## 2023-01-11 RX ADMIN — TAZOBACTAM SODIUM AND PIPERACILLIN SODIUM 3.38 G: 375; 3 INJECTION, SOLUTION INTRAVENOUS at 09:02

## 2023-01-11 RX ADMIN — HYDRALAZINE HYDROCHLORIDE 25 MG: 25 TABLET, FILM COATED ORAL at 05:22

## 2023-01-11 RX ADMIN — GUAIFENESIN 200 MG: 200 SOLUTION ORAL at 13:28

## 2023-01-11 RX ADMIN — ENOXAPARIN SODIUM 40 MG: 40 INJECTION SUBCUTANEOUS at 09:06

## 2023-01-11 RX ADMIN — Medication 5 MG: at 02:28

## 2023-01-11 RX ADMIN — ASPIRIN 81 MG: 81 TABLET, COATED ORAL at 09:04

## 2023-01-11 NOTE — PLAN OF CARE
Problem: Fall Injury Risk  Goal: Absence of Fall and Fall-Related Injury  Intervention: Promote Injury-Free Environment  Recent Flowsheet Documentation  Taken 1/11/2023 0405 by Sindy Cruz RN  Safety Promotion/Fall Prevention:   activity supervised   assistive device/personal items within reach   safety round/check completed  Taken 1/11/2023 0210 by Sindy Cruz RN  Safety Promotion/Fall Prevention:   activity supervised   assistive device/personal items within reach   safety round/check completed  Taken 1/11/2023 0010 by Sindy Cruz RN  Safety Promotion/Fall Prevention:   activity supervised   assistive device/personal items within reach   safety round/check completed  Taken 1/10/2023 2210 by Sindy Cruz RN  Safety Promotion/Fall Prevention:   activity supervised   assistive device/personal items within reach   safety round/check completed  Taken 1/10/2023 2010 by Sindy Cruz RN  Safety Promotion/Fall Prevention:   activity supervised   assistive device/personal items within reach   safety round/check completed     Problem: Skin Injury Risk Increased  Goal: Skin Health and Integrity  Intervention: Optimize Skin Protection  Recent Flowsheet Documentation  Taken 1/11/2023 0405 by Sindy Crzu RN  Pressure Reduction Techniques: frequent weight shift encouraged  Head of Bed (HOB) Positioning: \[facility]\"" elevated  Pressure Reduction Devices: pressure-redistributing mattress utilized  Skin Protection:   adhesive use limited   incontinence pads utilized  Taken 1/11/2023 0210 by Sindy Cruz RN  Pressure Reduction Techniques: frequent weight shift encouraged  Head of Bed (HOB) Positioning: \[facility]\"" elevated  Pressure Reduction Devices: pressure-redistributing mattress utilized  Skin Protection:   adhesive use limited   incontinence pads utilized  Taken 1/11/2023 0010 by Sindy Cruz RN  Pressure Reduction Techniques: frequent weight shift encouraged  Head of Bed (HOB) Positioning: HOB elevated  Pressure Reduction  Devices: pressure-redistributing mattress utilized  Skin Protection:   adhesive use limited   incontinence pads utilized  Taken 1/10/2023 2210 by Sindy Cruz RN  Pressure Reduction Techniques: frequent weight shift encouraged  Head of Bed (HOB) Positioning: [facility] elevated  Pressure Reduction Devices: pressure-redistributing mattress utilized  Skin Protection:   adhesive use limited   incontinence pads utilized  Taken 1/10/2023 2010 by Sindy Cruz RN  Pressure Reduction Techniques: frequent weight shift encouraged  Head of Bed (HOB) Positioning: [facility] elevated  Pressure Reduction Devices: pressure-redistributing mattress utilized  Skin Protection:   adhesive use limited   incontinence pads utilized     Problem: Adult Inpatient Plan of Care  Goal: Absence of Hospital-Acquired Illness or Injury  Intervention: Identify and Manage Fall Risk  Recent Flowsheet Documentation  Taken 1/11/2023 0405 by Sindy Cruz RN  Safety Promotion/Fall Prevention:   activity supervised   assistive device/personal items within reach   safety round/check completed  Taken 1/11/2023 0210 by Sindy Cruz RN  Safety Promotion/Fall Prevention:   activity supervised   assistive device/personal items within reach   safety round/check completed  Taken 1/11/2023 0010 by Sindy Cruz RN  Safety Promotion/Fall Prevention:   activity supervised   assistive device/personal items within reach   safety round/check completed  Taken 1/10/2023 2210 by Sindy Cruz RN  Safety Promotion/Fall Prevention:   activity supervised   assistive device/personal items within reach   safety round/check completed  Taken 1/10/2023 2010 by Sindy Cruz RN  Safety Promotion/Fall Prevention:   activity supervised   assistive device/personal items within reach   safety round/check completed     Problem: Adult Inpatient Plan of Care  Goal: Absence of Hospital-Acquired Illness or Injury  Intervention: Prevent Skin Injury  Recent Flowsheet Documentation  Taken 1/11/2023 0405  by Sindy Cruz RN  Skin Protection:   adhesive use limited   incontinence pads utilized  Taken 1/11/2023 0210 by Sindy Cruz RN  Skin Protection:   adhesive use limited   incontinence pads utilized  Taken 1/11/2023 0010 by Sindy Cruz RN  Skin Protection:   adhesive use limited   incontinence pads utilized  Taken 1/10/2023 2210 by Sindy Cruz RN  Skin Protection:   adhesive use limited   incontinence pads utilized  Taken 1/10/2023 2010 by Sindy Cruz RN  Skin Protection:   adhesive use limited   incontinence pads utilized   Goal Outcome Evaluation:           Progress: improving

## 2023-01-11 NOTE — TELEPHONE ENCOUNTER
Rx Refill Note  Requested Prescriptions     Pending Prescriptions Disp Refills   • propranolol (INDERAL) 40 MG tablet 90 tablet 3     Sig: Take 1 tablet by mouth Daily.      Last office visit with prescribing clinician: Visit date not found   Last telemedicine visit with prescribing clinician: Visit date not found   Next office visit with prescribing clinician: Visit date not found                         Would you like a call back once the refill request has been completed: [] Yes [] No    If the office needs to give you a call back, can they leave a voicemail: [] Yes [] No    Jessi Ji MA  01/11/23, 10:57 EST

## 2023-01-11 NOTE — CASE MANAGEMENT/SOCIAL WORK
Case Management Discharge Note      Final Note: Spoke with Martha at [facility].  They will have a bed ready on Thurs, 1/12/23.  RN to call report to [phone] and fax DC summary to [phone].  No Covid test required.  [facility] van to transport at noon on Thurs.  They will  pt in room.  CM updated pt's brother, Jamison, via phone per pt request.         Selected Continued Care - Admitted Since 1/4/2023     Destination Coordination complete.    Service Provider Selected Services Address Phone Fax Patient Preferred    [address] [address] [phone] --          Durable Medical Equipment    No services have been selected for the patient.              Dialysis/Infusion    No services have been selected for the patient.              Home Medical Care    No services have been selected for the patient.              Therapy    No services have been selected for the patient.              Community Resources    No services have been selected for the patient.              Community & DME    No services have been selected for the patient.                  Transportation Services  W/C Van:  ([facility])    Final Discharge Disposition Code: 03 - skilled nursing facility (SNF)

## 2023-01-11 NOTE — PROGRESS NOTES
[facility] at [facility]  IP Progress Note      Chief Complaint/Reason for service: #1 atrial flutter #2 tachybradycardia syndrome    Subjective   Subjective: Patient is sleeping on his left side.    Past medical, surgical, social and family history reviewed in the patient's electronic medical record.    Objective     Vital Sign Min/Max for last 24 hours  Temp  Min: 97.9 °F (36.6 °C)  Max: 99 °F (37.2 °C)   BP  Min: 133/82  Max: 174/89   Pulse  Min: 57  Max: 95   Resp  Min: 16  Max: 18   SpO2  Min: 90 %  Max: 95 %   No data recorded      Intake/Output Summary (Last 24 hours) at 1/11/2023 0652  Last data filed at 1/11/2023 0300  Gross per 24 hour   Intake 700 ml   Output 2600 ml   Net -1900 ml             Current Facility-Administered Medications:   •  acetaminophen (TYLENOL) tablet 650 mg, 650 mg, Oral, Q4H PRN, Concepcion Mccarthy MD, 650 mg at 01/10/23 1632  •  aspirin EC tablet 81 mg, 81 mg, Oral, Daily, Kenton Hartmann MD, 81 mg at 01/10/23 0810  •  sennosides-docusate (PERICOLACE) 8.6-50 MG per tablet 2 tablet, 2 tablet, Oral, BID, 2 tablet at 01/05/23 2144 **AND** polyethylene glycol (MIRALAX) packet 17 g, 17 g, Oral, Daily PRN **AND** bisacodyl (DULCOLAX) EC tablet 5 mg, 5 mg, Oral, Daily PRN **AND** bisacodyl (DULCOLAX) suppository 10 mg, 10 mg, Rectal, Daily PRN, Concepcion Mccarthy MD  •  doxylamine (UNISOM) tablet 25 mg, 25 mg, Oral, Nightly PRN, Kenton Hartmann MD, 25 mg at 01/11/23 0228  •  Enoxaparin Sodium (LOVENOX) syringe 40 mg, 40 mg, Subcutaneous, Daily, Concepcion Mccarthy MD, 40 mg at 01/10/23 0809  •  guaifenesin (ROBITUSSIN) 100 MG/5ML liquid 200 mg, 200 mg, Oral, Q6H, Marily Centeno APRN, 200 mg at 01/11/23 0522  •  hydrALAZINE (APRESOLINE) tablet 25 mg, 25 mg, Oral, Q8H, Albert Escalante MD, 25 mg at 01/11/23 0522  •  ipratropium-albuterol (DUO-NEB) nebulizer solution 3 mL, 3 mL, Nebulization, 4x Daily - RT, Marily Centeno APRN, 3 mL at 01/10/23 0816  •  lidocaine  (LIDODERM) 5 % 1 patch, 1 patch, Transdermal, Q24H, Floyd Oquendo PA-C, 1 patch at 01/10/23 0813  •  Magnesium Sulfate 2 gram Bolus, followed by 8 gram infusion (total Mg dose 10 grams)- Mg less than or equal to 1mg/dL, 2 g, Intravenous, PRN **OR** Magnesium Sulfate 2 gram / 50mL Infusion (GIVE X 3 BAGS TO EQUAL 6GM TOTAL DOSE) - Mg 1.1 - 1.5 mg/dl, 2 g, Intravenous, PRN, Last Rate: 25 mL/hr at 01/08/23 1641, 2 g at 01/08/23 1641 **OR** Magnesium Sulfate 4 gram infusion- Mg 1.6-1.9 mg/dL, 4 g, Intravenous, PRN, Concepcion Mccarthy MD  •  melatonin tablet 5 mg, 5 mg, Oral, Nightly PRN, Floyd Oquendo PA-C, 5 mg at 01/11/23 0228  •  metoprolol tartrate (LOPRESSOR) tablet 50 mg, 50 mg, Oral, Q12H, Marco A Cortez MD, 50 mg at 01/10/23 0810  •  miconazole (MICOTIN) 2 % powder, , Topical, Q12H, Kenton Hartmann MD, Given at 01/10/23 2027  •  piperacillin-tazobactam (ZOSYN) 3.375 g in iso-osmotic dextrose 50 ml (premix), 3.375 g, Intravenous, Q8H, Albert Escalante MD, 3.375 g at 01/11/23 0035  •  potassium & sodium phosphates (PHOS-NAK) 280-160-250 MG packet - for Phosphorus less than 1.25 mg/dL, 2 packet, Oral, Q6H PRN **OR** potassium & sodium phosphates (PHOS-NAK) 280-160-250 MG packet - for Phosphorus 1.25 - 2.5 mg/dL, 2 packet, Oral, Q6H PRN, Concepcion Mccarthy MD  •  potassium chloride (MICRO-K) CR capsule 40 mEq, 40 mEq, Oral, PRN, 40 mEq at 01/08/23 1641 **OR** potassium chloride (KLOR-CON) packet 40 mEq, 40 mEq, Oral, PRN **OR** potassium chloride 10 mEq in 100 mL IVPB, 10 mEq, Intravenous, Q1H PRN, Concepcion Mccarthy MD  •  sodium chloride 0.9 % flush 10 mL, 10 mL, Intravenous, PRN, Concepcion Mccarthy MD  •  sodium chloride 0.9 % flush 10 mL, 10 mL, Intravenous, Q12H, Concepcion Mccarthy MD, 10 mL at 01/10/23 2027  •  sodium chloride 0.9 % flush 10 mL, 10 mL, Intravenous, PRN, Concepcion Mccarthy MD  •  sodium chloride 0.9 % infusion 40 mL, 40 mL, Intravenous, PRN, Concepcion Mccarthy MD    Physical Exam: General  well-developed male sleeping on his left side current systolic blood pressure 174 heart rate 75              Respiratory: Equal bilateral symmetrical expansion with a rare crackle in the right             Lower Extremities: No lesions       Neuro: Patient sleeping       Skin: Warm and dry        Results Review: Patient is a net -3.3 L since admission.    Radiology Results:  Imaging Results (Last 72 Hours)     Procedure Component Value Units Date/Time    XR Chest 1 View [[id number]] Resulted: 01/11/23 0416     Updated: 01/11/23 0515    CT Chest Without Contrast Diagnostic [[id number]] Collected: 01/09/23 2040     Updated: 01/09/23 2046    Narrative:      CT CHEST WO CONTRAST DIAGNOSTIC    Date of Exam: 1/9/2023 4:28 PM EST    Indication: Pneumonia, complication suspected, xray done.    Comparison: 1/4/2023       Technique: Axial CT images were obtained of the chest without contrast administration.  Reconstructed coronal and sagittal images were also obtained. Automated exposure control and iterative construction methods were used.    Findings:  Multiple partially calcified mediastinal lymph nodes compatible changes of prior granulomatous disease. No mediastinal, hilar, or axillary adenopathy. There is large amount of coronary artery calcification. There is is mild cardiomegaly. There is a new   small left pleural effusion. No right pleural effusion. There is persistent airspace disease throughout the left upper lobe. There has been worsening in atelectasis of the left upper lobe. There is also been worsening in airspace consolidation within the   left lower lobe which may be due to worsening pneumonia or atelectasis. There is minimal linear atelectasis within the right lower lobe.    Bilateral adrenal glands are within normal limits. Nonobstructing stone is seen within the upper pole the left kidney.    Degenerative changes are noted throughout the spine. No lytic or sclerotic bony lesions are identified.         Impression:      Impression:    1. Worsening left-sided airspace disease compatible with worsening pneumonia and atelectasis.  2. New small left pleural effusion.    Electronically Signed: Ulysses Kim    1/9/2023 8:44 PM EST    Workstation ID: [id number]    XR Chest 1 View [[id number]] Collected: 01/08/23 0953     Updated: 01/08/23 0957    Narrative:      XR CHEST 1 VW    Date of Exam: 1/8/2023 5:00 AM EST    Indication: DYSPNEA, ON EXERTION  dyspnea.    Comparison: 1/4/2023    Findings:  Cardiac size is unchanged. There is increasing left-sided pleural fluid. There is increasing volume loss in the left lung. There continues to be infiltrate in the left base. The right lung is clear. There are multiple old healed right-sided rib   fractures.      Impression:      Impression:    1. Increasing left-sided pleural fluid with increasing volume loss in the left lung. Persistent left-sided pulmonary infiltrate.    Electronically Signed: Jamison Ford    1/8/2023 9:55 AM EST    Workstation ID: [id number]          EKG: Atrial flutter right bundle branch block    ECHO: EF 56 to 60% with left atrial enlargement    Tele: Atrial flutter occasional RVR last episode 1/10/2023    Assessment   Assessment/Plan: #1 a flutter-due to frequent falls patient is not a candidate for anticoagulation  2 tachybradycardia-this is improved.  Continue metoprolol.  We will see as needed    Glenn Cruz MD  01/11/23  06:52 EST

## 2023-01-11 NOTE — PROGRESS NOTES
[facility] Medicine Services  PROGRESS NOTE    Patient Name: Olu Henriquez  : 1950  MRN: [id number]    Date of Admission: 2023  Primary Care Physician: Jesse De Anda MD    Subjective   Subjective     CC:  F/u PNA    HPI: In bed. Cough dry. NO SOA. Sleeping poorly. INtermittent pleurisy again.    Review of Systems   Constitutional: Positive for activity change and fatigue.   HENT: Positive for congestion.    Respiratory: Negative for chest tightness and shortness of breath.         Pleurisy   Gastrointestinal: Negative.    Genitourinary: Negative.    Neurological: Negative.    Psychiatric/Behavioral: Positive for sleep disturbance.       Objective   Objective     Vital Signs:   Temp:  [97.9 °F (36.6 °C)-98.8 °F (37.1 °C)] 98.8 °F (37.1 °C)  Heart Rate:  [] 90  Resp:  [16-18] 18  BP: (129-174)/(68-89) 129/75     Physical Exam:   NAD, alert and oriented  OP clear, MMM  Neck supple  No LAD  RRR  Improved BS at both bases  +BS, ND, NT, soft  SOTELO  Normal affect  No rashes  NO change in exam since 1/10 otherwise    Results Reviewed:  LAB RESULTS:      Lab 23  0834 23  1818 23  0637 23  0615 23  1011 23  2054 23  1435 23  1113   WBC 8.67 9.06 7.16 10.04 12.83*  --   --  17.97*   HEMOGLOBIN 15.7 13.6 9.6* 12.6* 13.5  --   --  16.1   HEMATOCRIT 46.6 41.6 29.4* 38.0 39.8  --   --  46.8   PLATELETS 314 255 385 168 178  --   --  224   NEUTROS ABS  --   --   --   --  9.82*  --   --  15.56*   IMMATURE GRANS (ABS)  --   --   --   --  0.13*  --   --  0.26*   LYMPHS ABS  --   --   --   --  1.64  --   --  0.81   MONOS ABS  --   --   --   --  1.18*  --   --  1.19*   EOS ABS  --   --   --   --  0.01  --   --  0.12   MCV 93.0 93.1 105.0* 92.9 91.9  --   --  90.3   PROCALCITONIN  --   --   --   --   --   --   --  0.45*   LACTATE  --   --   --   --   --  1.3 2.2* 2.3*         Lab 23  0834 23  0658 23  0907  01/08/23  0637 01/07/23  0951 01/06/23  0615 01/05/23  1011 01/04/23 2054 01/04/23  1113   SODIUM 139 134*  --   --  132* 136 137 134*  --  136   POTASSIUM 4.7 4.3 4.1 3.6 4.9 3.4* 3.5 3.6 2.8* 2.8*   CHLORIDE 102 100  --   --  101 98 101 98  --  97*   CO2 26.0 25.0  --   --  23.0 27.0 26.0 27.0  --  22.0   ANION GAP 11.0 9.0  --   --  8.0 11.0 10.0 9.0  --  17.0*   BUN 16 16  --   --  5* 13 19 20  --  35*   CREATININE 0.92 0.72*  --   --  0.76 0.79 0.76 0.82  --  1.18   EGFR 87.8 96.5  --   --  94.9 93.8 94.9 92.8  --  65.2   GLUCOSE 104* 88  --   --  85 147* 90 115*  --  138*   CALCIUM 8.8 8.3*  --   --  7.8* 8.2* 8.5* 8.2*  --  8.8   MAGNESIUM  --  2.1  --  1.5*  --  1.5*  --  2.0 2.4 1.4*   PHOSPHORUS  --   --   --   --   --   --  3.4 2.2* 2.6  --    TSH  --   --   --   --   --   --   --   --   --  3.730         Lab 01/09/23  0658 01/08/23  0637 01/06/23  0615 01/05/23 1011 01/04/23  1113   TOTAL PROTEIN 5.9* 5.9* 5.5* 5.7* 7.2   ALBUMIN 2.8* 2.2* 3.0* 3.1* 3.4*   GLOBULIN 3.1 3.7 2.5 2.6 3.8   ALT (SGPT) 48* 34 28 27 17   AST (SGOT) 62* 74* 52* 62* 40   BILIRUBIN 0.4 0.9 0.5 0.7 1.3*   ALK PHOS 65 199* 66 73 73   LIPASE 46  --   --   --   --          Lab 01/05/23  1011 01/04/23  2054 01/04/23  1113   PROBNP 1,435.0*  --   --    TROPONIN T  --  0.010 <0.010             Lab 01/08/23  0637   FOLATE >20.00   VITAMIN B 12 1,624*         Brief Urine Lab Results  (Last result in the past 365 days)      Color   Clarity   Blood   Leuk Est   Nitrite   Protein   CREAT   Urine HCG        01/04/23 1200 Dark Yellow   Clear   Negative   Negative   Negative   30 mg/dL (1+)                 Microbiology Results Abnormal     Procedure Component Value - Date/Time    MRSA Screen, PCR (Inpatient) - Swab, Nares [[id number]]  (Normal) Collected: 01/10/23 1024    Lab Status: Final result Specimen: Swab from Nares Updated: 01/10/23 1202     MRSA PCR Negative    Narrative:      The negative predictive value of this diagnostic test is  high and should only be used to consider de-escalating anti-MRSA therapy. A positive result may indicate colonization with MRSA and must be correlated clinically.  MRSA Negative    Blood Culture - Blood, Arm, Right [[id number]]  (Normal) Collected: 01/04/23 1115    Lab Status: Final result Specimen: Blood from Arm, Right Updated: 01/09/23 1145     Blood Culture No growth at 5 days    Blood Culture - Blood, Hand, Left [[id number]]  (Normal) Collected: 01/04/23 1120    Lab Status: Final result Specimen: Blood from Hand, Left Updated: 01/09/23 1145     Blood Culture No growth at 5 days    Legionella Antigen, Urine - Urine, Urine, Clean Catch [[id number]]  (Normal) Collected: 01/04/23 2333    Lab Status: Final result Specimen: Urine, Clean Catch Updated: 01/05/23 1048     LEGIONELLA ANTIGEN, URINE Negative    S. Pneumo Ag Urine or CSF - Urine, Urine, Clean Catch [[id number]]  (Normal) Collected: 01/04/23 2333    Lab Status: Final result Specimen: Urine, Clean Catch Updated: 01/05/23 1048     Strep Pneumo Ag Negative    MRSA Screen, PCR (Inpatient) - Swab, Nares [[id number]]  (Normal) Collected: 01/04/23 2125    Lab Status: Final result Specimen: Swab from Nares Updated: 01/05/23 0955     MRSA PCR Negative    Narrative:      The negative predictive value of this diagnostic test is high and should only be used to consider de-escalating anti-MRSA therapy. A positive result may indicate colonization with MRSA and must be correlated clinically.  MRSA Negative    COVID PRE-OP / PRE-PROCEDURE SCREENING ORDER (NO ISOLATION) - Swab, Nasopharynx [[id number]]  (Normal) Collected: 01/04/23 1118    Lab Status: Final result Specimen: Swab from Nasopharynx Updated: 01/04/23 1212    Narrative:      The following orders were created for panel order COVID PRE-OP / PRE-PROCEDURE SCREENING ORDER (NO ISOLATION) - Swab, Nasopharynx.  Procedure                               Abnormality         Status                     ---------                                -----------         ------                     COVID-19, FLU A/B, RSV P...[[id number]]  Normal              Final result                 Please view results for these tests on the individual orders.    COVID-19, FLU A/B, RSV PCR - Swab, Nasopharynx [[id number]]  (Normal) Collected: 01/04/23 1118    Lab Status: Final result Specimen: Swab from Nasopharynx Updated: 01/04/23 1212     COVID19 Not Detected     Influenza A PCR Not Detected     Influenza B PCR Not Detected     RSV, PCR Not Detected    Narrative:      Fact sheet for providers: https://www.fda.gov/media/807078/download    Fact sheet for patients: https://www.fda.gov/media/297325/download    Test performed by PCR.          CT Chest Without Contrast Diagnostic    Result Date: 1/9/2023  CT CHEST WO CONTRAST DIAGNOSTIC Date of Exam: 1/9/2023 4:28 PM EST Indication: Pneumonia, complication suspected, xray done. Comparison: 1/4/2023 Technique: Axial CT images were obtained of the chest without contrast administration.  Reconstructed coronal and sagittal images were also obtained. Automated exposure control and iterative construction methods were used. Findings: Multiple partially calcified mediastinal lymph nodes compatible changes of prior granulomatous disease. No mediastinal, hilar, or axillary adenopathy. There is large amount of coronary artery calcification. There is is mild cardiomegaly. There is a new small left pleural effusion. No right pleural effusion. There is persistent airspace disease throughout the left upper lobe. There has been worsening in atelectasis of the left upper lobe. There is also been worsening in airspace consolidation within the  left lower lobe which may be due to worsening pneumonia or atelectasis. There is minimal linear atelectasis within the right lower lobe. Bilateral adrenal glands are within normal limits. Nonobstructing stone is seen within the upper pole the left kidney. Degenerative changes are noted throughout  the spine. No lytic or sclerotic bony lesions are identified.     Impression: Impression: 1. Worsening left-sided airspace disease compatible with worsening pneumonia and atelectasis. 2. New small left pleural effusion. Electronically Signed: Ulysses Kim  1/9/2023 8:44 PM EST  Workstation ID: [id number]    XR Chest 1 View    Result Date: 1/11/2023  XR CHEST 1 VW Date of Exam: 1/11/2023 4:44 AM EST Indication: DYSPNEA, ON EXERTION dyspnea. Comparison: 1/8/2023 at 0445 hours Findings: There is improved aeration throughout the left lung. Residual multifocal infiltrates are noted. There may be a small left pleural effusion. The right lung is clear. The cardiac silhouette and mediastinum are stable.     Impression: Impression: Improved aeration throughout the left lung. Residual infiltrates are noted. There is a small left pleural effusion. Electronically Signed: Umer Kc  1/11/2023 10:40 AM EST  Workstation ID: [id number]      Results for orders placed during the hospital encounter of 01/04/23    Adult Transthoracic Echo Complete W/ Cont if Necessary Per Protocol    Interpretation Summary  •  Left ventricular systolic function is normal. Left ventricular ejection fraction appears to be 56 - 60%.  •  Left ventricular wall thickness is consistent with mild to moderate concentric hypertrophy.  •  Mildly reduced right ventricular systolic function noted.  •  Left atrial volume is moderately increased.  •  The right atrial cavity is mildly dilated.  •  Mild to moderate aortic valve regurgitation is present.  •  Moderate mitral valve regurgitation is present.  •  Mild tricuspid valve regurgitation is present.  •  Estimated right ventricular systolic pressure from tricuspid regurgitation is mildly elevated (35-45 mmHg).  •  Incidental finding of a large anechoic structure adjacent or within the liver, measuring 21.0 x 12.8 cm.      I have reviewed the medications:  Scheduled Meds:amoxicillin-clavulanate, 1 tablet, Oral,  Q12H  aspirin, 81 mg, Oral, Daily  enoxaparin, 40 mg, Subcutaneous, Daily  guaifenesin, 200 mg, Oral, Q6H  hydrALAZINE, 25 mg, Oral, Q8H  ipratropium-albuterol, 3 mL, Nebulization, 4x Daily - RT  lidocaine, 1 patch, Transdermal, Q24H  metoprolol tartrate, 50 mg, Oral, Q12H  miconazole, , Topical, Q12H  senna-docusate sodium, 2 tablet, Oral, BID  sodium chloride, 10 mL, Intravenous, Q12H      Continuous Infusions:   PRN Meds:.•  acetaminophen  •  senna-docusate sodium **AND** polyethylene glycol **AND** bisacodyl **AND** bisacodyl  •  doxylamine  •  magnesium sulfate **OR** magnesium sulfate **OR** magnesium sulfate  •  melatonin  •  potassium & sodium phosphates **OR** potassium & sodium phosphates  •  potassium chloride **OR** potassium chloride **OR** potassium chloride  •  sodium chloride  •  sodium chloride  •  sodium chloride    Assessment & Plan   Assessment & Plan     Active Hospital Problems    Diagnosis  POA   • **Acute respiratory failure with hypoxia (HCC) [J96.01]  Yes   • Atrial flutter with controlled response (HCC) [I48.92]  Yes   • CAP (community acquired pneumonia) [J18.9]  Yes   • Sepsis, unspecified organism (HCC) [A41.9]  Yes   • Moderate dehydration [E86.0]  Yes   • Hypokalemia [E87.6]  Yes   • Hypomagnesemia [E83.42]  Yes   • Physical deconditioning [R53.81]  Unknown      Resolved Hospital Problems   No resolved problems to display.        Brief Hospital Course to date:  Olu Henriquez is a 72 y.o. male with PMH significant for HTN, essential tremor and glaucoma. He has slowly lost his sight over the past few months and is now legally blind. He has fallen multiple times at home due to the blindness. Admitted for sepsis / acute hypoxic respiratory failure secondary to LML / BAILEE community acquired pneumonia. Cardiology consulted for atrial flutter and concerns for tachy-gale syndrome. ECHO incidentally revealed a large, anechoic structure adjacent or within the liver measuring 21 x 12.8cm.      Sepsis secondary to BAILEE/LML pneumonia, now with LLL PNA  Acute respiratory failure with hypoxia  -improved BS B  -cough/pleurisy  -added zosyn, CXR improved, on RA, transition to Augmentin  -OPEP     Atrial flutter with RVR  Concern for tachy-gale syndrome   -BB for now with MCOT at DC per cardiology  -aspirin only for fall risk     Anemia  -Hgb stable    Anechoic liver lesion  Elevated alk phos, AST  -US reviewed, coarsened, nodular echotexture of liver concerning for cirrhosis, dilation of CBD   -needs hepatology follow up vs GI consult  -Denies abdominal pain     Hypomagnesemia  Hypokalemia  -Replace per protocol      Debility / frequent falls   -PT/OT following. He is requiring assist x 2 and recommend SNF rehab  -Case management following        Expected Discharge Location and Transportation: Home, car  Expected Discharge   Expected Discharge Date and Time     Expected Discharge Date Expected Discharge Time    Jan 12, 2023            DVT prophylaxis:  Medical DVT prophylaxis orders are present.     AM-PAC 6 Clicks Score (PT): 11 (01/10/23 0810)    CODE STATUS:   Code Status and Medical Interventions:   Ordered at: 01/04/23 1452     Level Of Support Discussed With:    Patient     Code Status (Patient has no pulse and is not breathing):    CPR (Attempt to Resuscitate)     Medical Interventions (Patient has pulse or is breathing):    Full Support       Albert Escalante MD  01/11/23

## 2023-01-11 NOTE — PLAN OF CARE
Goal Outcome Evaluation:           Progress: improving  Outcome Evaluation: Pt alert aqnd oriented. Set to DC tomorrow to [facility] in [place]. [facility] van will  in room at noon tomorrow.  VSS no complaints of pain.

## 2023-01-12 ENCOUNTER — TELEPHONE (OUTPATIENT)
Dept: FAMILY MEDICINE CLINIC | Facility: CLINIC | Age: 73
End: 2023-01-12
Payer: MEDICARE

## 2023-01-12 VITALS
WEIGHT: 171.08 LBS | HEIGHT: 70 IN | BODY MASS INDEX: 24.49 KG/M2 | HEART RATE: 79 BPM | DIASTOLIC BLOOD PRESSURE: 96 MMHG | OXYGEN SATURATION: 95 % | RESPIRATION RATE: 17 BRPM | SYSTOLIC BLOOD PRESSURE: 153 MMHG | TEMPERATURE: 98.8 F

## 2023-01-12 DIAGNOSIS — I48.0 PAROXYSMAL ATRIAL FIBRILLATION: Primary | ICD-10-CM

## 2023-01-12 PROBLEM — E87.6 HYPOKALEMIA: Status: RESOLVED | Noted: 2023-01-04 | Resolved: 2023-01-12

## 2023-01-12 PROBLEM — A41.9 SEPSIS, UNSPECIFIED ORGANISM (HCC): Status: RESOLVED | Noted: 2023-01-04 | Resolved: 2023-01-12

## 2023-01-12 PROBLEM — E83.42 HYPOMAGNESEMIA: Status: RESOLVED | Noted: 2023-01-04 | Resolved: 2023-01-12

## 2023-01-12 PROBLEM — E86.0 MODERATE DEHYDRATION: Status: RESOLVED | Noted: 2023-01-04 | Resolved: 2023-01-12

## 2023-01-12 PROBLEM — J96.01 ACUTE RESPIRATORY FAILURE WITH HYPOXIA: Status: RESOLVED | Noted: 2023-01-04 | Resolved: 2023-01-12

## 2023-01-12 PROBLEM — J18.9 CAP (COMMUNITY ACQUIRED PNEUMONIA): Status: RESOLVED | Noted: 2023-01-04 | Resolved: 2023-01-12

## 2023-01-12 PROCEDURE — 99239 HOSP IP/OBS DSCHRG MGMT >30: CPT | Performed by: FAMILY MEDICINE

## 2023-01-12 PROCEDURE — 25010000002 ENOXAPARIN PER 10 MG: Performed by: STUDENT IN AN ORGANIZED HEALTH CARE EDUCATION/TRAINING PROGRAM

## 2023-01-12 RX ORDER — HYDRALAZINE HYDROCHLORIDE 25 MG/1
25 TABLET, FILM COATED ORAL EVERY 8 HOURS SCHEDULED
Qty: 90 TABLET | Refills: 0 | Status: SHIPPED | OUTPATIENT
Start: 2023-01-12

## 2023-01-12 RX ORDER — LIDOCAINE 50 MG/G
1 PATCH TOPICAL
Qty: 30 PATCH | Refills: 0 | Status: SHIPPED | OUTPATIENT
Start: 2023-01-12

## 2023-01-12 RX ORDER — CHOLECALCIFEROL (VITAMIN D3) 125 MCG
5 CAPSULE ORAL NIGHTLY PRN
Qty: 30 TABLET | Refills: 0 | Status: SHIPPED | OUTPATIENT
Start: 2023-01-12 | End: 2023-02-23

## 2023-01-12 RX ORDER — AMOXICILLIN AND CLAVULANATE POTASSIUM 875; 125 MG/1; MG/1
1 TABLET, FILM COATED ORAL EVERY 12 HOURS SCHEDULED
Qty: 9 TABLET | Refills: 0 | Status: SHIPPED | OUTPATIENT
Start: 2023-01-12 | End: 2023-01-17

## 2023-01-12 RX ORDER — METOPROLOL TARTRATE 50 MG/1
50 TABLET, FILM COATED ORAL EVERY 12 HOURS SCHEDULED
Qty: 60 TABLET | Refills: 0 | Status: SHIPPED | OUTPATIENT
Start: 2023-01-12

## 2023-01-12 RX ADMIN — HYDRALAZINE HYDROCHLORIDE 25 MG: 25 TABLET, FILM COATED ORAL at 06:08

## 2023-01-12 RX ADMIN — METOPROLOL TARTRATE 50 MG: 50 TABLET, FILM COATED ORAL at 09:38

## 2023-01-12 RX ADMIN — LIDOCAINE 1 PATCH: 50 PATCH CUTANEOUS at 09:38

## 2023-01-12 RX ADMIN — GUAIFENESIN 200 MG: 200 SOLUTION ORAL at 06:08

## 2023-01-12 RX ADMIN — AMOXICILLIN AND CLAVULANATE POTASSIUM 1 TABLET: 875; 125 TABLET, FILM COATED ORAL at 09:38

## 2023-01-12 RX ADMIN — ENOXAPARIN SODIUM 40 MG: 40 INJECTION SUBCUTANEOUS at 09:39

## 2023-01-12 RX ADMIN — GUAIFENESIN 200 MG: 200 SOLUTION ORAL at 00:01

## 2023-01-12 RX ADMIN — DOXYLAMINE SUCCINATE 25 MG: 25 TABLET ORAL at 00:01

## 2023-01-12 RX ADMIN — Medication 5 MG: at 00:01

## 2023-01-12 RX ADMIN — ASPIRIN 81 MG: 81 TABLET, COATED ORAL at 09:37

## 2023-01-12 NOTE — DISCHARGE PLACEMENT REQUEST
"Linnea Reeder, RN    [phone]                              Olu Henriquez (72 y.o. Male)     Date of Birth   1950    Social Security Number       Address   [address] [facility] [address]    Home Phone   [phone]    MRN   [id number]       [facility]    Marital Status                               Admission Date   1/4/23    Admission Type   Emergency    Admitting Provider   Janine Sargent DO    Attending Provider   Janine Sargent DO    Department, Room/Bed   [facility] 4G, S451/1       Discharge Date       Discharge Disposition   Skilled Nursing Facility (DC - External)    Discharge Destination                               Attending Provider: Janine Sargent DO    Allergies: No Known Allergies    Isolation: None   Infection: None   Code Status: CPR    Ht: 177.8 cm (70\")   Wt: 77.6 kg (171 lb 1.2 oz)    Admission Cmt: None   Principal Problem: Acute respiratory failure with hypoxia (HCC) [J96.01]                 Active Insurance as of 1/4/2023     Primary Coverage     Payor Plan Insurance Group Employer/Plan Group    MEDICARE MEDICARE A & B      Payor Plan Address Payor Plan Phone Number Payor Plan Fax Number Effective Dates    [address] [phone]  10/1/2014 - None Entered    [place] [id number]       Subscriber Name Subscriber Birth Date Member ID       RADHAOLU DANGELO CHLOE 1950 [id number]           Secondary Coverage     Payor Plan Insurance Group Employer/Plan Group    [place] [id number]     Payor Plan Address Payor Plan Phone Number Payor Plan Fax Number Effective Dates    [employer] [address] [address] [phone]  4/1/2020 - None Entered    [facility] [id number]       Subscriber Name Subscriber Birth Date Member ID       RADHAOLU DANGELO CHLOE 1950 [id number]                 Emergency Contacts      (Rel.) Home Phone Work Phone Mobile Phone    DAIANA NICOLE (Brother) [phone] -- " [phone]               Discharge Summary      Janine Sargent, DO at 23 1035              [facility] Medicine Services  DISCHARGE SUMMARY    Patient Name: Olu Henriquez  : 1950  MRN: [id number]    Date of Admission: 2023 10:12 AM  Date of Discharge:  2023  Primary Care Physician: Jesse De Anda MD    Consults     Date and Time Order Name Status Description    2023 11:44 AM Inpatient Cardiac Electrophysiologist Consult Completed     2023  5:46 PM Inpatient Cardiology Consult Completed           Hospital Course     Presenting Problem:   Acute respiratory failure with hypoxia (HCC) [J96.01]    Active Hospital Problems    Diagnosis  POA   • Atrial flutter with controlled response (HCC) [I48.92]  Yes   • Physical deconditioning [R53.81]  Unknown      Resolved Hospital Problems    Diagnosis Date Resolved POA   • **Acute respiratory failure with hypoxia (HCC) [J96.01] 2023 Yes   • CAP (community acquired pneumonia) [J18.9] 2023 Yes   • Sepsis, unspecified organism (HCC) [A41.9] 2023 Yes   • Moderate dehydration [E86.0] 2023 Yes   • Hypokalemia [E87.6] 2023 Yes   • Hypomagnesemia [E83.42] 2023 Yes          Hospital Course:  Olu Henriquez is a 72 y.o. male with PMH significant for HTN, essential tremor and glaucoma. He has slowly lost his sight over the past few months and is now legally blind. He has fallen multiple times at home due to the blindness. Admitted for sepsis / acute hypoxic respiratory failure secondary to LML / BAILEE community acquired pneumonia. Cardiology consulted for atrial flutter and concerns for tachy-gale syndrome. ECHO incidentally revealed a large, anechoic structure adjacent or within the liver measuring 21 x 12.8cm.      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 He is requiring assist x 2 and recommend SNF rehab    Discharge Follow Up Recommendations for outpatient labs/diagnostics:  -PCP 1 week  -Dr Cordova 6 weeks   -Ambulatory referral to GI    Day of Discharge     HPI:   Patient seen and examined. No complaints this AM.     Review of Systems  Gen- No fevers, chills  CV- No chest pain, palpitations  Resp- No cough, dyspnea  GI- No N/V/D, abd pain    Vital Signs:   Temp:  [98.3 °F (36.8 °C)-98.8 °F (37.1 °C)] 98.8 °F (37.1 °C)  Heart Rate:  [55-79] 79  Resp:  [17-18] 17  BP: (139-158)/(81-96) 153/96      Physical Exam:  Constitutional: No acute distress, awake, alert  HENT: NCAT, mucous membranes moist  Respiratory: Clear to auscultation bilaterally, respiratory effort normal   Cardiovascular: RRR, no murmurs, rubs, or gallops  Gastrointestinal: Positive bowel sounds, soft, nontender, nondistended  Musculoskeletal: No bilateral ankle edema  Psychiatric: Appropriate affect, cooperative  Neurologic: Oriented x 3, speech clear  Skin: No rashes    Pertinent  and/or Most Recent Results     LAB RESULTS:      Lab 01/11/23  0834 01/09/23  1818 01/08/23  0637 01/06/23  0615   WBC 8.67 9.06 7.16 10.04   HEMOGLOBIN 15.7 13.6 9.6* 12.6*   HEMATOCRIT 46.6 41.6 29.4* 38.0   PLATELETS 314 255 385 168   MCV 93.0 93.1 105.0* 92.9         Lab 01/11/23  0834 01/09/23  0658 01/08/23  2046 01/08/23  0907 01/08/23  0637 01/07/23  0951 01/06/23  0615   SODIUM 139 134*  --   --  132* 136 137   POTASSIUM 4.7 4.3 4.1 3.6 4.9 3.4* 3.5   CHLORIDE 102 100   --   --  101 98 101   CO2 26.0 25.0  --   --  23.0 27.0 26.0   ANION GAP 11.0 9.0  --   --  8.0 11.0 10.0   BUN 16 16  --   --  5* 13 19   CREATININE 0.92 0.72*  --   --  0.76 0.79 0.76   EGFR 87.8 96.5  --   --  94.9 93.8 94.9   GLUCOSE 104* 88  --   --  85 147* 90   CALCIUM 8.8 8.3*  --   --  7.8* 8.2* 8.5*   MAGNESIUM  --  2.1  --  1.5*  --  1.5*  --    PHOSPHORUS  --   --   --   --   --   --  3.4         Lab 01/09/23  0658 01/08/23  0637 01/06/23  0615   TOTAL PROTEIN 5.9* 5.9* 5.5*   ALBUMIN 2.8* 2.2* 3.0*   GLOBULIN 3.1 3.7 2.5   ALT (SGPT) 48* 34 28   AST (SGOT) 62* 74* 52*   BILIRUBIN 0.4 0.9 0.5   ALK PHOS 65 199* 66   LIPASE 46  --   --                  Lab 01/08/23  0637   FOLATE >20.00   VITAMIN B 12 1,624*         Brief Urine Lab Results  (Last result in the past 365 days)      Color   Clarity   Blood   Leuk Est   Nitrite   Protein   CREAT   Urine HCG        01/04/23 1200 Dark Yellow   Clear   Negative   Negative   Negative   30 mg/dL (1+)               Microbiology Results (last 10 days)     Procedure Component Value - Date/Time    Blood Culture - Blood, Hand, Left [[id number]]  (Normal) Collected: 01/10/23 1045    Lab Status: Preliminary result Specimen: Blood from Hand, Left Updated: 01/11/23 1130     Blood Culture No growth at 24 hours    Narrative:      Aerobic bottle only    Blood Culture - Blood, Arm, Left [[id number]]  (Normal) Collected: 01/10/23 1035    Lab Status: Preliminary result Specimen: Blood from Arm, Left Updated: 01/11/23 1130     Blood Culture No growth at 24 hours    Narrative:      Aerobic bottle only    MRSA Screen, PCR (Inpatient) - Swab, Nares [[id number]]  (Normal) Collected: 01/10/23 1024    Lab Status: Final result Specimen: Swab from Nares Updated: 01/10/23 1202     MRSA PCR Negative    Narrative:      The negative predictive value of this diagnostic test is high and should only be used to consider de-escalating anti-MRSA therapy. A positive result may indicate colonization  with MRSA and must be correlated clinically.  MRSA Negative    S. Pneumo Ag Urine or CSF - Urine, Urine, Clean Catch [[id number]]  (Normal) Collected: 01/04/23 2333    Lab Status: Final result Specimen: Urine, Clean Catch Updated: 01/05/23 1048     Strep Pneumo Ag Negative    Legionella Antigen, Urine - Urine, Urine, Clean Catch [[id number]]  (Normal) Collected: 01/04/23 2333    Lab Status: Final result Specimen: Urine, Clean Catch Updated: 01/05/23 1048     LEGIONELLA ANTIGEN, URINE Negative    MRSA Screen, PCR (Inpatient) - Swab, Nares [[id number]]  (Normal) Collected: 01/04/23 2125    Lab Status: Final result Specimen: Swab from Nares Updated: 01/05/23 0955     MRSA PCR Negative    Narrative:      The negative predictive value of this diagnostic test is high and should only be used to consider de-escalating anti-MRSA therapy. A positive result may indicate colonization with MRSA and must be correlated clinically.  MRSA Negative    Blood Culture - Blood, Hand, Left [[id number]]  (Normal) Collected: 01/04/23 1120    Lab Status: Final result Specimen: Blood from Hand, Left Updated: 01/09/23 1145     Blood Culture No growth at 5 days    COVID PRE-OP / PRE-PROCEDURE SCREENING ORDER (NO ISOLATION) - Swab, Nasopharynx [[id number]]  (Normal) Collected: 01/04/23 1118    Lab Status: Final result Specimen: Swab from Nasopharynx Updated: 01/04/23 1212    Narrative:      The following orders were created for panel order COVID PRE-OP / PRE-PROCEDURE SCREENING ORDER (NO ISOLATION) - Swab, Nasopharynx.  Procedure                               Abnormality         Status                     ---------                               -----------         ------                     COVID-19, FLU A/B, RSV P...[[id number]]  Normal              Final result                 Please view results for these tests on the individual orders.    COVID-19, FLU A/B, RSV PCR - Swab, Nasopharynx [[id number]]  (Normal) Collected: 01/04/23 1118    Lab Status:  Final result Specimen: Swab from Nasopharynx Updated: 01/04/23 1212     COVID19 Not Detected     Influenza A PCR Not Detected     Influenza B PCR Not Detected     RSV, PCR Not Detected    Narrative:      Fact sheet for providers: https://www.fda.gov/media/146299/download    Fact sheet for patients: https://www.fda.gov/media/145084/download    Test performed by PCR.    Blood Culture - Blood, Arm, Right [[id number]]  (Normal) Collected: 01/04/23 1115    Lab Status: Final result Specimen: Blood from Arm, Right Updated: 01/09/23 1145     Blood Culture No growth at 5 days          Adult Transthoracic Echo Complete W/ Cont if Necessary Per Protocol    Result Date: 1/5/2023  •  Left ventricular systolic function is normal. Left ventricular ejection fraction appears to be 56 - 60%. •  Left ventricular wall thickness is consistent with mild to moderate concentric hypertrophy. •  Mildly reduced right ventricular systolic function noted. •  Left atrial volume is moderately increased. •  The right atrial cavity is mildly dilated. •  Mild to moderate aortic valve regurgitation is present. •  Moderate mitral valve regurgitation is present. •  Mild tricuspid valve regurgitation is present. •  Estimated right ventricular systolic pressure from tricuspid regurgitation is mildly elevated (35-45 mmHg). •  Incidental finding of a large anechoic structure adjacent or within the liver, measuring 21.0 x 12.8 cm.     CT Head Without Contrast    Result Date: 1/4/2023  CT HEAD WO CONTRAST Date of Exam: 1/4/2023 2:47 PM EST Indication: ams. Comparison: None available. Technique: Axial CT images were obtained of the head without contrast administration.  Reconstructed coronal and sagittal images were also obtained. Automated exposure control and iterative construction methods were used. FINDINGS: There is no evidence for acute intracranial hemorrhage. No definitive acute focal ischemia is identified. Nonspecific diffuse white matter changes are  noted likely related to chronic small vessel ischemic changes and age-related changes. Associated diffuse volume loss is observed. A centralized pattern of volume loss is noted. There is therefore a prominent appearance of the ventricles felt to be related to volume loss. Communicating hydrocephalus is felt less likely. There is no evidence for abnormal cerebral edema. There is no mass effect or midline shift. The basal cisterns are patent. The skull is intact. The paranasal sinuses and mastoid air cells are clear.     1.No evidence for acute intracranial abnormality. 2.Diffuse nonspecific white matter changes are noted with associated diffuse volume loss. These findings are likely related to chronic small vessel ischemic changes and/or age-related changes. 3.There appears to be a centralized pattern of volume loss which results in a prominent appearance of the ventricular system. Changes secondary to communicating hydrocephalus are felt less likely. Electronically Signed: Umer Kc  1/4/2023 3:07 PM EST  Workstation ID: [id number]    CT Chest Without Contrast Diagnostic    Result Date: 1/9/2023  CT CHEST WO CONTRAST DIAGNOSTIC Date of Exam: 1/9/2023 4:28 PM EST Indication: Pneumonia, complication suspected, xray done. Comparison: 1/4/2023 Technique: Axial CT images were obtained of the chest without contrast administration.  Reconstructed coronal and sagittal images were also obtained. Automated exposure control and iterative construction methods were used. Findings: Multiple partially calcified mediastinal lymph nodes compatible changes of prior granulomatous disease. No mediastinal, hilar, or axillary adenopathy. There is large amount of coronary artery calcification. There is is mild cardiomegaly. There is a new small left pleural effusion. No right pleural effusion. There is persistent airspace disease throughout the left upper lobe. There has been worsening in atelectasis of the left upper lobe. There is  also been worsening in airspace consolidation within the  left lower lobe which may be due to worsening pneumonia or atelectasis. There is minimal linear atelectasis within the right lower lobe. Bilateral adrenal glands are within normal limits. Nonobstructing stone is seen within the upper pole the left kidney. Degenerative changes are noted throughout the spine. No lytic or sclerotic bony lesions are identified.     Impression: 1. Worsening left-sided airspace disease compatible with worsening pneumonia and atelectasis. 2. New small left pleural effusion. Electronically Signed: Ulysses Kim  1/9/2023 8:44 PM EST  Workstation ID: [id number]    US Liver    Result Date: 1/6/2023  US LIVER Date of Exam: 1/6/2023 9:55 AM EST Indication: large anechoic structure adjacent or within the liver, measuring 21.0 x 12.8 cm.. Comparison: Chest CT scan 1/4/2023. No previous abdominal imaging Technique: Grayscale and color Doppler ultrasound evaluation of the right upper quadrant was performed. Findings: By history, the large anechoic structure noted within or adjacent to the liver was seen on echocardiogram. On today's ultrasound images, the liver has a slightly nodular contour and coarsened echotexture, potentially representing cirrhosis. No liver masses are identified and no liver cysts are seen. No cyst is seen immediately adjacent to liver. Pancreas is seen in limited fashion, but where visible appears grossly normal. Gallbladder is borderline thick walled, a 3 mm in width but this may be due to incomplete distention or possibly due to liver disease. There is trace calcification of the gallbladder wall. No gallstones or gallbladder wall debris are identified. Common duct is abnormally dilated, even allowing for the patient's age at approximately 9 mm. There is expected portal vein and hepatic vein waveform and directional flow. The right kidney appears normal and measures 11.5 cm in length. There is a trace amount of ascites  adjacent to the kidney but practically no ascites elsewhere. Findings of a large cystic lesion adjacent to the liver on echocardiogram may represent patient's markedly distended bladder, today noted to be approximately 18 x 10 x 15 cm in maximal diameter. There is no obvious bladder mass or debris level. .     Impression: 1. Coarsened liver echotexture and mildly nodular liver capsule, questionable for mild changes of cirrhosis. Otherwise unremarkable appearance of liver, with no evidence of liver cyst or mass. 2. Mildly dilated common bile duct, at approximately 9 mm. 3. Markedly distended bladder to approximately 18 cm. Electronically Signed: Albert Nagel  1/6/2023 3:11 PM EST  Workstation ID: [id number]    XR Chest 1 View    Result Date: 1/11/2023  XR CHEST 1 VW Date of Exam: 1/11/2023 4:44 AM EST Indication: DYSPNEA, ON EXERTION dyspnea. Comparison: 1/8/2023 at 0445 hours Findings: There is improved aeration throughout the left lung. Residual multifocal infiltrates are noted. There may be a small left pleural effusion. The right lung is clear. The cardiac silhouette and mediastinum are stable.     Impression: Improved aeration throughout the left lung. Residual infiltrates are noted. There is a small left pleural effusion. Electronically Signed: Umer Kc  1/11/2023 10:40 AM EST  Workstation ID: [id number]    XR Chest 1 View    Result Date: 1/8/2023  XR CHEST 1 VW Date of Exam: 1/8/2023 5:00 AM EST Indication: DYSPNEA, ON EXERTION dyspnea. Comparison: 1/4/2023 Findings: Cardiac size is unchanged. There is increasing left-sided pleural fluid. There is increasing volume loss in the left lung. There continues to be infiltrate in the left base. The right lung is clear. There are multiple old healed right-sided rib fractures.     Impression: 1. Increasing left-sided pleural fluid with increasing volume loss in the left lung. Persistent left-sided pulmonary infiltrate. Electronically Signed: Jamison Ford  1/8/2023 9:55  AM EST  Workstation ID: [id number]    XR Chest 1 View    Result Date: 1/4/2023  XR CHEST 1 VW Date of Exam: 1/4/2023 10:46 AM EST Indication: Weakness, dizziness, altered mental status. Comparison: 1/26/2015. Findings: There is elevation of the left hemidiaphragm. There is abnormal consolidation in the left lung, particularly in the left lung base, felt to represent pneumonia. There is a questionable left basilar pleural effusion. The right lung and pleural space are clear. The heart is borderline enlarged. The pulmonary vascular markings are normal. There is a mild dextroscoliosis of the thoracolumbar spine. There are underlying degenerative changes.     Impression: 1. Elevation of the left hemidiaphragm. 2. Abnormal consolidation in the left lung consistent with pneumonia. 3. Questionable small left basilar pleural effusion. Electronically Signed: Reza Smith  1/4/2023 10:59 AM EST  Workstation ID: [id number]    CT Angiogram Chest    Result Date: 1/4/2023  CT ANGIOGRAM CHEST Date of Exam: 1/4/2023 1:00 PM EST Indication: pe. Comparison: None available. Technique: CTA of the chest was performed after the uneventful intravenous administration of 75 mL Isovue-370. Reconstructed coronal and sagittal images were also obtained. In addition, a 3-D volume rendered image was created for interpretation. Automated exposure control and iterative reconstruction methods were used. Findings: There is no pathologic axillary adenopathy or other worrisome body wall soft tissue finding in the chest. No acute findings are present in the partially characterized upper abdomen. There is no pleural or pericardial effusion. Mildly prominent, favored reactive mediastinal lymph nodes are present, for example an AP window lymph node measuring 11 mm short axis. Mildly atherosclerotic, nonaneurysmal thoracic aorta. The pulmonary arteries are well-opacified and without evidence of focal filling defect concerning for acute pulmonary embolus.  Evaluation of the osseous structures demonstrates no evidence of acute fracture or aggressive osseous lesion, with multilevel thoracic spondylosis change present. Evaluation of the lung fields demonstrates areas of  peribronchial consolidation and groundglass opacity throughout the left upper and left lower lobes with most confluent consolidation present in the left lower lobe     Impression: No evidence of pulmonary embolus. Findings of left upper and left lower lobe pneumonia as above. Likely reactive borderline enlarged mediastinal lymph nodes are present. Electronically Signed: Zaki Minor  1/4/2023 1:32 PM EST  Workstation ID: [id number]              Results for orders placed during the hospital encounter of 01/04/23    Adult Transthoracic Echo Complete W/ Cont if Necessary Per Protocol    Interpretation Summary  •  Left ventricular systolic function is normal. Left ventricular ejection fraction appears to be 56 - 60%.  •  Left ventricular wall thickness is consistent with mild to moderate concentric hypertrophy.  •  Mildly reduced right ventricular systolic function noted.  •  Left atrial volume is moderately increased.  •  The right atrial cavity is mildly dilated.  •  Mild to moderate aortic valve regurgitation is present.  •  Moderate mitral valve regurgitation is present.  •  Mild tricuspid valve regurgitation is present.  •  Estimated right ventricular systolic pressure from tricuspid regurgitation is mildly elevated (35-45 mmHg).  •  Incidental finding of a large anechoic structure adjacent or within the liver, measuring 21.0 x 12.8 cm.      Plan for Follow-up of Pending Labs/Results: Inbox   Pending Labs     Order Current Status    Blood Culture - Blood, Arm, Left Preliminary result    Blood Culture - Blood, Hand, Left Preliminary result        Discharge Details        Discharge Medications      New Medications      Instructions Start Date   amoxicillin-clavulanate 875-125 MG per tablet  Commonly known  as: AUGMENTIN   1 tablet, Oral, Every 12 Hours Scheduled      doxylamine 25 MG tablet  Commonly known as: UNISOM   25 mg, Oral, Nightly PRN      hydrALAZINE 25 MG tablet  Commonly known as: APRESOLINE   25 mg, Oral, Every 8 Hours Scheduled      lidocaine 5 %  Commonly known as: LIDODERM   1 patch, Transdermal, Every 24 Hours Scheduled, Remove & Discard patch within 12 hours or as directed by MD      melatonin 5 MG tablet tablet   5 mg, Oral, Nightly PRN      metoprolol tartrate 50 MG tablet  Commonly known as: LOPRESSOR   50 mg, Oral, Every 12 Hours Scheduled      miconazole 2 % powder  Commonly known as: MICOTIN   Topical, Every 12 Hours Scheduled         Continue These Medications      Instructions Start Date   aspirin 81 MG EC tablet   81 mg, Oral, Daily         Stop These Medications    ibuprofen 800 MG tablet  Commonly known as: ADVIL,MOTRIN     propranolol 40 MG tablet  Commonly known as: INDERAL            No Known Allergies      Discharge Disposition:  Skilled Nursing Facility (DC - External)    Diet:  Hospital:  Diet Order   Procedures   • Diet: Regular/House Diet; Texture: Regular Texture (IDDSI 7); Fluid Consistency: Thin (IDDSI 0)       Activity:      Restrictions or Other Recommendations:       CODE STATUS:    Code Status and Medical Interventions:   Ordered at: 01/04/23 1452     Level Of Support Discussed With:    Patient     Code Status (Patient has no pulse and is not breathing):    CPR (Attempt to Resuscitate)     Medical Interventions (Patient has pulse or is breathing):    Full Support       Future Appointments   Date Time Provider Department Center   2/23/2023  1:30 PM Rainer Potts PA [facility]       Additional Instructions for the Follow-ups that You Need to Schedule     Discharge Follow-up with PCP   As directed       Currently Documented PCP:    Jesse De Anda MD    PCP Phone Number:    [phone]     Follow Up Details: 1 week         Discharge Follow-up with Specialty:   Tasha; 6 Weeks   As directed      Specialty: Dr. Cordova    Follow Up: 6 Weeks    Follow Up Details: follow up aflutter, d/c with monitor         Discharge Follow-up with Specified Provider: Dr Cordova; 6 Weeks   As directed      To: Dr Cordova    Follow Up: 6 Weeks    Follow Up Details: Follow-up on MCOT results                     Janine Sargent DO  01/12/23      Time Spent on Discharge:  I spent  45  minutes on this discharge activity which included: face-to-face encounter with the patient, reviewing the data in the system, coordination of the care with the nursing staff as well as consultants, documentation, and entering orders.            Electronically signed by Janine Sargent DO at 01/12/23 1045

## 2023-01-12 NOTE — TELEPHONE ENCOUNTER
Lvm for pt to call back to make an apt. Left encounter for hub/front to make pt an apt here in office

## 2023-01-12 NOTE — DISCHARGE SUMMARY
[facility] Medicine Services  DISCHARGE SUMMARY    Patient Name: Olu Henriquez  : 1950  MRN: [id number]    Date of Admission: 2023 10:12 AM  Date of Discharge:  2023  Primary Care Physician: Jesse De Anda MD    Consults     Date and Time Order Name Status Description    2023 11:44 AM Inpatient Cardiac Electrophysiologist Consult Completed     2023  5:46 PM Inpatient Cardiology Consult Completed           Hospital Course     Presenting Problem:   Acute respiratory failure with hypoxia (HCC) [J96.01]    Active Hospital Problems    Diagnosis  POA   • Atrial flutter with controlled response (HCC) [I48.92]  Yes   • Physical deconditioning [R53.81]  Unknown      Resolved Hospital Problems    Diagnosis Date Resolved POA   • **Acute respiratory failure with hypoxia (HCC) [J96.01] 2023 Yes   • CAP (community acquired pneumonia) [J18.9] 2023 Yes   • Sepsis, unspecified organism (HCC) [A41.9] 2023 Yes   • Moderate dehydration [E86.0] 2023 Yes   • Hypokalemia [E87.6] 2023 Yes   • Hypomagnesemia [E83.42] 2023 Yes          Hospital Course:  Olu Henriquez is a 72 y.o. male with PMH significant for HTN, essential tremor and glaucoma. He has slowly lost his sight over the past few months and is now legally blind. He has fallen multiple times at home due to the blindness. Admitted for sepsis / acute hypoxic respiratory failure secondary to LML / BAILEE community acquired pneumonia. Cardiology consulted for atrial flutter and concerns for tachy-gale syndrome. ECHO incidentally revealed a large, anechoic structure adjacent or within the liver measuring 21 x 12.8cm.      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 He is requiring assist x 2 and recommend SNF rehab    Discharge Follow Up Recommendations for outpatient labs/diagnostics:  -PCP 1 week  -Dr Cordova 6 weeks   -Ambulatory referral to GI    Day of Discharge     HPI:   Patient seen and examined. No complaints this AM.     Review of Systems  Gen- No fevers, chills  CV- No chest pain, palpitations  Resp- No cough, dyspnea  GI- No N/V/D, abd pain    Vital Signs:   Temp:  [98.3 °F (36.8 °C)-98.8 °F (37.1 °C)] 98.8 °F (37.1 °C)  Heart Rate:  [55-79] 79  Resp:  [17-18] 17  BP: (139-158)/(81-96) 153/96      Physical Exam:  Constitutional: No acute distress, awake, alert  HENT: NCAT, mucous membranes moist  Respiratory: Clear to auscultation bilaterally, respiratory effort normal   Cardiovascular: RRR, no murmurs, rubs, or gallops  Gastrointestinal: Positive bowel sounds, soft, nontender, nondistended  Musculoskeletal: No bilateral ankle edema  Psychiatric: Appropriate affect, cooperative  Neurologic: Oriented x 3, speech clear  Skin: No rashes    Pertinent  and/or Most Recent Results     LAB RESULTS:      Lab 01/11/23  0834 01/09/23  1818 01/08/23  0637 01/06/23  0615   WBC 8.67 9.06 7.16 10.04   HEMOGLOBIN 15.7 13.6 9.6* 12.6*   HEMATOCRIT 46.6 41.6 29.4* 38.0   PLATELETS 314 255 385 168   MCV 93.0 93.1 105.0* 92.9         Lab 01/11/23  0834 01/09/23  0658 01/08/23  2046 01/08/23  0907 01/08/23  0637 01/07/23  0951 01/06/23  0615   SODIUM 139 134*  --   --  132* 136 137   POTASSIUM 4.7 4.3 4.1 3.6 4.9 3.4* 3.5   CHLORIDE 102 100  --   --  101 98 101   CO2 26.0 25.0  --   --  23.0 27.0 26.0   ANION GAP 11.0 9.0  --   --  8.0 11.0 10.0    BUN 16 16  --   --  5* 13 19   CREATININE 0.92 0.72*  --   --  0.76 0.79 0.76   EGFR 87.8 96.5  --   --  94.9 93.8 94.9   GLUCOSE 104* 88  --   --  85 147* 90   CALCIUM 8.8 8.3*  --   --  7.8* 8.2* 8.5*   MAGNESIUM  --  2.1  --  1.5*  --  1.5*  --    PHOSPHORUS  --   --   --   --   --   --  3.4         Lab 01/09/23  0658 01/08/23  0637 01/06/23  0615   TOTAL PROTEIN 5.9* 5.9* 5.5*   ALBUMIN 2.8* 2.2* 3.0*   GLOBULIN 3.1 3.7 2.5   ALT (SGPT) 48* 34 28   AST (SGOT) 62* 74* 52*   BILIRUBIN 0.4 0.9 0.5   ALK PHOS 65 199* 66   LIPASE 46  --   --                  Lab 01/08/23  0637   FOLATE >20.00   VITAMIN B 12 1,624*         Brief Urine Lab Results  (Last result in the past 365 days)      Color   Clarity   Blood   Leuk Est   Nitrite   Protein   CREAT   Urine HCG        01/04/23 1200 Dark Yellow   Clear   Negative   Negative   Negative   30 mg/dL (1+)               Microbiology Results (last 10 days)     Procedure Component Value - Date/Time    Blood Culture - Blood, Hand, Left [[id number]]  (Normal) Collected: 01/10/23 1045    Lab Status: Preliminary result Specimen: Blood from Hand, Left Updated: 01/11/23 1130     Blood Culture No growth at 24 hours    Narrative:      Aerobic bottle only    Blood Culture - Blood, Arm, Left [[id number]]  (Normal) Collected: 01/10/23 1035    Lab Status: Preliminary result Specimen: Blood from Arm, Left Updated: 01/11/23 1130     Blood Culture No growth at 24 hours    Narrative:      Aerobic bottle only    MRSA Screen, PCR (Inpatient) - Swab, Nares [[id number]]  (Normal) Collected: 01/10/23 1024    Lab Status: Final result Specimen: Swab from Nares Updated: 01/10/23 1202     MRSA PCR Negative    Narrative:      The negative predictive value of this diagnostic test is high and should only be used to consider de-escalating anti-MRSA therapy. A positive result may indicate colonization with MRSA and must be correlated clinically.  MRSA Negative    S. Pneumo Ag Urine or CSF - Urine, Urine,  Clean Catch [[id number]]  (Normal) Collected: 01/04/23 2333    Lab Status: Final result Specimen: Urine, Clean Catch Updated: 01/05/23 1048     Strep Pneumo Ag Negative    Legionella Antigen, Urine - Urine, Urine, Clean Catch [[id number]]  (Normal) Collected: 01/04/23 2333    Lab Status: Final result Specimen: Urine, Clean Catch Updated: 01/05/23 1048     LEGIONELLA ANTIGEN, URINE Negative    MRSA Screen, PCR (Inpatient) - Swab, Nares [[id number]]  (Normal) Collected: 01/04/23 2125    Lab Status: Final result Specimen: Swab from Nares Updated: 01/05/23 0955     MRSA PCR Negative    Narrative:      The negative predictive value of this diagnostic test is high and should only be used to consider de-escalating anti-MRSA therapy. A positive result may indicate colonization with MRSA and must be correlated clinically.  MRSA Negative    Blood Culture - Blood, Hand, Left [[id number]]  (Normal) Collected: 01/04/23 1120    Lab Status: Final result Specimen: Blood from Hand, Left Updated: 01/09/23 1145     Blood Culture No growth at 5 days    COVID PRE-OP / PRE-PROCEDURE SCREENING ORDER (NO ISOLATION) - Swab, Nasopharynx [[id number]]  (Normal) Collected: 01/04/23 1118    Lab Status: Final result Specimen: Swab from Nasopharynx Updated: 01/04/23 1212    Narrative:      The following orders were created for panel order COVID PRE-OP / PRE-PROCEDURE SCREENING ORDER (NO ISOLATION) - Swab, Nasopharynx.  Procedure                               Abnormality         Status                     ---------                               -----------         ------                     COVID-19, FLU A/B, RSV P...[[id number]]  Normal              Final result                 Please view results for these tests on the individual orders.    COVID-19, FLU A/B, RSV PCR - Swab, Nasopharynx [[id number]]  (Normal) Collected: 01/04/23 1118    Lab Status: Final result Specimen: Swab from Nasopharynx Updated: 01/04/23 1212     COVID19 Not Detected     Influenza  A PCR Not Detected     Influenza B PCR Not Detected     RSV, PCR Not Detected    Narrative:      Fact sheet for providers: https://www.fda.gov/media/416340/download    Fact sheet for patients: https://www.fda.gov/media/783013/download    Test performed by PCR.    Blood Culture - Blood, Arm, Right [[id number]]  (Normal) Collected: 01/04/23 1115    Lab Status: Final result Specimen: Blood from Arm, Right Updated: 01/09/23 1145     Blood Culture No growth at 5 days          Adult Transthoracic Echo Complete W/ Cont if Necessary Per Protocol    Result Date: 1/5/2023  •  Left ventricular systolic function is normal. Left ventricular ejection fraction appears to be 56 - 60%. •  Left ventricular wall thickness is consistent with mild to moderate concentric hypertrophy. •  Mildly reduced right ventricular systolic function noted. •  Left atrial volume is moderately increased. •  The right atrial cavity is mildly dilated. •  Mild to moderate aortic valve regurgitation is present. •  Moderate mitral valve regurgitation is present. •  Mild tricuspid valve regurgitation is present. •  Estimated right ventricular systolic pressure from tricuspid regurgitation is mildly elevated (35-45 mmHg). •  Incidental finding of a large anechoic structure adjacent or within the liver, measuring 21.0 x 12.8 cm.     CT Head Without Contrast    Result Date: 1/4/2023  CT HEAD WO CONTRAST Date of Exam: 1/4/2023 2:47 PM EST Indication: ams. Comparison: None available. Technique: Axial CT images were obtained of the head without contrast administration.  Reconstructed coronal and sagittal images were also obtained. Automated exposure control and iterative construction methods were used. FINDINGS: There is no evidence for acute intracranial hemorrhage. No definitive acute focal ischemia is identified. Nonspecific diffuse white matter changes are noted likely related to chronic small vessel ischemic changes and age-related changes. Associated diffuse  volume loss is observed. A centralized pattern of volume loss is noted. There is therefore a prominent appearance of the ventricles felt to be related to volume loss. Communicating hydrocephalus is felt less likely. There is no evidence for abnormal cerebral edema. There is no mass effect or midline shift. The basal cisterns are patent. The skull is intact. The paranasal sinuses and mastoid air cells are clear.     1.No evidence for acute intracranial abnormality. 2.Diffuse nonspecific white matter changes are noted with associated diffuse volume loss. These findings are likely related to chronic small vessel ischemic changes and/or age-related changes. 3.There appears to be a centralized pattern of volume loss which results in a prominent appearance of the ventricular system. Changes secondary to communicating hydrocephalus are felt less likely. Electronically Signed: Umer Kc  1/4/2023 3:07 PM EST  Workstation ID: [id number]    CT Chest Without Contrast Diagnostic    Result Date: 1/9/2023  CT CHEST WO CONTRAST DIAGNOSTIC Date of Exam: 1/9/2023 4:28 PM EST Indication: Pneumonia, complication suspected, xray done. Comparison: 1/4/2023 Technique: Axial CT images were obtained of the chest without contrast administration.  Reconstructed coronal and sagittal images were also obtained. Automated exposure control and iterative construction methods were used. Findings: Multiple partially calcified mediastinal lymph nodes compatible changes of prior granulomatous disease. No mediastinal, hilar, or axillary adenopathy. There is large amount of coronary artery calcification. There is is mild cardiomegaly. There is a new small left pleural effusion. No right pleural effusion. There is persistent airspace disease throughout the left upper lobe. There has been worsening in atelectasis of the left upper lobe. There is also been worsening in airspace consolidation within the  left lower lobe which may be due to worsening  pneumonia or atelectasis. There is minimal linear atelectasis within the right lower lobe. Bilateral adrenal glands are within normal limits. Nonobstructing stone is seen within the upper pole the left kidney. Degenerative changes are noted throughout the spine. No lytic or sclerotic bony lesions are identified.     Impression: 1. Worsening left-sided airspace disease compatible with worsening pneumonia and atelectasis. 2. New small left pleural effusion. Electronically Signed: Ulysses Kim  1/9/2023 8:44 PM EST  Workstation ID: [id number]    US Liver    Result Date: 1/6/2023  US LIVER Date of Exam: 1/6/2023 9:55 AM EST Indication: large anechoic structure adjacent or within the liver, measuring 21.0 x 12.8 cm.. Comparison: Chest CT scan 1/4/2023. No previous abdominal imaging Technique: Grayscale and color Doppler ultrasound evaluation of the right upper quadrant was performed. Findings: By history, the large anechoic structure noted within or adjacent to the liver was seen on echocardiogram. On today's ultrasound images, the liver has a slightly nodular contour and coarsened echotexture, potentially representing cirrhosis. No liver masses are identified and no liver cysts are seen. No cyst is seen immediately adjacent to liver. Pancreas is seen in limited fashion, but where visible appears grossly normal. Gallbladder is borderline thick walled, a 3 mm in width but this may be due to incomplete distention or possibly due to liver disease. There is trace calcification of the gallbladder wall. No gallstones or gallbladder wall debris are identified. Common duct is abnormally dilated, even allowing for the patient's age at approximately 9 mm. There is expected portal vein and hepatic vein waveform and directional flow. The right kidney appears normal and measures 11.5 cm in length. There is a trace amount of ascites adjacent to the kidney but practically no ascites elsewhere. Findings of a large cystic lesion adjacent  to the liver on echocardiogram may represent patient's markedly distended bladder, today noted to be approximately 18 x 10 x 15 cm in maximal diameter. There is no obvious bladder mass or debris level. .     Impression: 1. Coarsened liver echotexture and mildly nodular liver capsule, questionable for mild changes of cirrhosis. Otherwise unremarkable appearance of liver, with no evidence of liver cyst or mass. 2. Mildly dilated common bile duct, at approximately 9 mm. 3. Markedly distended bladder to approximately 18 cm. Electronically Signed: Albert Nagel  1/6/2023 3:11 PM EST  Workstation ID: [id number]    XR Chest 1 View    Result Date: 1/11/2023  XR CHEST 1 VW Date of Exam: 1/11/2023 4:44 AM EST Indication: DYSPNEA, ON EXERTION dyspnea. Comparison: 1/8/2023 at 0445 hours Findings: There is improved aeration throughout the left lung. Residual multifocal infiltrates are noted. There may be a small left pleural effusion. The right lung is clear. The cardiac silhouette and mediastinum are stable.     Impression: Improved aeration throughout the left lung. Residual infiltrates are noted. There is a small left pleural effusion. Electronically Signed: Umer Kc  1/11/2023 10:40 AM EST  Workstation ID: [id number]    XR Chest 1 View    Result Date: 1/8/2023  XR CHEST 1 VW Date of Exam: 1/8/2023 5:00 AM EST Indication: DYSPNEA, ON EXERTION dyspnea. Comparison: 1/4/2023 Findings: Cardiac size is unchanged. There is increasing left-sided pleural fluid. There is increasing volume loss in the left lung. There continues to be infiltrate in the left base. The right lung is clear. There are multiple old healed right-sided rib fractures.     Impression: 1. Increasing left-sided pleural fluid with increasing volume loss in the left lung. Persistent left-sided pulmonary infiltrate. Electronically Signed: Jamison Ford  1/8/2023 9:55 AM EST  Workstation ID: [id number]    XR Chest 1 View    Result Date: 1/4/2023  XR CHEST 1 VW Date of  Exam: 1/4/2023 10:46 AM EST Indication: Weakness, dizziness, altered mental status. Comparison: 1/26/2015. Findings: There is elevation of the left hemidiaphragm. There is abnormal consolidation in the left lung, particularly in the left lung base, felt to represent pneumonia. There is a questionable left basilar pleural effusion. The right lung and pleural space are clear. The heart is borderline enlarged. The pulmonary vascular markings are normal. There is a mild dextroscoliosis of the thoracolumbar spine. There are underlying degenerative changes.     Impression: 1. Elevation of the left hemidiaphragm. 2. Abnormal consolidation in the left lung consistent with pneumonia. 3. Questionable small left basilar pleural effusion. Electronically Signed: Reza Smith  1/4/2023 10:59 AM EST  Workstation ID: [id number]    CT Angiogram Chest    Result Date: 1/4/2023  CT ANGIOGRAM CHEST Date of Exam: 1/4/2023 1:00 PM EST Indication: pe. Comparison: None available. Technique: CTA of the chest was performed after the uneventful intravenous administration of 75 mL Isovue-370. Reconstructed coronal and sagittal images were also obtained. In addition, a 3-D volume rendered image was created for interpretation. Automated exposure control and iterative reconstruction methods were used. Findings: There is no pathologic axillary adenopathy or other worrisome body wall soft tissue finding in the chest. No acute findings are present in the partially characterized upper abdomen. There is no pleural or pericardial effusion. Mildly prominent, favored reactive mediastinal lymph nodes are present, for example an AP window lymph node measuring 11 mm short axis. Mildly atherosclerotic, nonaneurysmal thoracic aorta. The pulmonary arteries are well-opacified and without evidence of focal filling defect concerning for acute pulmonary embolus. Evaluation of the osseous structures demonstrates no evidence of acute fracture or aggressive osseous  lesion, with multilevel thoracic spondylosis change present. Evaluation of the lung fields demonstrates areas of  peribronchial consolidation and groundglass opacity throughout the left upper and left lower lobes with most confluent consolidation present in the left lower lobe     Impression: No evidence of pulmonary embolus. Findings of left upper and left lower lobe pneumonia as above. Likely reactive borderline enlarged mediastinal lymph nodes are present. Electronically Signed: Zaki Minor  1/4/2023 1:32 PM EST  Workstation ID: [id number]              Results for orders placed during the hospital encounter of 01/04/23    Adult Transthoracic Echo Complete W/ Cont if Necessary Per Protocol    Interpretation Summary  •  Left ventricular systolic function is normal. Left ventricular ejection fraction appears to be 56 - 60%.  •  Left ventricular wall thickness is consistent with mild to moderate concentric hypertrophy.  •  Mildly reduced right ventricular systolic function noted.  •  Left atrial volume is moderately increased.  •  The right atrial cavity is mildly dilated.  •  Mild to moderate aortic valve regurgitation is present.  •  Moderate mitral valve regurgitation is present.  •  Mild tricuspid valve regurgitation is present.  •  Estimated right ventricular systolic pressure from tricuspid regurgitation is mildly elevated (35-45 mmHg).  •  Incidental finding of a large anechoic structure adjacent or within the liver, measuring 21.0 x 12.8 cm.      Plan for Follow-up of Pending Labs/Results: Inbox   Pending Labs     Order Current Status    Blood Culture - Blood, Arm, Left Preliminary result    Blood Culture - Blood, Hand, Left Preliminary result        Discharge Details        Discharge Medications      New Medications      Instructions Start Date   amoxicillin-clavulanate 875-125 MG per tablet  Commonly known as: AUGMENTIN   1 tablet, Oral, Every 12 Hours Scheduled      doxylamine 25 MG tablet  Commonly known  as: UNISOM   25 mg, Oral, Nightly PRN      hydrALAZINE 25 MG tablet  Commonly known as: APRESOLINE   25 mg, Oral, Every 8 Hours Scheduled      lidocaine 5 %  Commonly known as: LIDODERM   1 patch, Transdermal, Every 24 Hours Scheduled, Remove & Discard patch within 12 hours or as directed by MD      melatonin 5 MG tablet tablet   5 mg, Oral, Nightly PRN      metoprolol tartrate 50 MG tablet  Commonly known as: LOPRESSOR   50 mg, Oral, Every 12 Hours Scheduled      miconazole 2 % powder  Commonly known as: MICOTIN   Topical, Every 12 Hours Scheduled         Continue These Medications      Instructions Start Date   aspirin 81 MG EC tablet   81 mg, Oral, Daily         Stop These Medications    ibuprofen 800 MG tablet  Commonly known as: ADVIL,MOTRIN     propranolol 40 MG tablet  Commonly known as: INDERAL            No Known Allergies      Discharge Disposition:  Skilled Nursing Facility (DC - External)    Diet:  Hospital:  Diet Order   Procedures   • Diet: Regular/House Diet; Texture: Regular Texture (IDDSI 7); Fluid Consistency: Thin (IDDSI 0)       Activity:      Restrictions or Other Recommendations:       CODE STATUS:    Code Status and Medical Interventions:   Ordered at: 01/04/23 1452     Level Of Support Discussed With:    Patient     Code Status (Patient has no pulse and is not breathing):    CPR (Attempt to Resuscitate)     Medical Interventions (Patient has pulse or is breathing):    Full Support       Future Appointments   Date Time Provider Department Center   2/23/2023  1:30 PM Rainer Potts PA [facility]       Additional Instructions for the Follow-ups that You Need to Schedule     Discharge Follow-up with PCP   As directed       Currently Documented PCP:    Jesse De Anda MD    PCP Phone Number:    [phone]     Follow Up Details: 1 week         Discharge Follow-up with Specialty: Dr. Cordova; 6 Weeks   As directed      Specialty: Dr. Cordova    Follow Up: 6 Weeks    Follow Up  Details: follow up dany, d/c with monitor         Discharge Follow-up with Specified Provider: Dr Cordova; 6 Weeks   As directed      To: Dr Cordova    Follow Up: 6 Weeks    Follow Up Details: Follow-up on MCOT results                     Janine Sargent DO  01/12/23      Time Spent on Discharge:  I spent  45  minutes on this discharge activity which included: face-to-face encounter with the patient, reviewing the data in the system, coordination of the care with the nursing staff as well as consultants, documentation, and entering orders.

## 2023-01-12 NOTE — PLAN OF CARE
Goal Outcome Evaluation:   VSS on RA. No acute events overnight. Plan is for discharge to [facility] today at noon.         Progress: improving

## 2023-01-15 LAB
BACTERIA SPEC AEROBE CULT: NORMAL
BACTERIA SPEC AEROBE CULT: NORMAL

## 2023-01-16 ENCOUNTER — TELEPHONE (OUTPATIENT)
Dept: FAMILY MEDICINE CLINIC | Facility: CLINIC | Age: 73
End: 2023-01-16
Payer: MEDICARE

## 2023-02-23 ENCOUNTER — OFFICE VISIT (OUTPATIENT)
Dept: CARDIOLOGY | Facility: CLINIC | Age: 73
End: 2023-02-23
Payer: MEDICARE

## 2023-02-23 VITALS
OXYGEN SATURATION: 97 % | BODY MASS INDEX: 21.62 KG/M2 | HEART RATE: 78 BPM | SYSTOLIC BLOOD PRESSURE: 136 MMHG | DIASTOLIC BLOOD PRESSURE: 72 MMHG | WEIGHT: 151 LBS | HEIGHT: 70 IN

## 2023-02-23 DIAGNOSIS — I49.5 TACHY-BRADY SYNDROME: ICD-10-CM

## 2023-02-23 DIAGNOSIS — I10 PRIMARY HYPERTENSION: ICD-10-CM

## 2023-02-23 DIAGNOSIS — I48.92 ATRIAL FLUTTER WITH CONTROLLED RESPONSE: Primary | ICD-10-CM

## 2023-02-23 PROCEDURE — 99214 OFFICE O/P EST MOD 30 MIN: CPT | Performed by: PHYSICIAN ASSISTANT

## 2023-02-23 NOTE — PROGRESS NOTES
Olu Henriquez  1950      [facility]     Provider, No Known  [facility]  [facility] [id number]    Chief Complaint   Patient presents with   • Atrial Flutter     HFU       Problem List:   1. Paroxysmal atrial flutter/tachybradycardia syndrome  a. CHADsVasc 1, not on anticoagulation due to high fall risk  b. Echocardiogram 1/3/2023: LVEF 56-60%, LV wall thickness consistent with mild to moderate concentric hypertrophy, LA volume moderately increased, RA cavity mildly dilated, mild to moderate AR, moderate MR, mild TR, RVSP 35-45 mmHg, large anechoic structure adjacent or within the liver measuring 21 x 12.8 cm  2. BAILEE/LML community-acquired pneumonia/acute respiratory failure with hypoxia  3. Anechoic liver lesion  4. RBBB  5. Anemia  6. Glaucoma  7. Frequent falls  8. Tremor  9. Alcohol use; 2 cans of beer, 4 shots of liquor per week  10. Bladder and bowel incontinence  11. Debility  12. Former tobacco abuse; 1 pack/day x 58 years, quit 12/25/2022  13. Surgical history:  a. Chest surgery in 2009 after having pneumonia and multiple chest tubes-data deficit  b. Eye surgery     Allergies  No Known Allergies    Current Medications    Current Outpatient Medications:   •  aspirin 81 MG EC tablet, Take 81 mg by mouth Daily., Disp: , Rfl:   •  hydrALAZINE (APRESOLINE) 25 MG tablet, Take 1 tablet by mouth Every 8 (Eight) Hours., Disp: 90 tablet, Rfl: 0  •  lidocaine (LIDODERM) 5 %, Place 1 patch on the skin as directed by provider Daily. Remove & Discard patch within 12 hours or as directed by MD, Disp: 30 patch, Rfl: 0  •  metoprolol tartrate (LOPRESSOR) 50 MG tablet, Take 1 tablet by mouth Every 12 (Twelve) Hours., Disp: 60 tablet, Rfl: 0    History of Present Illness   HPI    Pt presents for follow up of  AFib/flutter . The patient was recently admitted to [facility] for PNA, Sepsis, and anemia. He had Aflutter during the hospital stay. He was not felt to be a good candidate  "for Ablation. He has a elevated Chadsvasc-3 but considered not candidate for anticoagulation due to frequent falls, anemia, and legal blindness. He states since he was in the hospital he has not noticed any palpitations, dizziness, near syncope events. He denies any chest pain, or sob. He is currently living in [facility].       Vitals:    02/23/23 1344   BP: 136/72   BP Location: Left arm   Patient Position: Sitting   Pulse: 78   SpO2: 97%   Weight: 68.5 kg (151 lb)   Height: 177.8 cm (70\")     Body mass index is 21.67 kg/m².  PE:  General: NAD  Neck: no JVD, no carotid bruits, no TM  Heart RRR, NL S1, S2, S4 present, no rubs, murmurs  Lungs: CTA, no wheezes, rhonchi, or rales  Abd: soft, non-tender, NL BS  Ext: No musculoskeletal deformities, no edema, cyanosis, or clubbing  Psych: normal mood and affect        Procedures    1. Atrial flutter with controlled response (HCC)    2. Primary hypertension    3. Tachy-gale syndrome (HCC)          Plan:  1) Aflutter/Tachybrady syndrome:   Rate control strategy, Not good EPS, RFA candidate due to Left atrial enlargement.     2) Anticoagulation: Chadvasc=3, High risk for anticoagulation due to frequent falls, severe anemia, and blindness. He is on asa daily.       3) RBBB, Left anterior fascicular /Tachybrady syndrome: Stable currently on low dose BB, Monitor closely. .     4)Admission to [facility] with Severe Anemia, CAP, Sepsis, and Liver cirrhosis:Discharged to Rehab 1/12/2023       Electronically signed by AIDEN Connell, 02/23/23, 2:14 PM EST.            "